# Patient Record
Sex: MALE | Race: WHITE | URBAN - METROPOLITAN AREA
[De-identification: names, ages, dates, MRNs, and addresses within clinical notes are randomized per-mention and may not be internally consistent; named-entity substitution may affect disease eponyms.]

---

## 2017-08-30 ENCOUNTER — INPATIENT (INPATIENT)
Facility: HOSPITAL | Age: 57
LOS: 5 days | Discharge: HOME | DRG: 853 | End: 2017-09-05
Attending: HOSPITALIST | Admitting: INTERNAL MEDICINE
Payer: COMMERCIAL

## 2017-08-30 VITALS
WEIGHT: 179.9 LBS | RESPIRATION RATE: 4 BRPM | SYSTOLIC BLOOD PRESSURE: 94 MMHG | DIASTOLIC BLOOD PRESSURE: 50 MMHG | HEIGHT: 67 IN | HEART RATE: 112 BPM

## 2017-08-30 DIAGNOSIS — D64.9 ANEMIA, UNSPECIFIED: ICD-10-CM

## 2017-08-30 DIAGNOSIS — R57.0 CARDIOGENIC SHOCK: ICD-10-CM

## 2017-08-30 DIAGNOSIS — R41.82 ALTERED MENTAL STATUS, UNSPECIFIED: ICD-10-CM

## 2017-08-30 DIAGNOSIS — J96.01 ACUTE RESPIRATORY FAILURE WITH HYPOXIA: ICD-10-CM

## 2017-08-30 DIAGNOSIS — E87.2 ACIDOSIS: ICD-10-CM

## 2017-08-30 DIAGNOSIS — A41.9 SEPSIS, UNSPECIFIED ORGANISM: ICD-10-CM

## 2017-08-30 DIAGNOSIS — E87.6 HYPOKALEMIA: ICD-10-CM

## 2017-08-30 DIAGNOSIS — E83.51 HYPOCALCEMIA: ICD-10-CM

## 2017-08-30 DIAGNOSIS — N17.9 ACUTE KIDNEY FAILURE, UNSPECIFIED: ICD-10-CM

## 2017-08-30 DIAGNOSIS — G93.40 ENCEPHALOPATHY, UNSPECIFIED: ICD-10-CM

## 2017-08-30 LAB
ALBUMIN SERPL ELPH-MCNC: 1.9 G/DL — LOW (ref 3.3–5.2)
ALBUMIN SERPL ELPH-MCNC: 3.1 G/DL — LOW (ref 3.3–5.2)
ALP SERPL-CCNC: 33 U/L — LOW (ref 40–120)
ALP SERPL-CCNC: 68 U/L — SIGNIFICANT CHANGE UP (ref 40–120)
ALT FLD-CCNC: 32 U/L — SIGNIFICANT CHANGE UP
ALT FLD-CCNC: 754 U/L — HIGH
AMPHET UR-MCNC: NEGATIVE — SIGNIFICANT CHANGE UP
ANION GAP SERPL CALC-SCNC: 16 MMOL/L — SIGNIFICANT CHANGE UP (ref 5–17)
ANION GAP SERPL CALC-SCNC: 18 MMOL/L — HIGH (ref 5–17)
ANION GAP SERPL CALC-SCNC: 19 MMOL/L — HIGH (ref 5–17)
APAP SERPL-MCNC: <7.5 UG/ML — LOW (ref 10–26)
APPEARANCE UR: CLEAR — SIGNIFICANT CHANGE UP
APTT BLD: 52.6 SEC — HIGH (ref 27.5–37.4)
AST SERPL-CCNC: 43 U/L — HIGH
AST SERPL-CCNC: 495 U/L — HIGH
BARBITURATES UR SCN-MCNC: NEGATIVE — SIGNIFICANT CHANGE UP
BASE EXCESS BLDA CALC-SCNC: -10.1 MMOL/L — LOW (ref -3–3)
BENZODIAZ UR-MCNC: NEGATIVE — SIGNIFICANT CHANGE UP
BILIRUB SERPL-MCNC: 0.1 MG/DL — LOW (ref 0.4–2)
BILIRUB SERPL-MCNC: 0.7 MG/DL — SIGNIFICANT CHANGE UP (ref 0.4–2)
BILIRUB UR-MCNC: NEGATIVE — SIGNIFICANT CHANGE UP
BLD GP AB SCN SERPL QL: SIGNIFICANT CHANGE UP
BLOOD GAS COMMENTS ARTERIAL: SIGNIFICANT CHANGE UP
BUN SERPL-MCNC: 16 MG/DL — SIGNIFICANT CHANGE UP (ref 8–20)
BUN SERPL-MCNC: 30 MG/DL — HIGH (ref 8–20)
BUN SERPL-MCNC: 33 MG/DL — HIGH (ref 8–20)
CALCIUM SERPL-MCNC: 3.8 MG/DL — CRITICAL LOW (ref 8.6–10.2)
CALCIUM SERPL-MCNC: 7.8 MG/DL — LOW (ref 8.6–10.2)
CALCIUM SERPL-MCNC: 8.2 MG/DL — LOW (ref 8.6–10.2)
CHLORIDE SERPL-SCNC: 105 MMOL/L — SIGNIFICANT CHANGE UP (ref 98–107)
CHLORIDE SERPL-SCNC: 106 MMOL/L — SIGNIFICANT CHANGE UP (ref 98–107)
CHLORIDE SERPL-SCNC: 113 MMOL/L — HIGH (ref 98–107)
CK MB CFR SERPL CALC: 101 NG/ML — HIGH (ref 0–6.7)
CK MB CFR SERPL CALC: 112.6 NG/ML — HIGH (ref 0–6.7)
CK MB CFR SERPL CALC: 46.3 NG/ML — HIGH (ref 0–6.7)
CK SERPL-CCNC: 2462 U/L — HIGH (ref 30–200)
CK SERPL-CCNC: 2569 U/L — HIGH (ref 30–200)
CK SERPL-CCNC: 765 U/L — HIGH (ref 30–200)
CO2 SERPL-SCNC: 13 MMOL/L — LOW (ref 22–29)
CO2 SERPL-SCNC: 18 MMOL/L — LOW (ref 22–29)
CO2 SERPL-SCNC: 20 MMOL/L — LOW (ref 22–29)
COCAINE METAB.OTHER UR-MCNC: NEGATIVE — SIGNIFICANT CHANGE UP
COLOR SPEC: YELLOW — SIGNIFICANT CHANGE UP
CREAT SERPL-MCNC: 0.9 MG/DL — SIGNIFICANT CHANGE UP (ref 0.5–1.3)
CREAT SERPL-MCNC: 1.97 MG/DL — HIGH (ref 0.5–1.3)
CREAT SERPL-MCNC: 2.02 MG/DL — HIGH (ref 0.5–1.3)
DIFF PNL FLD: ABNORMAL
EOSINOPHIL # BLD AUTO: 0 K/UL — SIGNIFICANT CHANGE UP (ref 0–0.5)
EOSINOPHIL NFR BLD AUTO: 0.1 % — SIGNIFICANT CHANGE UP (ref 0–5)
GAS PNL BLDA: SIGNIFICANT CHANGE UP
GLUCOSE SERPL-MCNC: 100 MG/DL — SIGNIFICANT CHANGE UP (ref 70–115)
GLUCOSE SERPL-MCNC: 129 MG/DL — HIGH (ref 70–115)
GLUCOSE SERPL-MCNC: 155 MG/DL — HIGH (ref 70–115)
GLUCOSE UR QL: NEGATIVE MG/DL — SIGNIFICANT CHANGE UP
GRAM STN FLD: SIGNIFICANT CHANGE UP
HCO3 BLDA-SCNC: 17 MMOL/L — LOW (ref 20–26)
HCT VFR BLD CALC: 24.5 % — LOW (ref 42–52)
HCT VFR BLD CALC: 45.5 % — SIGNIFICANT CHANGE UP (ref 42–52)
HGB BLD-MCNC: 15.1 G/DL — SIGNIFICANT CHANGE UP (ref 14–18)
HGB BLD-MCNC: 7.8 G/DL — LOW (ref 14–18)
HOROWITZ INDEX BLDA+IHG-RTO: 1 — SIGNIFICANT CHANGE UP
INR BLD: 1.18 RATIO — HIGH (ref 0.88–1.16)
INR BLD: 2.12 RATIO — HIGH (ref 0.88–1.16)
KETONES UR-MCNC: NEGATIVE — SIGNIFICANT CHANGE UP
LACTATE BLDV-MCNC: 3.9 MMOL/L — HIGH (ref 0.5–2)
LEUKOCYTE ESTERASE UR-ACNC: NEGATIVE — SIGNIFICANT CHANGE UP
LYMPHOCYTES # BLD AUTO: 1 K/UL — SIGNIFICANT CHANGE UP (ref 1–4.8)
LYMPHOCYTES # BLD AUTO: 10.5 % — LOW (ref 20–55)
MAGNESIUM SERPL-MCNC: 1.8 MG/DL — SIGNIFICANT CHANGE UP (ref 1.6–2.6)
MAGNESIUM SERPL-MCNC: 1.8 MG/DL — SIGNIFICANT CHANGE UP (ref 1.6–2.6)
MCHC RBC-ENTMCNC: 29.9 PG — SIGNIFICANT CHANGE UP (ref 27–31)
MCHC RBC-ENTMCNC: 30.1 PG — SIGNIFICANT CHANGE UP (ref 27–31)
MCHC RBC-ENTMCNC: 31.8 G/DL — LOW (ref 32–36)
MCHC RBC-ENTMCNC: 33.2 G/DL — SIGNIFICANT CHANGE UP (ref 32–36)
MCV RBC AUTO: 90.8 FL — SIGNIFICANT CHANGE UP (ref 80–94)
MCV RBC AUTO: 93.9 FL — SIGNIFICANT CHANGE UP (ref 80–94)
METHADONE UR-MCNC: NEGATIVE — SIGNIFICANT CHANGE UP
MONOCYTES # BLD AUTO: 0.8 K/UL — SIGNIFICANT CHANGE UP (ref 0–0.8)
MONOCYTES NFR BLD AUTO: 8.6 % — SIGNIFICANT CHANGE UP (ref 3–10)
NEUTROPHILS # BLD AUTO: 7.6 K/UL — SIGNIFICANT CHANGE UP (ref 1.8–8)
NEUTROPHILS NFR BLD AUTO: 79.9 % — HIGH (ref 37–73)
NITRITE UR-MCNC: NEGATIVE — SIGNIFICANT CHANGE UP
OPIATES UR-MCNC: NEGATIVE — SIGNIFICANT CHANGE UP
OSMOLALITY SERPL: 319 MOS/KG — HIGH (ref 275–300)
OSMOLALITY UR: 460 MOSM/KG — SIGNIFICANT CHANGE UP (ref 300–1000)
PCO2 BLDA: 35 MMHG — SIGNIFICANT CHANGE UP (ref 35–45)
PCP SPEC-MCNC: SIGNIFICANT CHANGE UP
PCP UR-MCNC: NEGATIVE — SIGNIFICANT CHANGE UP
PH BLDA: 7.26 — LOW (ref 7.35–7.45)
PH UR: 5 — SIGNIFICANT CHANGE UP (ref 5–8)
PHOSPHATE SERPL-MCNC: 1.5 MG/DL — LOW (ref 2.4–4.7)
PHOSPHATE SERPL-MCNC: 3.7 MG/DL — SIGNIFICANT CHANGE UP (ref 2.4–4.7)
PLATELET # BLD AUTO: 177 K/UL — SIGNIFICANT CHANGE UP (ref 150–400)
PLATELET # BLD AUTO: 333 K/UL — SIGNIFICANT CHANGE UP (ref 150–400)
PO2 BLDA: 202 MMHG — HIGH (ref 83–108)
POTASSIUM SERPL-MCNC: 3.2 MMOL/L — LOW (ref 3.5–5.3)
POTASSIUM SERPL-MCNC: 4.6 MMOL/L — SIGNIFICANT CHANGE UP (ref 3.5–5.3)
POTASSIUM SERPL-MCNC: 6.1 MMOL/L — CRITICAL HIGH (ref 3.5–5.3)
POTASSIUM SERPL-SCNC: 3.2 MMOL/L — LOW (ref 3.5–5.3)
POTASSIUM SERPL-SCNC: 4.6 MMOL/L — SIGNIFICANT CHANGE UP (ref 3.5–5.3)
POTASSIUM SERPL-SCNC: 6.1 MMOL/L — CRITICAL HIGH (ref 3.5–5.3)
PROCALCITONIN SERPL-MCNC: 19.1 NG/ML — HIGH (ref 0–0.04)
PROT SERPL-MCNC: 3.2 G/DL — LOW (ref 6.6–8.7)
PROT SERPL-MCNC: 5.6 G/DL — LOW (ref 6.6–8.7)
PROT UR-MCNC: NEGATIVE MG/DL — SIGNIFICANT CHANGE UP
PROTHROM AB SERPL-ACNC: 13 SEC — HIGH (ref 9.8–12.7)
PROTHROM AB SERPL-ACNC: 23.7 SEC — HIGH (ref 9.8–12.7)
RBC # BLD: 2.61 M/UL — LOW (ref 4.6–6.2)
RBC # BLD: 5.01 M/UL — SIGNIFICANT CHANGE UP (ref 4.6–6.2)
RBC # FLD: 14.5 % — SIGNIFICANT CHANGE UP (ref 11–15.6)
RBC # FLD: 14.5 % — SIGNIFICANT CHANGE UP (ref 11–15.6)
RBC CASTS # UR COMP ASSIST: ABNORMAL /HPF (ref 0–4)
SALICYLATES SERPL-MCNC: <2 MG/DL — LOW (ref 10–20)
SAO2 % BLDA: 100 % — HIGH (ref 95–99)
SODIUM SERPL-SCNC: 142 MMOL/L — SIGNIFICANT CHANGE UP (ref 135–145)
SODIUM SERPL-SCNC: 143 MMOL/L — SIGNIFICANT CHANGE UP (ref 135–145)
SODIUM SERPL-SCNC: 143 MMOL/L — SIGNIFICANT CHANGE UP (ref 135–145)
SP GR SPEC: 1.02 — SIGNIFICANT CHANGE UP (ref 1.01–1.02)
SPECIMEN SOURCE: SIGNIFICANT CHANGE UP
THC UR QL: NEGATIVE — SIGNIFICANT CHANGE UP
TROPONIN T SERPL-MCNC: 0.04 NG/ML — SIGNIFICANT CHANGE UP (ref 0–0.06)
TROPONIN T SERPL-MCNC: 0.44 NG/ML — HIGH (ref 0–0.06)
TSH SERPL-MCNC: 2.29 UIU/ML — SIGNIFICANT CHANGE UP (ref 0.27–4.2)
TYPE + AB SCN PNL BLD: SIGNIFICANT CHANGE UP
UROBILINOGEN FLD QL: NEGATIVE MG/DL — SIGNIFICANT CHANGE UP
WBC # BLD: 15 K/UL — HIGH (ref 4.8–10.8)
WBC # BLD: 9.5 K/UL — SIGNIFICANT CHANGE UP (ref 4.8–10.8)
WBC # FLD AUTO: 15 K/UL — HIGH (ref 4.8–10.8)
WBC # FLD AUTO: 9.5 K/UL — SIGNIFICANT CHANGE UP (ref 4.8–10.8)
WBC UR QL: SIGNIFICANT CHANGE UP

## 2017-08-30 PROCEDURE — 31500 INSERT EMERGENCY AIRWAY: CPT

## 2017-08-30 PROCEDURE — 93970 EXTREMITY STUDY: CPT | Mod: 26

## 2017-08-30 PROCEDURE — 99291 CRITICAL CARE FIRST HOUR: CPT

## 2017-08-30 PROCEDURE — 99291 CRITICAL CARE FIRST HOUR: CPT | Mod: 25

## 2017-08-30 PROCEDURE — 70450 CT HEAD/BRAIN W/O DYE: CPT | Mod: 26

## 2017-08-30 PROCEDURE — 74174 CTA ABD&PLVS W/CONTRAST: CPT | Mod: 26

## 2017-08-30 PROCEDURE — 93010 ELECTROCARDIOGRAM REPORT: CPT

## 2017-08-30 PROCEDURE — 71275 CT ANGIOGRAPHY CHEST: CPT | Mod: 26

## 2017-08-30 PROCEDURE — 71010: CPT | Mod: 26

## 2017-08-30 PROCEDURE — 71010: CPT | Mod: 26,77,76

## 2017-08-30 RX ORDER — PROPOFOL 10 MG/ML
10 INJECTION, EMULSION INTRAVENOUS ONCE
Qty: 0 | Refills: 0 | Status: COMPLETED | OUTPATIENT
Start: 2017-08-30 | End: 2017-08-30

## 2017-08-30 RX ORDER — CALCIUM GLUCONATE 100 MG/ML
2 VIAL (ML) INTRAVENOUS ONCE
Qty: 2 | Refills: 0 | Status: COMPLETED | OUTPATIENT
Start: 2017-08-30 | End: 2017-08-30

## 2017-08-30 RX ORDER — PIPERACILLIN AND TAZOBACTAM 4; .5 G/20ML; G/20ML
3.38 INJECTION, POWDER, LYOPHILIZED, FOR SOLUTION INTRAVENOUS ONCE
Qty: 0 | Refills: 0 | Status: DISCONTINUED | OUTPATIENT
Start: 2017-08-30 | End: 2017-08-30

## 2017-08-30 RX ORDER — NOREPINEPHRINE BITARTRATE/D5W 8 MG/250ML
0.05 PLASTIC BAG, INJECTION (ML) INTRAVENOUS
Qty: 8 | Refills: 0 | Status: DISCONTINUED | OUTPATIENT
Start: 2017-08-30 | End: 2017-08-31

## 2017-08-30 RX ORDER — ENOXAPARIN SODIUM 100 MG/ML
40 INJECTION SUBCUTANEOUS DAILY
Qty: 0 | Refills: 0 | Status: DISCONTINUED | OUTPATIENT
Start: 2017-08-30 | End: 2017-09-05

## 2017-08-30 RX ORDER — HYDROCORTISONE 20 MG
50 TABLET ORAL EVERY 6 HOURS
Qty: 0 | Refills: 0 | Status: DISCONTINUED | OUTPATIENT
Start: 2017-08-30 | End: 2017-09-02

## 2017-08-30 RX ORDER — SODIUM BICARBONATE 1 MEQ/ML
50 SYRINGE (ML) INTRAVENOUS ONCE
Qty: 0 | Refills: 0 | Status: COMPLETED | OUTPATIENT
Start: 2017-08-30 | End: 2017-08-30

## 2017-08-30 RX ORDER — VASOPRESSIN 20 [USP'U]/ML
0.04 INJECTION INTRAVENOUS
Qty: 100 | Refills: 0 | Status: DISCONTINUED | OUTPATIENT
Start: 2017-08-30 | End: 2017-08-31

## 2017-08-30 RX ORDER — ACETAMINOPHEN 500 MG
650 TABLET ORAL EVERY 6 HOURS
Qty: 0 | Refills: 0 | Status: DISCONTINUED | OUTPATIENT
Start: 2017-08-30 | End: 2017-09-03

## 2017-08-30 RX ORDER — POTASSIUM CHLORIDE 20 MEQ
10 PACKET (EA) ORAL ONCE
Qty: 0 | Refills: 0 | Status: COMPLETED | OUTPATIENT
Start: 2017-08-30 | End: 2017-08-30

## 2017-08-30 RX ORDER — SODIUM POLYSTYRENE SULFONATE 4.1 MEQ/G
30 POWDER, FOR SUSPENSION ORAL ONCE
Qty: 0 | Refills: 0 | Status: COMPLETED | OUTPATIENT
Start: 2017-08-30 | End: 2017-08-30

## 2017-08-30 RX ORDER — SODIUM CHLORIDE 9 MG/ML
2000 INJECTION INTRAMUSCULAR; INTRAVENOUS; SUBCUTANEOUS ONCE
Qty: 0 | Refills: 0 | Status: COMPLETED | OUTPATIENT
Start: 2017-08-30 | End: 2017-08-30

## 2017-08-30 RX ORDER — SODIUM POLYSTYRENE SULFONATE 4.1 MEQ/G
15 POWDER, FOR SUSPENSION ORAL ONCE
Qty: 0 | Refills: 0 | Status: DISCONTINUED | OUTPATIENT
Start: 2017-08-30 | End: 2017-08-30

## 2017-08-30 RX ORDER — MAGNESIUM SULFATE 500 MG/ML
2 VIAL (ML) INJECTION ONCE
Qty: 0 | Refills: 0 | Status: COMPLETED | OUTPATIENT
Start: 2017-08-30 | End: 2017-08-30

## 2017-08-30 RX ORDER — SODIUM CHLORIDE 9 MG/ML
1000 INJECTION, SOLUTION INTRAVENOUS
Qty: 0 | Refills: 0 | Status: DISCONTINUED | OUTPATIENT
Start: 2017-08-30 | End: 2017-08-31

## 2017-08-30 RX ORDER — PROPOFOL 10 MG/ML
60 INJECTION, EMULSION INTRAVENOUS ONCE
Qty: 0 | Refills: 0 | Status: COMPLETED | OUTPATIENT
Start: 2017-08-30 | End: 2017-08-30

## 2017-08-30 RX ORDER — PIPERACILLIN AND TAZOBACTAM 4; .5 G/20ML; G/20ML
3.38 INJECTION, POWDER, LYOPHILIZED, FOR SOLUTION INTRAVENOUS EVERY 8 HOURS
Qty: 0 | Refills: 0 | Status: DISCONTINUED | OUTPATIENT
Start: 2017-08-30 | End: 2017-09-01

## 2017-08-30 RX ORDER — DEXMEDETOMIDINE HYDROCHLORIDE IN 0.9% SODIUM CHLORIDE 4 UG/ML
0.7 INJECTION INTRAVENOUS
Qty: 200 | Refills: 0 | Status: DISCONTINUED | OUTPATIENT
Start: 2017-08-30 | End: 2017-08-31

## 2017-08-30 RX ORDER — VANCOMYCIN HCL 1 G
750 VIAL (EA) INTRAVENOUS EVERY 12 HOURS
Qty: 0 | Refills: 0 | Status: DISCONTINUED | OUTPATIENT
Start: 2017-08-30 | End: 2017-08-31

## 2017-08-30 RX ORDER — INSULIN LISPRO 100/ML
VIAL (ML) SUBCUTANEOUS EVERY 4 HOURS
Qty: 0 | Refills: 0 | Status: DISCONTINUED | OUTPATIENT
Start: 2017-08-30 | End: 2017-08-31

## 2017-08-30 RX ORDER — DOBUTAMINE HCL 250MG/20ML
5 VIAL (ML) INTRAVENOUS
Qty: 500 | Refills: 0 | Status: DISCONTINUED | OUTPATIENT
Start: 2017-08-30 | End: 2017-08-31

## 2017-08-30 RX ORDER — DEXTROSE 50 % IN WATER 50 %
25 SYRINGE (ML) INTRAVENOUS ONCE
Qty: 0 | Refills: 0 | Status: DISCONTINUED | OUTPATIENT
Start: 2017-08-30 | End: 2017-08-31

## 2017-08-30 RX ORDER — THIAMINE MONONITRATE (VIT B1) 100 MG
200 TABLET ORAL EVERY 12 HOURS
Qty: 0 | Refills: 0 | Status: DISCONTINUED | OUTPATIENT
Start: 2017-08-30 | End: 2017-09-05

## 2017-08-30 RX ORDER — CHLORHEXIDINE GLUCONATE 213 G/1000ML
15 SOLUTION TOPICAL
Qty: 0 | Refills: 0 | Status: DISCONTINUED | OUTPATIENT
Start: 2017-08-30 | End: 2017-08-31

## 2017-08-30 RX ORDER — SODIUM BICARBONATE 1 MEQ/ML
0.09 SYRINGE (ML) INTRAVENOUS
Qty: 75 | Refills: 0 | Status: DISCONTINUED | OUTPATIENT
Start: 2017-08-30 | End: 2017-08-30

## 2017-08-30 RX ORDER — AZITHROMYCIN 500 MG/1
500 TABLET, FILM COATED ORAL EVERY 24 HOURS
Qty: 0 | Refills: 0 | Status: DISCONTINUED | OUTPATIENT
Start: 2017-08-31 | End: 2017-08-31

## 2017-08-30 RX ORDER — AZITHROMYCIN 500 MG/1
TABLET, FILM COATED ORAL
Qty: 0 | Refills: 0 | Status: DISCONTINUED | OUTPATIENT
Start: 2017-08-30 | End: 2017-08-31

## 2017-08-30 RX ORDER — VANCOMYCIN HCL 1 G
1000 VIAL (EA) INTRAVENOUS ONCE
Qty: 0 | Refills: 0 | Status: DISCONTINUED | OUTPATIENT
Start: 2017-08-30 | End: 2017-08-30

## 2017-08-30 RX ORDER — AZITHROMYCIN 500 MG/1
500 TABLET, FILM COATED ORAL ONCE
Qty: 0 | Refills: 0 | Status: COMPLETED | OUTPATIENT
Start: 2017-08-30 | End: 2017-08-30

## 2017-08-30 RX ORDER — SODIUM CHLORIDE 9 MG/ML
1000 INJECTION, SOLUTION INTRAVENOUS
Qty: 0 | Refills: 0 | Status: DISCONTINUED | OUTPATIENT
Start: 2017-08-30 | End: 2017-08-30

## 2017-08-30 RX ORDER — VANCOMYCIN HCL 1 G
VIAL (EA) INTRAVENOUS
Qty: 0 | Refills: 0 | Status: DISCONTINUED | OUTPATIENT
Start: 2017-08-30 | End: 2017-08-30

## 2017-08-30 RX ORDER — THIAMINE MONONITRATE (VIT B1) 100 MG
200 TABLET ORAL EVERY 12 HOURS
Qty: 0 | Refills: 0 | Status: DISCONTINUED | OUTPATIENT
Start: 2017-08-30 | End: 2017-08-30

## 2017-08-30 RX ORDER — DEXTROSE 50 % IN WATER 50 %
1 SYRINGE (ML) INTRAVENOUS ONCE
Qty: 0 | Refills: 0 | Status: DISCONTINUED | OUTPATIENT
Start: 2017-08-30 | End: 2017-08-31

## 2017-08-30 RX ORDER — GLUCAGON INJECTION, SOLUTION 0.5 MG/.1ML
1 INJECTION, SOLUTION SUBCUTANEOUS ONCE
Qty: 0 | Refills: 0 | Status: DISCONTINUED | OUTPATIENT
Start: 2017-08-30 | End: 2017-08-31

## 2017-08-30 RX ORDER — PIPERACILLIN AND TAZOBACTAM 4; .5 G/20ML; G/20ML
3.38 INJECTION, POWDER, LYOPHILIZED, FOR SOLUTION INTRAVENOUS ONCE
Qty: 0 | Refills: 0 | Status: COMPLETED | OUTPATIENT
Start: 2017-08-30 | End: 2017-08-30

## 2017-08-30 RX ORDER — PANTOPRAZOLE SODIUM 20 MG/1
40 TABLET, DELAYED RELEASE ORAL DAILY
Qty: 0 | Refills: 0 | Status: DISCONTINUED | OUTPATIENT
Start: 2017-08-30 | End: 2017-09-01

## 2017-08-30 RX ORDER — DEXTROSE 50 % IN WATER 50 %
12.5 SYRINGE (ML) INTRAVENOUS ONCE
Qty: 0 | Refills: 0 | Status: DISCONTINUED | OUTPATIENT
Start: 2017-08-30 | End: 2017-08-31

## 2017-08-30 RX ADMIN — PROPOFOL 10 MILLIGRAM(S): 10 INJECTION, EMULSION INTRAVENOUS at 08:15

## 2017-08-30 RX ADMIN — Medication 50 MILLIGRAM(S): at 11:44

## 2017-08-30 RX ADMIN — PANTOPRAZOLE SODIUM 40 MILLIGRAM(S): 20 TABLET, DELAYED RELEASE ORAL at 12:09

## 2017-08-30 RX ADMIN — SODIUM CHLORIDE 100 MILLILITER(S): 9 INJECTION, SOLUTION INTRAVENOUS at 13:39

## 2017-08-30 RX ADMIN — Medication 50 MILLIGRAM(S): at 17:46

## 2017-08-30 RX ADMIN — DEXMEDETOMIDINE HYDROCHLORIDE IN 0.9% SODIUM CHLORIDE 14.28 MICROGRAM(S)/KG/HR: 4 INJECTION INTRAVENOUS at 10:44

## 2017-08-30 RX ADMIN — Medication 102 MILLIGRAM(S): at 17:45

## 2017-08-30 RX ADMIN — SODIUM CHLORIDE 2000 MILLILITER(S): 9 INJECTION INTRAMUSCULAR; INTRAVENOUS; SUBCUTANEOUS at 07:55

## 2017-08-30 RX ADMIN — SODIUM CHLORIDE 50 MILLILITER(S): 9 INJECTION, SOLUTION INTRAVENOUS at 23:17

## 2017-08-30 RX ADMIN — Medication 7.65 MICROGRAM(S)/KG/MIN: at 10:44

## 2017-08-30 RX ADMIN — Medication 200 GRAM(S): at 10:43

## 2017-08-30 RX ADMIN — Medication 50 MILLIEQUIVALENT(S): at 09:11

## 2017-08-30 RX ADMIN — AZITHROMYCIN 255 MILLIGRAM(S): 500 TABLET, FILM COATED ORAL at 15:40

## 2017-08-30 RX ADMIN — Medication 63.75 MILLIMOLE(S): at 23:18

## 2017-08-30 RX ADMIN — Medication 200 GRAM(S): at 13:39

## 2017-08-30 RX ADMIN — Medication 150 MILLIGRAM(S): at 16:56

## 2017-08-30 RX ADMIN — Medication 650 MILLIGRAM(S): at 17:44

## 2017-08-30 RX ADMIN — DEXMEDETOMIDINE HYDROCHLORIDE IN 0.9% SODIUM CHLORIDE 14.28 MICROGRAM(S)/KG/HR: 4 INJECTION INTRAVENOUS at 13:39

## 2017-08-30 RX ADMIN — Medication 50 MILLIGRAM(S): at 23:16

## 2017-08-30 RX ADMIN — SODIUM POLYSTYRENE SULFONATE 30 GRAM(S): 4.1 POWDER, FOR SUSPENSION ORAL at 16:37

## 2017-08-30 RX ADMIN — VASOPRESSIN 2.4 UNIT(S)/MIN: 20 INJECTION INTRAVENOUS at 13:00

## 2017-08-30 RX ADMIN — PIPERACILLIN AND TAZOBACTAM 200 GRAM(S): 4; .5 INJECTION, POWDER, LYOPHILIZED, FOR SOLUTION INTRAVENOUS at 11:17

## 2017-08-30 RX ADMIN — Medication 50 MILLIEQUIVALENT(S): at 12:36

## 2017-08-30 RX ADMIN — PROPOFOL 60 MILLIGRAM(S): 10 INJECTION, EMULSION INTRAVENOUS at 10:32

## 2017-08-30 RX ADMIN — Medication 50 GRAM(S): at 23:17

## 2017-08-30 RX ADMIN — CHLORHEXIDINE GLUCONATE 15 MILLILITER(S): 213 SOLUTION TOPICAL at 17:46

## 2017-08-30 RX ADMIN — DEXMEDETOMIDINE HYDROCHLORIDE IN 0.9% SODIUM CHLORIDE 14.28 MICROGRAM(S)/KG/HR: 4 INJECTION INTRAVENOUS at 16:38

## 2017-08-30 RX ADMIN — DEXMEDETOMIDINE HYDROCHLORIDE IN 0.9% SODIUM CHLORIDE 14.28 MICROGRAM(S)/KG/HR: 4 INJECTION INTRAVENOUS at 20:13

## 2017-08-30 RX ADMIN — PIPERACILLIN AND TAZOBACTAM 25 GRAM(S): 4; .5 INJECTION, POWDER, LYOPHILIZED, FOR SOLUTION INTRAVENOUS at 17:45

## 2017-08-30 RX ADMIN — Medication 7.65 MICROGRAM(S)/KG/MIN: at 16:38

## 2017-08-30 RX ADMIN — Medication 100 MILLIEQUIVALENT(S): at 11:12

## 2017-08-30 RX ADMIN — Medication 7.65 MICROGRAM(S)/KG/MIN: at 20:20

## 2017-08-30 RX ADMIN — Medication 12.24 MICROGRAM(S)/KG/MIN: at 16:55

## 2017-08-30 RX ADMIN — Medication 100 MEQ/KG/HR: at 16:54

## 2017-08-30 RX ADMIN — Medication 7.65 MICROGRAM(S)/KG/MIN: at 13:39

## 2017-08-30 RX ADMIN — Medication 100 MILLIEQUIVALENT(S): at 12:09

## 2017-08-30 RX ADMIN — PROPOFOL 10 MILLIGRAM(S): 10 INJECTION, EMULSION INTRAVENOUS at 08:00

## 2017-08-30 NOTE — PROCEDURE NOTE - NSPROCDETAILS_GEN_ALL_CORE
guidewire recovered/lumen(s) aspirated and flushed/sterile dressing applied/sterile technique, catheter placed/ultrasound guidance
positive blood return obtained via catheter/sutured in place/all materials/supplies accounted for at end of procedure/connected to a pressurized flush line/Seldinger technique/ultrasound guidance/location identified, draped/prepped, sterile technique used, needle inserted/introduced

## 2017-08-30 NOTE — CONSULT NOTE ADULT - SUBJECTIVE AND OBJECTIVE BOX
Patient is a 57y old  Male who presents with unresponsiveness      HPI:  58 yo male, PMHx HTN, HLD, chronic back pain, BIBA after being found in bed, unresponsive with agonal respirations by his wife this morning. Per pt's wife, pt usually takes 1 Percocet to sleep at night but ran out of his medication 3 days ago, and has not slept in 3 days. Went to PMD yesterday and was given Suboxone 8mg-2mg SL film. Pt was found in the AM by wife to be unresponsive and pt sent to [facility] ER.  Pt now resides in MICU with respiratory failure and probable septic shock due to aspiration.  We are called regarding ARF and hyperkalemia.  Since admission pt requiring less pressor support.      PAST MEDICAL & SURGICAL HISTORY:  Chronic back pain  High cholesterol  Essential hypertension  No significant past surgical history      FAMILY HISTORY:  No pertinent family history in first degree relatives      Social History:  Non smoker; no EtOH abuse    MEDICATIONS  (STANDING):  chlorhexidine 0.12% Liquid 15 milliLiter(s) Swish and Spit two times a day  pantoprazole  Injectable 40 milliGRAM(s) IV Push daily  norepinephrine Infusion 0.05 MICROgram(s)/kG/Min (7.65 mL/Hr) IV Continuous <Continuous>  enoxaparin Injectable 40 milliGRAM(s) SubCutaneous daily  dexmedetomidine Infusion 0.7 MICROgram(s)/kG/Hr (14.28 mL/Hr) IV Continuous <Continuous>  piperacillin/tazobactam IVPB. 3.375 Gram(s) IV Intermittent every 8 hours  hydrocortisone sodium succinate Injectable 50 milliGRAM(s) IV Push every 6 hours  Ascorbic Acid in 160mL NS 1500 milliGRAM(s) 160 milliLiter(s) IV Intermittent every 6 hours  thiamine IVPB 200 milliGRAM(s) IV Intermittent every 12 hours  vasopressin Infusion 0.04 Unit(s)/Min (2.4 mL/Hr) IV Continuous <Continuous>  azithromycin  IVPB   IV Intermittent   sodium bicarbonate  Infusion 0.092 mEq/kG/Hr (100 mL/Hr) IV Continuous <Continuous>  DOBUTamine Infusion 5 MICROgram(s)/kG/Min (12.24 mL/Hr) IV Continuous <Continuous>  vancomycin  IVPB 750 milliGRAM(s) IV Intermittent every 12 hours    MEDICATIONS  (PRN):  acetaminophen    Suspension 650 milliGRAM(s) Oral every 6 hours PRN For Temp greater than 38.5 C (101.3 F)      Allergies    No Known Allergies    Intolerances        REVIEW OF SYSTEMS:  unable to assess      Vital Signs Last 24 Hrs  T(C): 39.2 (30 Aug 2017 19:30), Max: 39.4 (30 Aug 2017 18:45)  T(F): 102.6 (30 Aug 2017 19:30), Max: 102.9 (30 Aug 2017 18:45)  HR: 116 (30 Aug 2017 19:30) (78 - 118)  BP: 84/64 (30 Aug 2017 14:30) (69/45 - 135/82)  BP(mean): 70 (30 Aug 2017 14:30) (51 - 101)  RR: 27 (30 Aug 2017 19:30) (4 - 46)  SpO2: 99% (30 Aug 2017 19:30) (89% - 100%)    PHYSICAL EXAM:    GENERAL: Vented  HEAD:  ET tube in place  NECK: Supple, No JVD  NERVOUS SYSTEM:  Obtunded  CHEST/LUNG: Clear to percussion bilaterally; No rales, rhonchi, wheezing, or rubs  HEART: Regular rate and rhythm; No rub  ABDOMEN: Soft, Nontender, Nondistended; Bowel sounds present  EXTREMITIES:  2+ Peripheral Pulses, No clubbing, cyanosis, or edema  SKIN: No rashes or lesions      LABS:                        15.1   15.0  )-----------( 333      ( 30 Aug 2017 14:40 )             45.5     08-30    143  |  106  |  30.0<H>  ----------------------------<  129<H>  6.1<HH>   |  18.0<L>  |  2.02<H>    Ca    8.2<L>      30 Aug 2017 14:40  Phos  3.7     08-30  Mg     1.8     08-30    TPro  3.2<L>  /  Alb  1.9<L>  /  TBili  0.1<L>  /  DBili  x   /  AST  43<H>  /  ALT  32  /  AlkPhos  33<L>  08-30    PT/INR - ( 30 Aug 2017 14:40 )   PT: 13.0 sec;   INR: 1.18 ratio    Blood Gas Arterial, Lactate: 5.9: TYPE:(C=Critical, N=Notification, A=Abnormal) c  TESTS: _lactate, k  DATE/TIME CALLED: _17 12:12  CALLED TO: _dr. raymond  READ BACK (2 Patient Identifiers)(Y/N): _y  READ BACK VALUES (Y/N): _y  CALLED BY: Rohit  As of 17 the reference range fo5.9: r Lactic Acid, whole blood has changed. mmoL/L (17 @ 12:10)         PTT - ( 30 Aug 2017 07:46 )  PTT:52.6 sec  Urinalysis Basic - ( 30 Aug 2017 07:54 )    Color: Yellow / Appearance: Clear / S.025 / pH: x  Gluc: x / Ketone: Negative  / Bili: Negative / Urobili: Negative mg/dL   Blood: x / Protein: Negative mg/dL / Nitrite: Negative   Leuk Esterase: Negative / RBC: 6-10 /HPF / WBC 0-2   Sq Epi: x / Non Sq Epi: x / Bacteria: x      Magnesium, Serum: 1.8 mg/dL ( @ 14:40)  Phosphorus Level, Serum: 3.7 mg/dL ( @ 14:40)      RADIOLOGY & ADDITIONAL TESTS:  < from: CT Angio Chest w/ IV Cont (17 @ 08:38) >      < end of copied text >

## 2017-08-30 NOTE — ED ADULT NURSE NOTE - OBJECTIVE STATEMENT
57 yom presents to ed unresponsive. family states pt took po suboxone last night before bed has hx of po narcotic abuse in past, pt went to MD yesterday and was prescribe medication for back pain. found unresponsive this AM. shallow resps. abd distended. ems BVM at this time. iv access established, narcan administerd . emesis noted at time of intubation. ng tube placed to prevent aspiration,

## 2017-08-30 NOTE — H&P ADULT - MENTAL STATUS
Sedated on ventilator with RASS -3. Withdraws to noxious stimuli, intermittent periods of spontaneous eye opening and movement.

## 2017-08-30 NOTE — PROCEDURE NOTE - NSINFORMCONSENT_GEN_A_CORE
Benefits, risks, and possible complications of procedure explained to patient/caregiver who verbalized understanding and gave written consent./written consent obtained from patient's spouse

## 2017-08-30 NOTE — ED PROVIDER NOTE - CARE PLAN
Principal Discharge DX:	Altered mental status  Secondary Diagnosis:	Sepsis, due to unspecified organism  Secondary Diagnosis:	Hypokalemia

## 2017-08-30 NOTE — ED ADULT TRIAGE NOTE - CHIEF COMPLAINT QUOTE
BIBA, unresponsive, slow shallow snorous respirations, initial spo2 86% on RA by EMS, breathing assisted via BVM. patient non responsive to Narcan 4mg IVP in staggered dose, recently started on suboxone. code team at bedside, RSI prepared.

## 2017-08-30 NOTE — H&P ADULT - HISTORY OF PRESENT ILLNESS
58 yo male, PMHx HTN, HLD, chronic back pain, BIBA after being found in bed, unresponsive with agonal respirations by his wife this morning. Per pt's wife, pt usually takes 1 Percocet to sleep at night but ran out of his medication 3 days ago, and has not slept in 3 days. Went to PMD Dr. Singh yesterday for "sleeping pill" and was given Suboxone 8mg-2mg SL film. Pt took 1 film and went to sleep at ~1900, was found by wife next AM at 0600 and she was unable to arouse him. Pt was found with agonal respirations and incontinent of urine. Pt was emergently intubated in ED for hypoxemic respiratory failure. Was treated with 4mg IVP Narcan without improvement in mental status. CT head performed- negative for acute CVA or hemorrhage. CTA chest / abd / pelvis was performed for low Hgb, negative for acute hemorrhage. Found to be hypotensive and hypothermic, initial lactate 3.9, treated with 4L NS IV fluid and started on Zosyn. Also with metabolic acidosis, treated sodium bicarbonate, and electrolyte derangements replaced. Per family, patient was in usual state of health yesterday without any complaints. Denied complaints of chest pain, shortness of breath, abdominal pain, fever, cough, vomiting, or diarrhea.

## 2017-08-30 NOTE — ED PROVIDER NOTE - OBJECTIVE STATEMENT
PT ARRIVED UNRESPONSIVE VIA EMS, HYPOTENSIVE, HYPOTHERMIC, RR 4-6. HX PRESCRIPTION DRUG USE FOR BACK PAIN.GIVEN SUBOXONE LAST NIGHT BY PMD, DR COOK, 8MG-2MG. WIFE SAYS HE URINATED IN BED AT SOME TIME DURING THE NIGHT.  MED HX ALSO SIG HTN, CHOLESTEROL, NO BLOOD THINNERS. NO ALCOHOL.

## 2017-08-30 NOTE — PATIENT PROFILE ADULT. - REASON FOR ADMISSION
wife states" he went see Dr Singh yesterday for chronic back pain, had not taken his meds, wanted to try something different, was given suboxone. took med last nite, could not wake him up this morning"

## 2017-08-30 NOTE — PROGRESS NOTE ADULT - PROBLEM SELECTOR PLAN 6
poor UOP, urine osm normal, lactic acid remains high with low pH- started on Bicarb infusion   K elevated at >6 will treat w kayexalate, Ca++ already given

## 2017-08-30 NOTE — PROGRESS NOTE ADULT - PROBLEM SELECTOR PLAN 2
increasing pressor req added Vasopressin in addition to Levophed, Marik sepsis protocol  bedside critical care echo assessment shows poor EF, official echo done and reported as above

## 2017-08-30 NOTE — PROGRESS NOTE ADULT - PROBLEM SELECTOR PLAN 4
multifactorial will re- eval neuros off sedation periodically  may entertain EEG in the next 24h, prolactin level sent out  all other toxic ingestions ruled out thus far

## 2017-08-30 NOTE — ED PROVIDER NOTE - CRITICAL CARE PROVIDED
consultation with other physicians/interpretation of diagnostic studies/direct patient care (not related to procedure)/60 MINUTES/additional history taking/documentation/consult w/ pt's family directly relating to pts condition

## 2017-08-30 NOTE — PROGRESS NOTE ADULT - PROBLEM SELECTOR PLAN 1
Possible aspiration, increasing oxygen req since admitted this morning, PEEP adjusted now showing improved PaO2.   Gram stain shows gram positives- Pt on Zosyn, Vanco, Azithro   f/u ID and sens of culture

## 2017-08-30 NOTE — H&P ADULT - NSHPSOCIALHISTORY_GEN_ALL_CORE
Non-smoker; no h/o EtOH abuse; opiate use daily to help with insomnia, admitted to family to wanting to cut back

## 2017-08-30 NOTE — H&P ADULT - ASSESSMENT
58 yo male, PMHx HTN, HLD, chronic back pain, BIBA after being found in bed, unresponsive with agonal respirations by his wife this morning. Hypoxemic respiratory failure with encephalopathy, lactemia and metabolic acidosis of unclear etiology, in the setting of hypothermia and hypotension, concerning for septic shock. Given incontinence, possible post-ictal secondary to seizure.  Also with anemia with no active source of bleeding, hypocalcemia, and hypokalemia.     1. Acute Respiratory Failure- Patient currently on full ventilatory support. Will titrate vent settings to maintain SaO2 >90%, or pH >7.25. Consider low tidal volume ventilation strategy w/ goal Tv 4-6 cc/kg of ideal body weight and plateau pressure goal < 30. VAP prophylaxis with Peridex oral care. Aggressive chest PT and suctioning. Daily sedation vacation with spontaneous breathing trial if clinical condition warrants, discuss with respiratory therapy.    2. Septic Shock- Started on Zosyn empirically. Blood / urine / sputum cultures sent. Will narrow antibiotics pending culture results. Started on Marik protocol (Solu-Cortef / Vitamin C / Thiamine). Now on Vasopressors for shock, titrate Levophed to maintain MAP > 65.     3. Encephalopathy- Likely multifactorial, secondary to sepsis, metabolic process, and hypoxemia. Possible adverse reaction to new Suboxone ?? Acetaminophen and salicylate levels sent, utox negative. Serum osmolality to r/o other toxic ingestion. Will send prolactin and consider EEG to further eval for seizures. Will titrate vent settings and consider bicarb infusion for acidosis.    4. Anemia- Etiology unclear, baseline unknown, CTA neg for hemorrhage. Will continue to trend H/H, transfuse to maintain Hgb > 7.     5. Hypocalcemia- May be contributing to hypotension. Will replace Ca2+ and f/u repeat electrolyte levels.    6. Hypokalemia- K+ replaced, will f/u repeat BMP. 58 yo male, PMHx HTN, HLD, chronic back pain, BIBA after being found in bed, unresponsive with agonal respirations by his wife this morning. Hypoxemic respiratory failure with encephalopathy, lactemia and metabolic acidosis of unclear etiology, in the setting of hypothermia and hypotension, concerning for septic shock. Given incontinence, possible post-ictal secondary to seizure.  Also with anemia with no active source of bleeding, hypocalcemia, and hypokalemia.     1. Acute Respiratory Failure- Possible aspiration, with bibasilar atelectasis on CT. Patient currently on full ventilatory support. Will titrate vent settings to maintain SaO2 >90%, or pH >7.25. F/u repeat ABG, still hypoxemic with high PEEP and FiO2 requirements. Consider low tidal volume ventilation strategy w/ goal Tv 4-6 cc/kg of ideal body weight and plateau pressure goal < 30. VAP prophylaxis with Peridex oral care. Aggressive chest PT and suctioning. Daily sedation vacation with spontaneous breathing trial if clinical condition warrants, discuss with respiratory therapy.    2. Septic Shock- Started on Zosyn empirically. Blood / urine / sputum cultures sent. Will narrow antibiotics pending culture results. Started on Marik protocol (Solu-Cortef / Vitamin C / Thiamine). Now on Vasopressors for shock, titrate Levophed to maintain MAP > 65.     3. Encephalopathy- Likely multifactorial, secondary to sepsis, metabolic process, and hypoxemia. Possible adverse reaction to new Suboxone ?? Acetaminophen and salicylate levels sent, utox negative. Serum osmolality to r/o other toxic ingestion. Will send prolactin and consider EEG to further eval for seizures. Will titrate vent settings and consider bicarb infusion for acidosis.    4. Anemia- Etiology unclear, baseline unknown, CTA neg for hemorrhage. Will continue to trend H/H, transfuse to maintain Hgb > 7.     5. Hypocalcemia- May be contributing to hypotension. Will replace Ca2+ and f/u repeat electrolyte levels.    6. Hypokalemia- K+ replaced, will f/u repeat BMP. 58 yo male, PMHx HTN, HLD, chronic back pain, BIBA after being found in bed, unresponsive with agonal respirations by his wife this morning. Hypoxemic respiratory failure with encephalopathy, lactemia and metabolic acidosis of unclear etiology, in the setting of hypothermia and hypotension, concerning for septic shock. Given incontinence, possible post-ictal secondary to seizure.  Also with anemia with no active source of bleeding, hypocalcemia, and hypokalemia.     1. Acute Respiratory Failure- Possible aspiration, with bibasilar atelectasis on CT. Patient currently on full ventilatory support. Will titrate vent settings to maintain SaO2 >90%, or pH >7.25. F/u repeat ABG, still hypoxemic with high PEEP and FiO2 requirements. Consider low tidal volume ventilation strategy w/ goal Tv 4-6 cc/kg of ideal body weight and plateau pressure goal < 30. VAP prophylaxis with Peridex oral care. Aggressive chest PT and suctioning. Daily sedation vacation with spontaneous breathing trial if clinical condition warrants, discuss with respiratory therapy.    2. Septic Shock- Started on Zosyn empirically. Blood / urine / sputum cultures sent. Will narrow antibiotics pending culture results. Started on Marik protocol (Solu-Cortef / Vitamin C / Thiamine). Now on Vasopressors for shock, titrate Levophed to maintain MAP > 65.     3. Encephalopathy- Likely multifactorial, secondary to sepsis, metabolic process, and hypoxemia. Possible adverse reaction to new Suboxone ?? Acetaminophen and salicylate levels sent, utox negative. Serum osmolality to r/o other toxic ingestion. Will send prolactin and consider EEG to further eval for seizures. Will titrate vent settings and consider bicarb infusion for acidosis.    4. Anemia- Etiology unclear, baseline unknown, CTA neg for hemorrhage. Will continue to trend H/H, transfuse to maintain Hgb > 7.     5. Hypocalcemia- May be contributing to hypotension. Will replace Ca2+ and f/u repeat electrolyte levels.    6. Hypokalemia- K+ replaced, will f/u repeat BMP.    CC time: 48 minutes non-inclusive of procedures.

## 2017-08-30 NOTE — H&P ADULT - ATTENDING COMMENTS
Pt seen and examined with CCM PA, agree with above assessment and plan. Appears to be septic from aspiration event vs ?other source with shock and acute respiratory and renal failure does not appear to be acute opioid intoxication as he did not respond to narcan in ER, there is a question of seizure activity will check prolactin level if elevated will obtain EEG- continue sepsis protocol pressors, Vit C, steroids, Zosyn, ARDSnet Vent strategy - f/u ABG and lytes. Wife and daughter updated at bedside

## 2017-08-30 NOTE — ED PROVIDER NOTE - CRITICAL CARE INDICATION, MLM
patient was critically ill... Patient was critically ill with a high probability of imminent or life threatening deterioration. Patient was critically ill with a high probability of imminent or life threatening deterioration. PT ARRIVED WITH ALTERED MENTAL STATUS, DEPRESSED RESPIRATORY RATE, HYPOTENSIVE AND HYPOTHERMIC. IVS STARTED AND LABS DRAWN. STAT CXR, ADAMS, INTUBATION TO PROTECT AIRWAY, IV FLUIDS TO SUPPORT BP, MEDICATION IV TO ASSIST INTUBATION AND SEDATE PT TO PROTECT AIRWAY AND STAT CT SCAN BRAIN , CHEST, ABDOMEN AND PELVIS. ABG DONE AFTER CT AND LABS FOR MONITORING. ICU EVALUATION AND ADMISSION

## 2017-08-30 NOTE — PROGRESS NOTE ADULT - SUBJECTIVE AND OBJECTIVE BOX
Patient is a 57y old  Male who presents with a chief complaint of Unresponsive (30 Aug 2017 11:13)      BRIEF HOSPITAL COURSE: 58 yo male, PMHx HTN, HLD, chronic back pain, BIBA after being found in bed, unresponsive with agonal respirations by his wife this morning. Per pt's wife, pt usually takes 1 Percocet to sleep at night but ran out of his medication 3 days ago, and has not slept in 3 days. Went to [facility] Dr. Singh yesterday for "sleeping pill" and was given Suboxone 8mg-2mg SL film. Pt took 1 film and went to sleep at ~1900, was found by wife next AM at 0600 and she was unable to arouse him. Pt was found with agonal respirations and incontinent of urine. Pt was emergently intubated in ED for hypoxemic respiratory failure. Was treated with 4mg IVP Narcan without improvement in mental status. CT head performed- negative for acute CVA or hemorrhage. CTA chest / abd / pelvis was performed for low Hgb, negative for acute hemorrhage. Found to be hypotensive and hypothermic, initial lactate 3.9, treated with 4L NS IV fluid and started on Zosyn. Also with metabolic acidosis, treated sodium bicarbonate, and electrolyte derangements replaced. Per family, patient was in usual state of health yesterday without any complaints.    Events last 24 hours: as above, over the course of today despite volume resusc has required pressors and increasing oxygen req's on vent    PAST MEDICAL & SURGICAL HISTORY:  Chronic back pain  High cholesterol  Essential hypertension  No significant past surgical history      Review of Systems:  cannot be obtained due to patient medical condition/vented /sedated      Medications:  piperacillin/tazobactam IVPB. 3.375 Gram(s) IV Intermittent every 8 hours  azithromycin  IVPB   IV Intermittent   vancomycin  IVPB 750 milliGRAM(s) IV Intermittent every 12 hours    norepinephrine Infusion 0.05 MICROgram(s)/kG/Min IV Continuous <Continuous>  DOBUTamine Infusion 5 MICROgram(s)/kG/Min IV Continuous <Continuous>      dexmedetomidine Infusion 0.7 MICROgram(s)/kG/Hr IV Continuous <Continuous>  acetaminophen    Suspension 650 milliGRAM(s) Oral every 6 hours PRN      enoxaparin Injectable 40 milliGRAM(s) SubCutaneous daily    pantoprazole  Injectable 40 milliGRAM(s) IV Push daily      hydrocortisone sodium succinate Injectable 50 milliGRAM(s) IV Push every 6 hours  vasopressin Infusion 0.04 Unit(s)/Min IV Continuous <Continuous>    thiamine IVPB 200 milliGRAM(s) IV Intermittent every 12 hours  sodium bicarbonate  Infusion 0.092 mEq/kG/Hr IV Continuous <Continuous>      chlorhexidine 0.12% Liquid 15 milliLiter(s) Swish and Spit two times a day    Ascorbic Acid in 160mL NS 1500 milliGRAM(s) 160 milliLiter(s) IV Intermittent every 6 hours      Mode: AC/ CMV (Assist Control/ Continuous Mandatory Ventilation)  RR (machine): 22  TV (machine): 460  FiO2: 60  PEEP: 10  MAP: 15  PIP: 18      ICU Vital Signs Last 24 Hrs  T(C): 38.7 (30 Aug 2017 15:30), Max: 38.7 (30 Aug 2017 15:30)  T(F): 101.7 (30 Aug 2017 15:30), Max: 101.7 (30 Aug 2017 15:30)  HR: 112 (30 Aug 2017 17:19) (78 - 113)  BP: 84/64 (30 Aug 2017 14:30) (69/45 - 135/82)  BP(mean): 70 (30 Aug 2017 14:30) (51 - 101)  ABP: 102/62 (30 Aug 2017 17:19) (80/56 - 134/82)  ABP(mean): 74 (30 Aug 2017 17:19) (63 - 97)  RR: 31 (30 Aug 2017 17:19) (4 - 46)  SpO2: 97% (30 Aug 2017 17:19) (89% - 100%)      ABG - ( 30 Aug 2017 15:50 )  pH: 7.26  /  pCO2: 35    /  pO2: 202   / HCO3: 17    / Base Excess: -10.1 /  SaO2: 100                 I&O's Detail    30 Aug 2017 07:01  -  30 Aug 2017 17:31  --------------------------------------------------------  IN:    dexmedetomidine Infusion: 30.4 mL    multiple electrolytes Injection Type 1multiple electrolytes Injection Type 1: 400 mL    norepinephrine Infusion: 162.4 mL    sodium bicarbonate  Infusion: 75 mL    Solution: 160 mL    Solution: 100 mL    Solution: 100 mL    Solution: 200 mL    vasopressin Infusion: 9.6 mL  Total IN: 1237.4 mL    OUT:    Indwelling Catheter - Urethral: 585 mL  Total OUT: 585 mL    Total NET: 652.4 mL            LABS:                        15.1   15.0  )-----------( 333      ( 30 Aug 2017 14:40 )             45.5         143  |  106  |  30.0<H>  ----------------------------<  129<H>  6.1<HH>   |  18.0<L>  |  2.02<H>    Ca    8.2<L>      30 Aug 2017 14:40  Phos  3.7       Mg     1.8         TPro  3.2<L>  /  Alb  1.9<L>  /  TBili  0.1<L>  /  DBili  x   /  AST  43<H>  /  ALT  32  /  AlkPhos  33<L>        CARDIAC MARKERS ( 30 Aug 2017 10:43 )  x     / x     / 765 U/L / x     / 46.3 ng/mL  CARDIAC MARKERS ( 30 Aug 2017 07:46 )  x     / 0.04 ng/mL / x     / x     / x          CAPILLARY BLOOD GLUCOSE  162 (30 Aug 2017 07:20)    Blood Gas Profile - Arterial (17 @ 15:50)    pH, Arterial: 7.26    pCO2, Arterial: 35 mmHg    pO2, Arterial: 202 mmHg    HCO3, Arterial: 17 mmoL/L    Base Excess, Arterial: -10.1 mmol/L    Oxygen Saturation, Arterial: 100 %    FIO2, Arterial: 1.0    Blood Gas Comments Arterial: ac22/460/1.0/+12    Blood Gas Source Arterial: Arterial    Procalcitonin, Serum (17 @ 14:41)    Procalcitonin, Serum: 19.10: Procalcitonin (PCT) Interpretation (ng/mL) - Diagnosis of systemic  bacterial infection/sepsis  PCT < 0.5: Systemic infection (sepsis) is not likely and risk for  progression to severe systemic infection is low. Local bacterial  infection is possible.19.10:  If early sepsis is suspected clinically, PCT  should be re-assessed in 6-24 hours.  PCT >/= 0.5 but < 2.0: Systemic infection (sepsis) is possible, but other  conditions are known to elevate PCT as well. Moderate risk for  progression to severe syste19.10: mey infection. The patient should be closely  monitored both clinically and by re-assessing PCT within 6-24 hours.  PCT >/= 2.0 but < 10.0: Systemic infection (sepsis) is likely, unless  other causes are known. High risk of progression to severe syste19.10: mey  infection (severe sepsis/septic shock).  PCT >/= 10.0: Important systemic inflammatory response, almost  exclusively due to severe bacterial sepsis or septic shock. High  likelihood of severe sepsis or septic shock. ng/mL        PT/INR - ( 30 Aug 2017 14:40 )   PT: 13.0 sec;   INR: 1.18 ratio         PTT - ( 30 Aug 2017 07:46 )  PTT:52.6 sec  Urinalysis Basic - ( 30 Aug 2017 07:54 )    Color: Yellow / Appearance: Clear / S.025 / pH: x  Gluc: x / Ketone: Negative  / Bili: Negative / Urobili: Negative mg/dL   Blood: x / Protein: Negative mg/dL / Nitrite: Negative   Leuk Esterase: Negative / RBC: 6-10 /HPF / WBC 0-2   Sq Epi: x / Non Sq Epi: x / Bacteria: x      CULTURES:  Culture - Sputum . (17 @ 13:40)    Gram Stain:   Numerous White blood cells  Numerous Gram Positive Cocci in Pairs and Chains  Numerous Gram Positive Cocci in Clusters    Specimen Source: .Sputum Sputum        Physical Examination:    General: sedated, vented    HEENT: Pupils equal, reactive to light.  Symmetric.    PULM: Course bilat with fair air entry, mod sputum    CVS: tachycardia    ABD: Soft, nondistended, nontender, normoactive bowel sounds, no masses    EXT: No edema, nontender    SKIN: cool and mottled    NEURO: moving all extremities, periods of arousal through sedation with purposeful response    RADIOLOGY:   < from: Xray Chest 1 View AP/PA. (17 @ 11:18) >  FINDINGS: Right internal jugular central venous catheter seen with tip   overlying the superior vena cava. No pneumothorax is seen. ET tube is   just above the level of the clavicular heads. Enterictube noted with tip   below the inferior margin of the image. Bilateral airspace opacities are   noted, greater on the left. Cardiomediastinal silhouette is prominent   which main part be due to the portable technique. The osseous structures   are grossly unremarkable.    IMPRESSION:     Lines and tubes unchanged.    Bilateral airspace opacities may represent pneumonia versus edema.    < end of copied text >  < from: CT Angio Chest w/ IV Cont (17 @ 08:38) >  FINDINGS:    VESSELS: No dissection, intramural hematoma or central pulmonary   embolism. Single origin of the SMA and celiac axis and inferior   mesenteric arteries are patent. Extensive atherosclerotic plaque of the   abdominal aorta and pelvic vessels. Renal arteries patent.    INCIDENTAL:    CHEST WALL AND LOWER NECK: ET tube above the peace. NG tube in the   stomach. Heterogeneously nodular thyroid gland, routine thyroid   ultrasound suggest.  MEDIASTINUM AND MO: Within normal limits  HEART: Within normal limits  LUNG AND LARGE AIRWAYS: Airspace consolidations in the lower lobes and   posterior segment right upper lobe, correlate for atelectasis/aspiration.    LIVER: Severe steatosis.  BILE DUCTS: Normal caliber  GALLBLADDER: No calcified gallstones. Normal caliber wall  PANCREAS: within normal limits  SPLEEN: within normal limits  ADRENALS: within normal limits  KIDNEYS: within normal limits    REPRODUCTIVE ORGANS: no pelvic masses  BLADDER: Cali catheter    BOWEL: Sigmoid diverticulosis, otherwise unremarkable.  PERITONEUM: No ascites or freeair, no fluid collection    RETROPERITONEUM: No enlarged retroperitoneal or pelvic nodes  ABDOMINAL WALL: Within normal limits  BONES: Within normal limits    IMPRESSION:     No acute aortic findings.    Bilateral lung consolidations, correlate for atelectasis/aspiration.    No acute findings abdomen pelvis.    Incidental comments with recommendations as above.    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    < end of copied text >  < from: CT Head No Cont (17 @ 08:34) >    There is no compelling evidencefor an acute infarction. There is no   evidence of mass, mass effect, midline shift or extra-axial fluid   collection. The lateral ventricles and cortical sulci are normal in size   and configuration. Moderate inflammatory mucosal changes are seen   throughout the various portions of the paranasal sinuses. Trace right   mastoid air cell effusion. The orbits and left mastoid air cells are   unremarkable. The calvarium is intact. The patient is intubated.   Orogastric tube is partially visualized.    IMPRESSION:    No acute intracranial pathology.    < end of copied text >  < from: TTE Echo Complete w/Doppler (17 @ 16:22) >     Summary:   1. Left ventricular ejection fraction, by visual estimation, is 40%.   2. Technically difficult study.   3. Mildly to moderately decreased global left ventricular systolic   function.   4. Apical septal segment, apical lateral segment, and apex are abnormal   as described above.   5. Abnormal septal motion consistent with left bundle branch block.   6. Spectral Doppler shows impaired relaxation pattern of left   ventricular myocardial filling (Grade I diastolic dysfunction).   7. LV was imaged on ionotropic support.   8. Severely enlarged right ventricle.   9. Moderately reduced RV systolic function.  10. Due to Rv dilatation TR jet may underestimate pulmonary pressure.  11. Mildly dilated right atrium.  12. There is no evidence of pericardial effusion.  13. Mild mitral annular calcification.  14. Mild mitral valve regurgitation.  15. Thickening of the anterior and posterior mitral valve leaflets.  16. Sclerotic aortic valve with normal opening.  17. Endocardial visualization was enhanced with intravenous echo contrast.    < end of copied text >    ***Please see the addendum at the top of this report. It may contain   additional important information or changes.****    < end of copied text >    CRITICAL CARE TIME SPENT: 75min excluding procedures

## 2017-08-30 NOTE — PROGRESS NOTE ADULT - SUBJECTIVE AND OBJECTIVE BOX
Clinical status guarded, with increasing vasopressor requirements. Bedside echo revealed impaired inotropy, started on Dobutamine for possible cardiogenic shock. Pending official echo. Also with ARF and hyperkalemia, Rosa Maria-naseem ordered, discussed possible need for emergent HD with Dr. Tucker. Started on sodium bicarbonate infusion for metabolic acidosis. Family at bedside updated on clinical course. Clinical status guarded, with increasing vasopressor requirements. Bedside echo revealed impaired inotropy, started on Dobutamine for possible cardiogenic shock. Pending official echo. Vancomycin added for sputum culture with +Gram positive cocci. Also with ARF and hyperkalemia, Saulo ordered, discussed possible need for emergent HD with Dr. Tucker. Started on sodium bicarbonate infusion for metabolic acidosis. Family at bedside updated on clinical course. Clinical status guarded, with increasing vasopressor requirements. Bedside echo revealed impaired inotropy, started on Dobutamine for possible cardiogenic shock. Pending official echo. Vancomycin added for sputum culture with +Gram positive cocci. Also with ARF and hyperkalemia, Rosa Maria-exalate ordered, discussed possible need for emergent HD with Dr. Tucker. Started on sodium bicarbonate infusion for metabolic acidosis. Family at bedside updated on clinical course.    Additional 30 minutes critical care time spent evaluating and treating this critically ill patient, collaborating with interdisciplinary team, and discussing with family at bedside.

## 2017-08-30 NOTE — CONSULT NOTE ADULT - ASSESSMENT
ARF, oliguric with hyperkalemia and lactic acidosis  Likely ATN due to hypotension and IV contrast==> no hx of CRF noted  Urine output starting to improve  - continue medical management of hyperkalemia and acidosis for now   - avoid further potential nephrotoxic agents  - await repeat labs; may need to consider dialysis depending upon clinical course

## 2017-08-30 NOTE — PROGRESS NOTE ADULT - PROBLEM SELECTOR PLAN 3
echo as above, poor EF with large weak RV, no PE on CTA, likely cardiogenic component of septic shock  Flotrac attached to patient which initially showed CI 1.2, prior to echo Dobutamine empirically added at 5mcg and CI increased to >2

## 2017-08-30 NOTE — ED PROVIDER NOTE - PROGRESS NOTE DETAILS
PATIENT ANEMIC. NEEDS CTA CHEST ABDOMEN PELVIS. LIFE THREATENING SITUATION MUST DO WITHOUT CREATININE

## 2017-08-31 LAB
ALBUMIN SERPL ELPH-MCNC: 3 G/DL — LOW (ref 3.3–5.2)
ALP SERPL-CCNC: 60 U/L — SIGNIFICANT CHANGE UP (ref 40–120)
ALT FLD-CCNC: 1019 U/L — HIGH
ANION GAP SERPL CALC-SCNC: 14 MMOL/L — SIGNIFICANT CHANGE UP (ref 5–17)
AST SERPL-CCNC: 790 U/L — HIGH
BILIRUB SERPL-MCNC: 0.7 MG/DL — SIGNIFICANT CHANGE UP (ref 0.4–2)
BUN SERPL-MCNC: 34 MG/DL — HIGH (ref 8–20)
CALCIUM SERPL-MCNC: 7.7 MG/DL — LOW (ref 8.6–10.2)
CHLORIDE SERPL-SCNC: 107 MMOL/L — SIGNIFICANT CHANGE UP (ref 98–107)
CK MB CFR SERPL CALC: 62.2 NG/ML — HIGH (ref 0–6.7)
CK SERPL-CCNC: 1766 U/L — HIGH (ref 30–200)
CO2 SERPL-SCNC: 22 MMOL/L — SIGNIFICANT CHANGE UP (ref 22–29)
CREAT SERPL-MCNC: 1.48 MG/DL — HIGH (ref 0.5–1.3)
GAS PNL BLDA: SIGNIFICANT CHANGE UP
GLUCOSE SERPL-MCNC: 130 MG/DL — HIGH (ref 70–115)
HBA1C BLD-MCNC: 5.6 % — SIGNIFICANT CHANGE UP (ref 4–5.6)
HCT VFR BLD CALC: 38.8 % — LOW (ref 42–52)
HGB BLD-MCNC: 13.2 G/DL — LOW (ref 14–18)
LEGIONELLA AG UR QL: NEGATIVE — SIGNIFICANT CHANGE UP
MAGNESIUM SERPL-MCNC: 2.2 MG/DL — SIGNIFICANT CHANGE UP (ref 1.6–2.6)
MCHC RBC-ENTMCNC: 29.9 PG — SIGNIFICANT CHANGE UP (ref 27–31)
MCHC RBC-ENTMCNC: 34 G/DL — SIGNIFICANT CHANGE UP (ref 32–36)
MCV RBC AUTO: 88 FL — SIGNIFICANT CHANGE UP (ref 80–94)
PHOSPHATE SERPL-MCNC: 2.8 MG/DL — SIGNIFICANT CHANGE UP (ref 2.4–4.7)
PLATELET # BLD AUTO: 177 K/UL — SIGNIFICANT CHANGE UP (ref 150–400)
POTASSIUM SERPL-MCNC: 3.7 MMOL/L — SIGNIFICANT CHANGE UP (ref 3.5–5.3)
POTASSIUM SERPL-SCNC: 3.7 MMOL/L — SIGNIFICANT CHANGE UP (ref 3.5–5.3)
PROLACTIN SERPL-MCNC: 18.4 NG/ML — SIGNIFICANT CHANGE UP (ref 4.1–18.4)
PROT SERPL-MCNC: 5.2 G/DL — LOW (ref 6.6–8.7)
RBC # BLD: 4.41 M/UL — LOW (ref 4.6–6.2)
RBC # FLD: 14.5 % — SIGNIFICANT CHANGE UP (ref 11–15.6)
SODIUM SERPL-SCNC: 143 MMOL/L — SIGNIFICANT CHANGE UP (ref 135–145)
TROPONIN T SERPL-MCNC: 0.38 NG/ML — HIGH (ref 0–0.06)
WBC # BLD: 9.1 K/UL — SIGNIFICANT CHANGE UP (ref 4.8–10.8)
WBC # FLD AUTO: 9.1 K/UL — SIGNIFICANT CHANGE UP (ref 4.8–10.8)

## 2017-08-31 PROCEDURE — 93010 ELECTROCARDIOGRAM REPORT: CPT

## 2017-08-31 PROCEDURE — 71010: CPT | Mod: 26

## 2017-08-31 PROCEDURE — 99291 CRITICAL CARE FIRST HOUR: CPT

## 2017-08-31 RX ORDER — DOBUTAMINE HCL 250MG/20ML
2.5 VIAL (ML) INTRAVENOUS
Qty: 500 | Refills: 0 | Status: DISCONTINUED | OUTPATIENT
Start: 2017-08-31 | End: 2017-08-31

## 2017-08-31 RX ORDER — ACETYLCYSTEINE 200 MG/ML
8 VIAL (ML) MISCELLANEOUS ONCE
Qty: 0 | Refills: 0 | Status: COMPLETED | OUTPATIENT
Start: 2017-08-31 | End: 2017-08-31

## 2017-08-31 RX ORDER — ONDANSETRON 8 MG/1
4 TABLET, FILM COATED ORAL EVERY 6 HOURS
Qty: 0 | Refills: 0 | Status: DISCONTINUED | OUTPATIENT
Start: 2017-08-31 | End: 2017-09-05

## 2017-08-31 RX ORDER — ACETYLCYSTEINE 200 MG/ML
4.1 VIAL (ML) MISCELLANEOUS ONCE
Qty: 0 | Refills: 0 | Status: COMPLETED | OUTPATIENT
Start: 2017-08-31 | End: 2017-08-31

## 2017-08-31 RX ORDER — ACETYLCYSTEINE 200 MG/ML
12 VIAL (ML) MISCELLANEOUS ONCE
Qty: 0 | Refills: 0 | Status: COMPLETED | OUTPATIENT
Start: 2017-08-31 | End: 2017-08-31

## 2017-08-31 RX ADMIN — Medication 65 GRAM(S): at 18:38

## 2017-08-31 RX ADMIN — Medication 102 MILLIGRAM(S): at 05:04

## 2017-08-31 RX ADMIN — DEXMEDETOMIDINE HYDROCHLORIDE IN 0.9% SODIUM CHLORIDE 14.28 MICROGRAM(S)/KG/HR: 4 INJECTION INTRAVENOUS at 04:42

## 2017-08-31 RX ADMIN — ONDANSETRON 4 MILLIGRAM(S): 8 TABLET, FILM COATED ORAL at 18:09

## 2017-08-31 RX ADMIN — PIPERACILLIN AND TAZOBACTAM 25 GRAM(S): 4; .5 INJECTION, POWDER, LYOPHILIZED, FOR SOLUTION INTRAVENOUS at 17:33

## 2017-08-31 RX ADMIN — Medication 50 MILLIGRAM(S): at 17:32

## 2017-08-31 RX ADMIN — Medication 150 MILLIGRAM(S): at 05:04

## 2017-08-31 RX ADMIN — PIPERACILLIN AND TAZOBACTAM 25 GRAM(S): 4; .5 INJECTION, POWDER, LYOPHILIZED, FOR SOLUTION INTRAVENOUS at 01:24

## 2017-08-31 RX ADMIN — DEXMEDETOMIDINE HYDROCHLORIDE IN 0.9% SODIUM CHLORIDE 14.28 MICROGRAM(S)/KG/HR: 4 INJECTION INTRAVENOUS at 01:24

## 2017-08-31 RX ADMIN — Medication 50 MILLIGRAM(S): at 05:03

## 2017-08-31 RX ADMIN — CHLORHEXIDINE GLUCONATE 15 MILLILITER(S): 213 SOLUTION TOPICAL at 05:04

## 2017-08-31 RX ADMIN — Medication 50 MILLIGRAM(S): at 12:05

## 2017-08-31 RX ADMIN — PANTOPRAZOLE SODIUM 40 MILLIGRAM(S): 20 TABLET, DELAYED RELEASE ORAL at 12:05

## 2017-08-31 RX ADMIN — Medication 130.13 GRAM(S): at 14:31

## 2017-08-31 RX ADMIN — Medication 250 GRAM(S): at 13:09

## 2017-08-31 RX ADMIN — PIPERACILLIN AND TAZOBACTAM 25 GRAM(S): 4; .5 INJECTION, POWDER, LYOPHILIZED, FOR SOLUTION INTRAVENOUS at 12:04

## 2017-08-31 RX ADMIN — Medication 102 MILLIGRAM(S): at 17:33

## 2017-08-31 RX ADMIN — ENOXAPARIN SODIUM 40 MILLIGRAM(S): 100 INJECTION SUBCUTANEOUS at 12:04

## 2017-08-31 NOTE — CONSULT NOTE ADULT - SUBJECTIVE AND OBJECTIVE BOX
[place] [facility], [facility]                                   [address] [place] [address]                                                      PHONE: [phone]                                                         FAX: [phone]  [email or web address]  ---------------------------------------------------------------------------------------------------------------------------------    Reason for Consult: Shock  CVS: Braden  HPI:  KIMMY GRANT is an 57y Male PMHx HTN, HLD, nonobstructive CAD on cath , SPECT 1/15: no evidence of ischemia, past tobacco use, ECHO 1/15:EF 50-55% no significant valvular dz admitted after taking suboxone x 1 dose and then was found unresponsive on his back for possibly several hours and agonal breathing.  He was intubated and successfully extubated.  He originally required levo, ministerio and was persistently hypotensive until dobutamine was started.  He was subsequently weaned off levo and ministerio but requires dobutamine.    PAST MEDICAL & SURGICAL HISTORY:  Chronic back pain  High cholesterol  Essential hypertension  No significant past surgical history      No Known Allergies      MEDICATIONS  (STANDING):  pantoprazole  Injectable 40 milliGRAM(s) IV Push daily  enoxaparin Injectable 40 milliGRAM(s) SubCutaneous daily  piperacillin/tazobactam IVPB. 3.375 Gram(s) IV Intermittent every 8 hours  hydrocortisone sodium succinate Injectable 50 milliGRAM(s) IV Push every 6 hours  Ascorbic Acid in 160mL NS 1500 milliGRAM(s) 160 milliLiter(s) IV Intermittent every 6 hours  thiamine IVPB 200 milliGRAM(s) IV Intermittent every 12 hours  vancomycin  IVPB 750 milliGRAM(s) IV Intermittent every 12 hours  insulin lispro (HumaLOG) corrective regimen sliding scale   SubCutaneous every 4 hours  dextrose 5%. 1000 milliLiter(s) (50 mL/Hr) IV Continuous <Continuous>  dextrose 50% Injectable 12.5 Gram(s) IV Push once  dextrose 50% Injectable 25 Gram(s) IV Push once  dextrose 50% Injectable 25 Gram(s) IV Push once  acetylcysteine IVPB 12 Gram(s) IV Intermittent once  acetylcysteine IVPB 4.1 Gram(s) IV Intermittent once  acetylcysteine IVPB 8 Gram(s) IV Intermittent once  DOBUTamine Infusion 2.5 MICROgram(s)/kG/Min (6.12 mL/Hr) IV Continuous <Continuous>    MEDICATIONS  (PRN):  acetaminophen    Suspension 650 milliGRAM(s) Oral every 6 hours PRN For Temp greater than 38.5 C (101.3 F)  dextrose Gel 1 Dose(s) Oral once PRN Blood Glucose LESS THAN 70 milliGRAM(s)/deciliter  glucagon  Injectable 1 milliGRAM(s) IntraMuscular once PRN Glucose LESS THAN 70 milligrams/deciliter      Social History:  Cigarettes:   past               Alchohol:       no          Illicit Drug Abuse:  no  FHX NC  ROS: Negative other than as mentioned in HPI.    Vital Signs Last 24 Hrs  T(C): 37.3 (31 Aug 2017 09:00), Max: 39.4 (30 Aug 2017 18:45)  T(F): 99.1 (31 Aug 2017 09:00), Max: 102.9 (30 Aug 2017 18:45)  HR: 83 (31 Aug 2017 09:00) (78 - 118)  BP: 84/64 (30 Aug 2017 14:30) (73/54 - 135/82)  BP(mean): 70 (30 Aug 2017 14:30) (59 - 101)  RR: 34 (31 Aug 2017 09:00) (22 - 44)  SpO2: 100% (31 Aug 2017 09:00) (89% - 100%)  ICU Vital Signs Last 24 Hrs  KIMMY GRANT  I&O's Detail    30 Aug 2017 07:01  -  31 Aug 2017 07:00  --------------------------------------------------------  IN:    dexmedetomidine Infusion: 260.6 mL    DOBUTamine Infusion: 170.9 mL    DOBUTamine Infusion: 12.2 mL    multiple electrolytes Injection Type 1multiple electrolytes Injection Type 1: 500 mL    multiple electrolytes Injection Type 1multiple electrolytes Injection Type 1: 500 mL    norepinephrine Infusion: 885.9 mL    sodium bicarbonate  Infusion: 600 mL    Solution: 50 mL    Solution: 250 mL    Solution: 150 mL    Solution: 580 mL    Solution: 300 mL    Solution: 100 mL    Solution: 200 mL    vasopressin Infusion: 9.6 mL  Total IN: 4569.2 mL    OUT:    Indwelling Catheter - Urethral: 1440 mL  Total OUT: 1440 mL    Total NET: 3129.2 mL      31 Aug 2017 07:  -  31 Aug 2017 11:10  --------------------------------------------------------  IN:    DOBUTamine Infusion: 12.2 mL    Solution: 160 mL  Total IN: 172.2 mL    OUT:    Indwelling Catheter - Urethral: 110 mL  Total OUT: 110 mL    Total NET: 62.2 mL        I&O's Summary    30 Aug 2017 07:  -  31 Aug 2017 07:00  --------------------------------------------------------  IN: 4569.2 mL / OUT: 1440 mL / NET: 3129.2 mL    31 Aug 2017 07:  -  31 Aug 2017 11:10  --------------------------------------------------------  IN: 172.2 mL / OUT: 110 mL / NET: 62.2 mL      Drug Dosing Weight  KIMMY GRANT  Mode: CPAP with PS, FiO2: 30, PEEP: 5, PS: 10, MAP: 9, PIP: 16    PHYSICAL EXAM:  General: Appears well developed, well nourished alert and cooperative.  HEENT: Head; normocephalic, atraumatic.  Eyes: Pupils reactive, cornea wnl.  Neck: Supple, no nodes adenopathy, no NVD or carotid bruit or thyromegaly.  CARDIOVASCULAR: Normal S1 and S2, No murmur, rub, gallop or lift.   LUNGS: No rales, rhonchi or wheeze. Normal breath sounds bilaterally.  ABDOMEN: Soft, nontender without mass or organomegaly. bowel sounds normoactive.  EXTREMITIES: No clubbing, cyanosis or edema. Distal pulses wnl.   SKIN: warm and dry with normal turgor.  NEURO: Alert/oriented x 3/normal motor exam. No pathologic reflexes.    PSYCH: normal affect.        LABS:                        13.2   9.1   )-----------( 177      ( 31 Aug 2017 05:48 )             38.8         143  |  107  |  34.0<H>  ----------------------------<  130<H>  3.7   |  22.0  |  1.48<H>    Ca    7.7<L>      31 Aug 2017 05:48  Phos  2.8       Mg     2.2         TPro  5.2<L>  /  Alb  3.0<L>  /  TBili  0.7  /  DBili  x   /  AST  790<H>  /  ALT  1019<H>  /  AlkPhos  60      KIMMY TORREGROSSA  CARDIAC MARKERS ( 31 Aug 2017 05:48 )  x     / 0.38 ng/mL / 1766 U/L / x     / 62.2 ng/mL  CARDIAC MARKERS ( 30 Aug 2017 22:06 )  x     / 0.44 ng/mL / 2462 U/L / x     / 101.0 ng/mL  CARDIAC MARKERS ( 30 Aug 2017 20:15 )  x     / x     / 2569 U/L / x     / 112.6 ng/mL  CARDIAC MARKERS ( 30 Aug 2017 10:43 )  x     / x     / 765 U/L / x     / 46.3 ng/mL  CARDIAC MARKERS ( 30 Aug 2017 07:46 )  x     / 0.04 ng/mL / x     / x     / x          PT/INR - ( 30 Aug 2017 14:40 )   PT: 13.0 sec;   INR: 1.18 ratio         PTT - ( 30 Aug 2017 07:46 )  PTT:52.6 sec  Urinalysis Basic - ( 30 Aug 2017 07:54 )    Color: Yellow / Appearance: Clear / S.025 / pH: x  Gluc: x / Ketone: Negative  / Bili: Negative / Urobili: Negative mg/dL   Blood: x / Protein: Negative mg/dL / Nitrite: Negative   Leuk Esterase: Negative / RBC: 6-10 /HPF / WBC 0-2   Sq Epi: x / Non Sq Epi: x / Bacteria: x        RADIOLOGY & ADDITIONAL STUDIES:    INTERPRETATION OF TELEMETRY (personally reviewed): 3 beats NSVT     ECG: NS @ 98 no acute ischemic changes    ECHO: < from: TTE Echo Complete w/Doppler (17 @ 16:22) >  Summary:   1. Left ventricular ejection fraction, by visual estimation, is 40%.   2. Technically difficult study.   3. Mildly to moderately decreased global left ventricular systolic   function.   4. Apical septal segment, apical lateral segment, and apex are abnormal   as described above.   5. Abnormal septal motion consistent with left bundle branch block.   6. Spectral Doppler shows impaired relaxation pattern of left   ventricular myocardial filling (Grade I diastolic dysfunction).   7. LV was imaged on ionotropic support.   8. Severely enlarged right ventricle.   9. Moderately reduced RV systolic function.  10. Due to Rv dilatation TR jet may underestimate pulmonary pressure.  11. Mildly dilated right atrium.  12. There is no evidence of pericardial effusion.  13. Mild mitral annular calcification.  14. Mild mitral valve regurgitation.  15. Thickening of the anterior and posterior mitral valve leaflets.  16. Sclerotic aortic valve with normal opening.  17. Endocardial visualization was enhanced with intravenous echo contrast.     MD Audrey Electronically signed on 2017 at 5:29:29 PM    < end of copied text >           Assessment and Plan:  In summary, KIMMY GRANT is an 57y Male with past medical history significant for HTN, HLD, nonobstructive CAD on cath , SPECT 1/15: no evidence of ischemia, past tobacco use, ECHO 1/15:EF 50-55% no significant valvular dz admitted after taking suboxone x 1 dose and then was found unresponsive on his back for possibly several hours and agonal breathing.  He was intubated and successfully extubated.  He originally required levo, ministerio and was persistently hypotensive until dobutamine was started.  He was subsequently weaned off levo and ministerio but requires dobutamine.    shock    1) Newly reduced EF etiology unclear (possibly related to being critically ill but will need cardiac cath prior to discharge)  2) Agree with ASA  3) Wean from dobutamine as tolerated   4) Will also repeat echo in a few days to assesss EF [place] [facility], [facility]                                   [address] [place] [address]                                                      PHONE: [phone]                                                         FAX: [phone]  [email or web address]  ---------------------------------------------------------------------------------------------------------------------------------    Reason for Consult: Shock  CVS: Braden  HPI:  KIMMY GRANT is an 57y Male PMHx HTN, HLD, nonobstructive CAD on cath , SPECT 1/15: no evidence of ischemia, past tobacco use, ECHO 1/15:EF 50-55% no significant valvular dz admitted after taking suboxone x 1 dose and then was found unresponsive on his back for possibly several hours and agonal breathing.  He was intubated and successfully extubated.  He originally required levo, ministerio and was persistently hypotensive until dobutamine was started.  He was subsequently weaned off levo and ministerio but requires dobutamine.    PAST MEDICAL & SURGICAL HISTORY:  Chronic back pain  High cholesterol  Essential hypertension  No significant past surgical history      No Known Allergies      MEDICATIONS  (STANDING):  pantoprazole  Injectable 40 milliGRAM(s) IV Push daily  enoxaparin Injectable 40 milliGRAM(s) SubCutaneous daily  piperacillin/tazobactam IVPB. 3.375 Gram(s) IV Intermittent every 8 hours  hydrocortisone sodium succinate Injectable 50 milliGRAM(s) IV Push every 6 hours  Ascorbic Acid in 160mL NS 1500 milliGRAM(s) 160 milliLiter(s) IV Intermittent every 6 hours  thiamine IVPB 200 milliGRAM(s) IV Intermittent every 12 hours  vancomycin  IVPB 750 milliGRAM(s) IV Intermittent every 12 hours  insulin lispro (HumaLOG) corrective regimen sliding scale   SubCutaneous every 4 hours  dextrose 5%. 1000 milliLiter(s) (50 mL/Hr) IV Continuous <Continuous>  dextrose 50% Injectable 12.5 Gram(s) IV Push once  dextrose 50% Injectable 25 Gram(s) IV Push once  dextrose 50% Injectable 25 Gram(s) IV Push once  acetylcysteine IVPB 12 Gram(s) IV Intermittent once  acetylcysteine IVPB 4.1 Gram(s) IV Intermittent once  acetylcysteine IVPB 8 Gram(s) IV Intermittent once  DOBUTamine Infusion 2.5 MICROgram(s)/kG/Min (6.12 mL/Hr) IV Continuous <Continuous>    MEDICATIONS  (PRN):  acetaminophen    Suspension 650 milliGRAM(s) Oral every 6 hours PRN For Temp greater than 38.5 C (101.3 F)  dextrose Gel 1 Dose(s) Oral once PRN Blood Glucose LESS THAN 70 milliGRAM(s)/deciliter  glucagon  Injectable 1 milliGRAM(s) IntraMuscular once PRN Glucose LESS THAN 70 milligrams/deciliter      Social History:  Cigarettes:   past               Alchohol:       no          Illicit Drug Abuse:  no  FHX NC  ROS: Negative other than as mentioned in HPI.    Vital Signs Last 24 Hrs  T(C): 37.3 (31 Aug 2017 09:00), Max: 39.4 (30 Aug 2017 18:45)  T(F): 99.1 (31 Aug 2017 09:00), Max: 102.9 (30 Aug 2017 18:45)  HR: 83 (31 Aug 2017 09:00) (78 - 118)  BP: 84/64 (30 Aug 2017 14:30) (73/54 - 135/82)  BP(mean): 70 (30 Aug 2017 14:30) (59 - 101)  RR: 34 (31 Aug 2017 09:00) (22 - 44)  SpO2: 100% (31 Aug 2017 09:00) (89% - 100%)  ICU Vital Signs Last 24 Hrs  KIMMY GRANT  I&O's Detail    30 Aug 2017 07:01  -  31 Aug 2017 07:00  --------------------------------------------------------  IN:    dexmedetomidine Infusion: 260.6 mL    DOBUTamine Infusion: 170.9 mL    DOBUTamine Infusion: 12.2 mL    multiple electrolytes Injection Type 1multiple electrolytes Injection Type 1: 500 mL    multiple electrolytes Injection Type 1multiple electrolytes Injection Type 1: 500 mL    norepinephrine Infusion: 885.9 mL    sodium bicarbonate  Infusion: 600 mL    Solution: 50 mL    Solution: 250 mL    Solution: 150 mL    Solution: 580 mL    Solution: 300 mL    Solution: 100 mL    Solution: 200 mL    vasopressin Infusion: 9.6 mL  Total IN: 4569.2 mL    OUT:    Indwelling Catheter - Urethral: 1440 mL  Total OUT: 1440 mL    Total NET: 3129.2 mL      31 Aug 2017 07:  -  31 Aug 2017 11:10  --------------------------------------------------------  IN:    DOBUTamine Infusion: 12.2 mL    Solution: 160 mL  Total IN: 172.2 mL    OUT:    Indwelling Catheter - Urethral: 110 mL  Total OUT: 110 mL    Total NET: 62.2 mL        I&O's Summary    30 Aug 2017 07:  -  31 Aug 2017 07:00  --------------------------------------------------------  IN: 4569.2 mL / OUT: 1440 mL / NET: 3129.2 mL    31 Aug 2017 07:  -  31 Aug 2017 11:10  --------------------------------------------------------  IN: 172.2 mL / OUT: 110 mL / NET: 62.2 mL      Drug Dosing Weight  KIMMY GRANT  Mode: CPAP with PS, FiO2: 30, PEEP: 5, PS: 10, MAP: 9, PIP: 16    PHYSICAL EXAM:  General: Appears well developed, well nourished alert and cooperative.  HEENT: Head; normocephalic, atraumatic.  Eyes: Pupils reactive, cornea wnl.  Neck: Supple, no nodes adenopathy, no NVD or carotid bruit or thyromegaly.  CARDIOVASCULAR: Normal S1 and S2, No murmur, rub, gallop or lift.   LUNGS: No rales, rhonchi or wheeze. Normal breath sounds bilaterally.  ABDOMEN: Soft, nontender without mass or organomegaly. bowel sounds normoactive.  EXTREMITIES: No clubbing, cyanosis or edema. Distal pulses wnl.   SKIN: warm and dry with normal turgor.  NEURO: Alert/oriented x 3/normal motor exam. No pathologic reflexes.    PSYCH: normal affect.        LABS:                        13.2   9.1   )-----------( 177      ( 31 Aug 2017 05:48 )             38.8         143  |  107  |  34.0<H>  ----------------------------<  130<H>  3.7   |  22.0  |  1.48<H>    Ca    7.7<L>      31 Aug 2017 05:48  Phos  2.8       Mg     2.2         TPro  5.2<L>  /  Alb  3.0<L>  /  TBili  0.7  /  DBili  x   /  AST  790<H>  /  ALT  1019<H>  /  AlkPhos  60      KIMMY TORREGROSSA  CARDIAC MARKERS ( 31 Aug 2017 05:48 )  x     / 0.38 ng/mL / 1766 U/L / x     / 62.2 ng/mL  CARDIAC MARKERS ( 30 Aug 2017 22:06 )  x     / 0.44 ng/mL / 2462 U/L / x     / 101.0 ng/mL  CARDIAC MARKERS ( 30 Aug 2017 20:15 )  x     / x     / 2569 U/L / x     / 112.6 ng/mL  CARDIAC MARKERS ( 30 Aug 2017 10:43 )  x     / x     / 765 U/L / x     / 46.3 ng/mL  CARDIAC MARKERS ( 30 Aug 2017 07:46 )  x     / 0.04 ng/mL / x     / x     / x          PT/INR - ( 30 Aug 2017 14:40 )   PT: 13.0 sec;   INR: 1.18 ratio         PTT - ( 30 Aug 2017 07:46 )  PTT:52.6 sec  Urinalysis Basic - ( 30 Aug 2017 07:54 )    Color: Yellow / Appearance: Clear / S.025 / pH: x  Gluc: x / Ketone: Negative  / Bili: Negative / Urobili: Negative mg/dL   Blood: x / Protein: Negative mg/dL / Nitrite: Negative   Leuk Esterase: Negative / RBC: 6-10 /HPF / WBC 0-2   Sq Epi: x / Non Sq Epi: x / Bacteria: x        RADIOLOGY & ADDITIONAL STUDIES:    INTERPRETATION OF TELEMETRY (personally reviewed): 3 beats NSVT     ECG: NS @ 98 no acute ischemic changes    ECHO: < from: TTE Echo Complete w/Doppler (17 @ 16:22) >  Summary:   1. Left ventricular ejection fraction, by visual estimation, is 40%.   2. Technically difficult study.   3. Mildly to moderately decreased global left ventricular systolic   function.   4. Apical septal segment, apical lateral segment, and apex are abnormal   as described above.   5. Abnormal septal motion consistent with left bundle branch block.   6. Spectral Doppler shows impaired relaxation pattern of left   ventricular myocardial filling (Grade I diastolic dysfunction).   7. LV was imaged on ionotropic support.   8. Severely enlarged right ventricle.   9. Moderately reduced RV systolic function.  10. Due to Rv dilatation TR jet may underestimate pulmonary pressure.  11. Mildly dilated right atrium.  12. There is no evidence of pericardial effusion.  13. Mild mitral annular calcification.  14. Mild mitral valve regurgitation.  15. Thickening of the anterior and posterior mitral valve leaflets.  16. Sclerotic aortic valve with normal opening.  17. Endocardial visualization was enhanced with intravenous echo contrast.     MD Audrey Electronically signed on 2017 at 5:29:29 PM    < end of copied text >           Assessment and Plan:  In summary, KIMMY GRANT is an 57y Male with past medical history significant for HTN, HLD, nonobstructive CAD on cath , SPECT 1/15: no evidence of ischemia, past tobacco use, ECHO 1/15:EF 50-55% no significant valvular dz admitted after taking suboxone x 1 dose and then was found unresponsive on his back for possibly several hours and agonal breathing.  He was intubated and successfully extubated.  He originally required levo, ministerio and was persistently hypotensive until dobutamine was started.  He was subsequently weaned off levo and ministerio but requires dobutamine.    shock    1) Newly reduced EF etiology unclear (possibly related to being critically ill but will need cardiac cath prior to discharge)  2) Agree with ASA  3) Wean from dobutamine as tolerated   4) Will also repeat echo in a few days to assesss EF    EKG NS @ 89 nonspecific T wave flattening, poor R wave progression

## 2017-08-31 NOTE — PROGRESS NOTE ADULT - SUBJECTIVE AND OBJECTIVE BOX
Patient is a 57y old  Male who presents with a chief complaint of Unresponsive (30 Aug 2017 11:13)      BRIEF HOSPITAL COURSE: 58 yo male, PMHx HTN, HLD, chronic back pain, BIBA after being found in bed, unresponsive with agonal respirations. Likely Suboxone induced.   MSOF septic/cardiogenic shock, Hypoxemic resp failure due  aspiration pneumonitis, NAM,  rhabdo, lactic acidosis .  Shock liver ?    Events last 24 hours: weaned FIO2, off pressors, remains on Dobutrex, SBT this morning successful extubation    PAST MEDICAL & SURGICAL HISTORY:  Chronic back pain  High cholesterol  Essential hypertension  No significant past surgical history      Review of Systems:  c/o back pain and       Medications:  piperacillin/tazobactam IVPB. 3.375 Gram(s) IV Intermittent every 8 hours  vancomycin  IVPB 750 milliGRAM(s) IV Intermittent every 12 hours    DOBUTamine Infusion 2.5 MICROgram(s)/kG/Min IV Continuous <Continuous>      acetaminophen    Suspension 650 milliGRAM(s) Oral every 6 hours PRN  ondansetron Injectable 4 milliGRAM(s) IV Push every 6 hours PRN      enoxaparin Injectable 40 milliGRAM(s) SubCutaneous daily    pantoprazole  Injectable 40 milliGRAM(s) IV Push daily      hydrocortisone sodium succinate Injectable 50 milliGRAM(s) IV Push every 6 hours    thiamine IVPB 200 milliGRAM(s) IV Intermittent every 12 hours        Ascorbic Acid in 160mL NS 1500 milliGRAM(s) 160 milliLiter(s) IV Intermittent every 6 hours  acetylcysteine IVPB 4.1 Gram(s) IV Intermittent once  acetylcysteine IVPB 8 Gram(s) IV Intermittent once      Mode: CPAP with PS  FiO2: 30  PEEP: 5  PS: 10  MAP: 9  PIP: 16      ICU Vital Signs Last 24 Hrs  T(C): 36.4 (31 Aug 2017 12:00), Max: 39.4 (30 Aug 2017 18:45)  T(F): 97.5 (31 Aug 2017 12:00), Max: 102.9 (30 Aug 2017 18:45)  HR: 83 (31 Aug 2017 09:00) (81 - 118)  BP: 84/64 (30 Aug 2017 14:30) (84/64 - 84/64)  BP(mean): 70 (30 Aug 2017 14:30) (70 - 70)  ABP: 116/76 (31 Aug 2017 09:00) (18/18 - 134/82)  ABP(mean): 91 (31 Aug 2017 09:00) (18 - 111)  RR: 34 (31 Aug 2017 09:00) (22 - 37)  SpO2: 100% (31 Aug 2017 09:00) (94% - 100%)      ABG - ( 31 Aug 2017 02:43 )  pH: 7.42  /  pCO2: 36    /  pO2: 84    / HCO3: 24    / Base Excess: -1.0  /  SaO2: 98                  I&O's Detail    30 Aug 2017 07:01  -  31 Aug 2017 07:00  --------------------------------------------------------  IN:    dexmedetomidine Infusion: 260.6 mL    DOBUTamine Infusion: 170.9 mL    DOBUTamine Infusion: 12.2 mL    multiple electrolytes Injection Type 1multiple electrolytes Injection Type 1: 500 mL    multiple electrolytes Injection Type 1multiple electrolytes Injection Type 1: 500 mL    norepinephrine Infusion: 885.9 mL    sodium bicarbonate  Infusion: 600 mL    Solution: 50 mL    Solution: 250 mL    Solution: 150 mL    Solution: 580 mL    Solution: 300 mL    Solution: 100 mL    Solution: 200 mL    vasopressin Infusion: 9.6 mL  Total IN: 4569.2 mL    OUT:    Indwelling Catheter - Urethral: 1440 mL  Total OUT: 1440 mL    Total NET: 3129.2 mL      31 Aug 2017 07:01  -  31 Aug 2017 14:06  --------------------------------------------------------  IN:    DOBUTamine Infusion: 12.2 mL    Solution: 160 mL  Total IN: 172.2 mL    OUT:    Indwelling Catheter - Urethral: 110 mL  Total OUT: 110 mL    Total NET: 62.2 mL            LABS:                        13.2   9.1   )-----------( 177      ( 31 Aug 2017 05:48 )             38.8     -    143  |  107  |  34.0<H>  ----------------------------<  130<H>  3.7   |  22.0  |  1.48<H>    Ca    7.7<L>      31 Aug 2017 05:48  Phos  2.8       Mg     2.2     -    TPro  5.2<L>  /  Alb  3.0<L>  /  TBili  0.7  /  DBili  x   /  AST  790<H>  /  ALT  1019<H>  /  AlkPhos  60  0831      CARDIAC MARKERS ( 31 Aug 2017 05:48 )  x     / 0.38 ng/mL / 1766 U/L / x     / 62.2 ng/mL  CARDIAC MARKERS ( 30 Aug 2017 22:06 )  x     / 0.44 ng/mL / 2462 U/L / x     / 101.0 ng/mL  CARDIAC MARKERS ( 30 Aug 2017 20:15 )  x     / x     / 2569 U/L / x     / 112.6 ng/mL  CARDIAC MARKERS ( 30 Aug 2017 10:43 )  x     / x     / 765 U/L / x     / 46.3 ng/mL  CARDIAC MARKERS ( 30 Aug 2017 07:46 )  x     / 0.04 ng/mL / x     / x     / x          CAPILLARY BLOOD GLUCOSE  135 (31 Aug 2017 06:00)        PT/INR - ( 30 Aug 2017 14:40 )   PT: 13.0 sec;   INR: 1.18 ratio         PTT - ( 30 Aug 2017 07:46 )  PTT:52.6 sec  Urinalysis Basic - ( 30 Aug 2017 07:54 )    Color: Yellow / Appearance: Clear / S.025 / pH: x  Gluc: x / Ketone: Negative  / Bili: Negative / Urobili: Negative mg/dL   Blood: x / Protein: Negative mg/dL / Nitrite: Negative   Leuk Esterase: Negative / RBC: 6-10 /HPF / WBC 0-2   Sq Epi: x / Non Sq Epi: x / Bacteria: x      CULTURES:      Physical Examination:    General: No acute distress.      HEENT: Pupils equal, reactive to light.  Symmetric.    PULM: Clear to auscultation bilaterally, no significant sputum production    CVS: Regular rate and rhythm, no murmurs, rubs, or gallops    ABD: Soft, nondistended, nontender, normoactive bowel sounds, no masses    EXT: No edema, nontender    SKIN: Warm and well perfused, no rashes noted.    NEURO: Alert, oriented, interactive, nonfocal    RADIOLOGY: ***    CRITICAL CARE TIME SPENT: *** Patient is a 57y old  Male who presents with a chief complaint of Unresponsive (30 Aug 2017 11:13)      BRIEF HOSPITAL COURSE: 58 yo male, PMHx HTN, HLD, chronic back pain, BIBA after being found in bed, unresponsive with agonal respirations. Likely Suboxone induced.   MSOF septic/cardiogenic shock, Hypoxemic resp failure due  aspiration pneumonitis, NAM,  rhabdo, lactic acidosis .  Shock liver ?    Events last 24 hours: weaned FIO2, off pressors, remains on Dobutrex, SBT this morning successful extubation    PAST MEDICAL & SURGICAL HISTORY:  Chronic back pain  High cholesterol  Essential hypertension  No significant past surgical history      Review of Systems:  C/o abdominal discomfort/nausea/vomiting  denies chest pain, SOB   C/o back pain which is his baseline pain  denies other complaints and is not willing to answer further questions as he reports frustration and wants to go home        Medications:  piperacillin/tazobactam IVPB. 3.375 Gram(s) IV Intermittent every 8 hours  vancomycin  IVPB 750 milliGRAM(s) IV Intermittent every 12 hours    DOBUTamine Infusion 2.5 MICROgram(s)/kG/Min IV Continuous <Continuous>      acetaminophen    Suspension 650 milliGRAM(s) Oral every 6 hours PRN  ondansetron Injectable 4 milliGRAM(s) IV Push every 6 hours PRN      enoxaparin Injectable 40 milliGRAM(s) SubCutaneous daily    pantoprazole  Injectable 40 milliGRAM(s) IV Push daily      hydrocortisone sodium succinate Injectable 50 milliGRAM(s) IV Push every 6 hours    thiamine IVPB 200 milliGRAM(s) IV Intermittent every 12 hours        Ascorbic Acid in 160mL NS 1500 milliGRAM(s) 160 milliLiter(s) IV Intermittent every 6 hours  acetylcysteine IVPB 4.1 Gram(s) IV Intermittent once  acetylcysteine IVPB 8 Gram(s) IV Intermittent once      Mode: CPAP with PS  FiO2: 30  PEEP: 5  PS: 10  MAP: 9  PIP: 16      ICU Vital Signs Last 24 Hrs  T(C): 36.4 (31 Aug 2017 12:00), Max: 39.4 (30 Aug 2017 18:45)  T(F): 97.5 (31 Aug 2017 12:00), Max: 102.9 (30 Aug 2017 18:45)  HR: 83 (31 Aug 2017 09:00) (81 - 118)  BP: 84/64 (30 Aug 2017 14:30) (84/64 - 84/64)  BP(mean): 70 (30 Aug 2017 14:30) (70 - 70)  ABP: 116/76 (31 Aug 2017 09:00) (18/18 - 134/82)  ABP(mean): 91 (31 Aug 2017 09:00) (18 - 111)  RR: 34 (31 Aug 2017 09:00) (22 - 37)  SpO2: 100% (31 Aug 2017 09:00) (94% - 100%)      ABG - ( 31 Aug 2017 02:43 )  pH: 7.42  /  pCO2: 36    /  pO2: 84    / HCO3: 24    / Base Excess: -1.0  /  SaO2: 98                  I&O's Detail    30 Aug 2017 07:01  -  31 Aug 2017 07:00  --------------------------------------------------------  IN:    dexmedetomidine Infusion: 260.6 mL    DOBUTamine Infusion: 170.9 mL    DOBUTamine Infusion: 12.2 mL    multiple electrolytes Injection Type 1multiple electrolytes Injection Type 1: 500 mL    multiple electrolytes Injection Type 1multiple electrolytes Injection Type 1: 500 mL    norepinephrine Infusion: 885.9 mL    sodium bicarbonate  Infusion: 600 mL    Solution: 50 mL    Solution: 250 mL    Solution: 150 mL    Solution: 580 mL    Solution: 300 mL    Solution: 100 mL    Solution: 200 mL    vasopressin Infusion: 9.6 mL  Total IN: 4569.2 mL    OUT:    Indwelling Catheter - Urethral: 1440 mL  Total OUT: 1440 mL    Total NET: 3129.2 mL      31 Aug 2017 07:01  -  31 Aug 2017 14:06  --------------------------------------------------------  IN:    DOBUTamine Infusion: 12.2 mL    Solution: 160 mL  Total IN: 172.2 mL    OUT:    Indwelling Catheter - Urethral: 110 mL  Total OUT: 110 mL    Total NET: 62.2 mL            LABS:                        13.2   9.1   )-----------( 177      ( 31 Aug 2017 05:48 )             38.8     08-31    143  |  107  |  34.0<H>  ----------------------------<  130<H>  3.7   |  22.0  |  1.48<H>    Ca    7.7<L>      31 Aug 2017 05:48  Phos  2.8       Mg     2.2         TPro  5.2<L>  /  Alb  3.0<L>  /  TBili  0.7  /  DBili  x   /  AST  790<H>  /  ALT  1019<H>  /  AlkPhos  60  0831      CARDIAC MARKERS ( 31 Aug 2017 05:48 )  x     / 0.38 ng/mL / 1766 U/L / x     / 62.2 ng/mL  CARDIAC MARKERS ( 30 Aug 2017 22:06 )  x     / 0.44 ng/mL / 2462 U/L / x     / 101.0 ng/mL  CARDIAC MARKERS ( 30 Aug 2017 20:15 )  x     / x     / 2569 U/L / x     / 112.6 ng/mL  CARDIAC MARKERS ( 30 Aug 2017 10:43 )  x     / x     / 765 U/L / x     / 46.3 ng/mL  CARDIAC MARKERS ( 30 Aug 2017 07:46 )  x     / 0.04 ng/mL / x     / x     / x          CAPILLARY BLOOD GLUCOSE  135 (31 Aug 2017 06:00)        PT/INR - ( 30 Aug 2017 14:40 )   PT: 13.0 sec;   INR: 1.18 ratio         PTT - ( 30 Aug 2017 07:46 )  PTT:52.6 sec  Urinalysis Basic - ( 30 Aug 2017 07:54 )    Color: Yellow / Appearance: Clear / S.025 / pH: x  Gluc: x / Ketone: Negative  / Bili: Negative / Urobili: Negative mg/dL   Blood: x / Protein: Negative mg/dL / Nitrite: Negative   Leuk Esterase: Negative / RBC: 6-10 /HPF / WBC 0-2   Sq Epi: x / Non Sq Epi: x / Bacteria: x      CULTURES:  Culture - Sputum . (17 @ 13:40)    Gram Stain:   Numerous White blood cells  Numerous Gram Positive Cocci in Pairs and Chains  Numerous Gram Positive Cocci in Clusters    Specimen Source: .Sputum Sputum    Culture Results:   Moderate Gram Negative Rods Identification and susceptibility to follow.  Moderate Routine respiratory evelyn present  Culture in progress        Physical Examination:    General: No acute distress.      HEENT: Pupils equal, reactive to light.  Symmetric.    PULM: Clear to auscultation bilaterally, no significant sputum production    CVS: tachycardia, no appreciable murmur    ABD: Softly distended, nontender, normoactive bowel sounds, no masses    EXT: anasarca    SKIN: Warm and well perfused, no rashes noted.    NEURO: Alert, oriented, interactive, nonfocal    RADIOLOGY:   < from: Xray Chest 1 View AP/PA. (17 @ 07:27) >    FINDINGS:    Single frontal view of the chest demonstrates mild CHF. Line and tubes   are unchanged. The cardiomediastinal silhouette is enlarged. No acute   osseous abnormalities. Overlying EKG leads and wires are noted.    IMPRESSION: Improved CHF.    < end of copied text >  < from: TTE Echo Complete w/Doppler (17 @ 16:22) >   Summary:   1. Left ventricular ejection fraction, by visual estimation, is 40%.   2. Technically difficult study.   3. Mildly to moderately decreased global left ventricular systolic   function.   4. Apical septal segment, apical lateral segment, and apex are abnormal   as described above.   5. Abnormal septal motion consistent with left bundle branch block.   6. Spectral Doppler shows impaired relaxation pattern of left   ventricular myocardial filling (Grade I diastolic dysfunction).   7. LV was imaged on ionotropic support.   8. Severely enlarged right ventricle.   9. Moderately reduced RV systolic function.  10. Due to Rv dilatation TR jet may underestimate pulmonary pressure.  11. Mildly dilated right atrium.  12. There is no evidence of pericardial effusion.  13. Mild mitral annular calcification.  14. Mild mitral valve regurgitation.  15. Thickening of the anterior and posterior mitral valve leaflets.  16. Sclerotic aortic valve with normal opening.  17. Endocardial visualization was enhanced with intravenous echo contrast.    < end of copied text >    CRITICAL CARE TIME SPENT: 60

## 2017-08-31 NOTE — PROGRESS NOTE ADULT - SUBJECTIVE AND OBJECTIVE BOX
Patient is a 57y old  Male who presents with a chief complaint of Unresponsive (30 Aug 2017 11:13)    PAST MEDICAL & SURGICAL HISTORY:  Chronic back pain  High cholesterol  Essential hypertension  No significant past surgical history    KIMMY GRANT   57y    Male    BRIEF HOSPITAL COURSE:    56 yo male, PMHx HTN, HLD, chronic back pain, BIBA after being found in bed, unresponsive with agonal respirations by his wife this morning. Per pt's wife, pt usually takes 1 Percocet to sleep at night but ran out of his medication 3 days ago, and has not slept in 3 days. Went to [facility] Dr. Singh yesterday for "sleeping pill" and was given Suboxone 8mg-2mg SL film. Pt took 1 film and went to sleep at ~1900, was found by wife next AM at 0600 and she was unable to arouse him. Pt was found with agonal respirations and incontinent of urine. Pt was emergently intubated in ED for hypoxemic respiratory failure. Was treated with 4mg IVP Narcan without improvement in mental status. CT head performed- negative for acute CVA or hemorrhage. CTA chest / abd / pelvis was performed for low Hgb, negative for acute hemorrhage. Found to be hypotensive and hypothermic, initial lactate 3.9, treated with 4L NS IV fluid and started on Zosyn. Also with metabolic acidosis, treated sodium bicarbonate, and electrolyte derangements replaced. Per family, patient was in usual state of health yesterday without any complaints. Denied complaints of chest pain, shortness of breath, abdominal pain, fever, cough, vomiting, or diarrhea.    MSOF septic cardiogenic shock all improving now         Review of Systems:                       All other ROS are negative.    ICU Vital Signs Last 24 Hrs  T(C): 38.3 (31 Aug 2017 00:06), Max: 39.4 (30 Aug 2017 18:45)  T(F): 100.9 (31 Aug 2017 00:06), Max: 102.9 (30 Aug 2017 18:45)  HR: 103 (31 Aug 2017 01:15) (78 - 118)  BP: 84/64 (30 Aug 2017 14:30) (69/45 - 135/82)  BP(mean): 70 (30 Aug 2017 14:30) (51 - 101)  ABP: 119/69 (31 Aug 2017 01:15) (80/56 - 134/82)  ABP(mean): 85 (31 Aug 2017 01:15) (63 - 111)  RR: 22 (31 Aug 2017 01:15) (4 - 46)  SpO2: 95% (31 Aug 2017 01:15) (89% - 100%)    Physical Examination:    General: Arousable on precedex    HEENT: no JVD    PULM: bilateral BS    CVS: s1 s2 reg    ABD: soft NT    EXT: warm    SKIN: multiple tattoo     Neuro: moves 4 ext intermittently follows commands    ABG - ( 30 Aug 2017 20:38 )  pH: 7.35  /  pCO2: 37    /  pO2: 76    / HCO3: 20    / Base Excess: -4.9  /  SaO2: 96                Mode: AC/ CMV (Assist Control/ Continuous Mandatory Ventilation)  RR (machine): 24  TV (machine): 460  FiO2: 30  PEEP: 5  MAP: 11  PIP: 24    Mode: AC/ CMV (Assist Control/ Continuous Mandatory Ventilation), RR (machine): 24, TV (machine): 460, FiO2: 30, PEEP: 5, MAP: 11, PIP: 24  LABS:                        15.1   15.0  )-----------( 333      ( 30 Aug 2017 14:40 )             45.5     08-30    143  |  105  |  33.0<H>  ----------------------------<  155<H>  4.6   |  20.0<L>  |  1.97<H>    Ca    7.8<L>      30 Aug 2017 20:15  Phos  1.5     08-30  Mg     1.8     08-30    TPro  5.6<L>  /  Alb  3.1<L>  /  TBili  0.7  /  DBili  x   /  AST  495<H>  /  ALT  754<H>  /  AlkPhos  68  08-30      CARDIAC MARKERS ( 30 Aug 2017 22:06 )  x     / 0.44 ng/mL / 2462 U/L / x     / 101.0 ng/mL  CARDIAC MARKERS ( 30 Aug 2017 20:15 )  x     / x     / 2569 U/L / x     / 112.6 ng/mL  CARDIAC MARKERS ( 30 Aug 2017 10:43 )  x     / x     / 765 U/L / x     / 46.3 ng/mL  CARDIAC MARKERS ( 30 Aug 2017 07:46 )  x     / 0.04 ng/mL / x     / x     / x          CAPILLARY BLOOD GLUCOSE  116 (30 Aug 2017 22:00)      PT/INR - ( 30 Aug 2017 14:40 )   PT: 13.0 sec;   INR: 1.18 ratio         PTT - ( 30 Aug 2017 07:46 )  PTT:52.6 sec  Urinalysis Basic - ( 30 Aug 2017 07:54 )    Color: Yellow / Appearance: Clear / S.025 / pH: x  Gluc: x / Ketone: Negative  / Bili: Negative / Urobili: Negative mg/dL   Blood: x / Protein: Negative mg/dL / Nitrite: Negative   Leuk Esterase: Negative / RBC: 6-10 /HPF / WBC 0-2   Sq Epi: x / Non Sq Epi: x / Bacteria: x      CULTURES: sputum GPC      Medications:  piperacillin/tazobactam IVPB. 3.375 Gram(s) IV Intermittent every 8 hours  azithromycin  IVPB 500 milliGRAM(s) IV Intermittent every 24 hours  azithromycin  IVPB   IV Intermittent   vancomycin  IVPB 750 milliGRAM(s) IV Intermittent every 12 hours  norepinephrine Infusion 0.05 MICROgram(s)/kG/Min IV Continuous <Continuous>  DOBUTamine Infusion 5 MICROgram(s)/kG/Min IV Continuous <Continuous>  dexmedetomidine Infusion 0.7 MICROgram(s)/kG/Hr IV Continuous <Continuous>  acetaminophen    Suspension 650 milliGRAM(s) Oral every 6 hours PRN  enoxaparin Injectable 40 milliGRAM(s) SubCutaneous daily  pantoprazole  Injectable 40 milliGRAM(s) IV Push daily  hydrocortisone sodium succinate Injectable 50 milliGRAM(s) IV Push every 6 hours  vasopressin Infusion 0.04 Unit(s)/Min IV Continuous <Continuous>  insulin lispro (HumaLOG) corrective regimen sliding scale   SubCutaneous every 4 hours  dextrose Gel 1 Dose(s) Oral once PRN  dextrose 50% Injectable 12.5 Gram(s) IV Push once  dextrose 50% Injectable 25 Gram(s) IV Push once  dextrose 50% Injectable 25 Gram(s) IV Push once  glucagon  Injectable 1 milliGRAM(s) IntraMuscular once PRN  thiamine IVPB 200 milliGRAM(s) IV Intermittent every 12 hours  dextrose 5%. 1000 milliLiter(s) IV Continuous <Continuous>  multiple electrolytes Injection Type 1 1000 milliLiter(s) IV Continuous <Continuous>  chlorhexidine 0.12% Liquid 15 milliLiter(s) Swish and Spit two times a day  Ascorbic Acid in 160mL NS 1500 milliGRAM(s) 160 milliLiter(s) IV Intermittent every 6 hours      08-30 @ 07:01  -   @ 02:10  --------------------------------------------------------  IN: 3532.9 mL / OUT: 1130 mL / NET: 2402.9 mL        RADIOLOGY/IMAGING/ECHO    Echo    1. Left ventricular ejection fraction, by visual estimation, is 40%.   2. Technically difficult study.   3. Mildly to moderately decreased global left ventricular systolic   function.   4. Apical septal segment, apical lateral segment, and apex are abnormal   as described above.   5. Abnormal septal motion consistent with left bundle branch block.   6. Spectral Doppler shows impaired relaxation pattern of left   ventricular myocardial filling (Grade I diastolic dysfunction).   7. LV was imaged on ionotropic support.   8. Severely enlarged right ventricle.   9. Moderately reduced RV systolic function.  10. Due to Rv dilatation TR jet may underestimate pulmonary pressure.  11. Mildly dilated right atrium.  12. There is no evidence of pericardial effusion.  13. Mild mitral annular calcification.  14. Mild mitral valve regurgitation.  15. Thickening of the anterior and posterior mitral valve leaflets.  16. Sclerotic aortic valve with normal opening.  17. Endocardial visualization was enhanced with intravenous echo contrast.        CTA Chest     No acute aortic findings.    Bilateral lung consolidations, correlate for atelectasis/aspiration.    No acute findings abdomen pelvis.    Incidental comments with recommendations as above.        CT Head N  No acute intracranial pathology.      Doppler  LE    No DVT        Critical care point of care ultrasound:  POCUS   LVEF appears normal now on dobutamine.          Assessment/Plan:    56 yo male, PMHx HTN, HLD, chronic back pain, BIBA after being found in bed, unresponsive with agonal respirations. Likely Suboxone induced.   MSOF septic/cardiogenic shock, Hypoxemic VDRF due  aspiration pneumonitis NAM   rhabdo lactic acidosis .  Shock liver ?? anoxic injury.    All endpoints of resus are improving.       Neuro:  Low dose precedex hold all other sedation.  ?? of anoxic injury, if so mild by current neuro exam    Cor:  Echo biventricular dysfx on dobutamine 5 mics low dose levo titrate to MAP 70.  Vaso stopped .  End points improving CI on flow trac 3  POCUS shows normal LV fx.     Pulm:  rapid improvement in hypoxemia  fi02 titrated to 30% peep down to 5    GI: hold feeds mal be extubateable   add PPI  transaminases elevated due to  ischemic hepatopathy.  Trend.  Will hold macrolide doubt atypical PNA.  Follow legionella  Ag    Renal: non-oliguric NAM also improving acidosis clearing bicarb drip d/c  CPK 2500 rhabdo component along with IV contrast and hypotensive ATN  No indication for HD K+ down.     Heme:   DVT prophylaxis    ID broad coverage  high PCT likely from aspiration pneumonitis Gram + in sputum   Vanco adjust to creat cl.  Zosyluis  MARIK protocol (seems to be helping)     Endo:  Add insulin coverage to cover stree/steroid induced hyperglycemia.         Minutes of Critical Care time: 105  (Reviewing data, imaging, discussing with multidisciplinary team, non inclusive of procedures, discussing goals of care with patient/family)

## 2017-08-31 NOTE — PROGRESS NOTE ADULT - PROBLEM SELECTOR PLAN 1
Possible aspiration, increasing oxygen req since admitted this morning, PEEP adjusted now showing improved PaO2.   Gram stain shows gram positives- Pt on Zosyn, Vanco, Azithro   f/u ID and sens of culture probable aspiration  extubated this morning  CXR improving

## 2017-08-31 NOTE — PROGRESS NOTE ADULT - PROBLEM SELECTOR PLAN 3
echo as above, poor EF with large weak RV, no PE on CTA, likely cardiogenic component of septic shock  Flotrac attached to patient which initially showed CI 1.2, prior to echo Dobutamine empirically added at 5mcg and CI increased to >2 Dobutrex cut to 2.5mcg this morning and now turned off, CI remains >2.5 by FLotrac  cardiology eval appreciated

## 2017-08-31 NOTE — PROGRESS NOTE ADULT - PROBLEM SELECTOR PLAN 7
likely ATN, first 's will repeat r/o rhabdo, Renal consulted for possible CCVHDF likely ATN, CK's Improving continue close monitoring of UOP, lytes

## 2017-08-31 NOTE — PROGRESS NOTE ADULT - PROBLEM SELECTOR PLAN 2
increasing pressor req added Vasopressin in addition to Levophed, Marik sepsis protocol  bedside critical care echo assessment shows poor EF, official echo done and reported as above weaned off pressors overnight, remains on antibiotics for aspiration  sputum culture is showing GNR await speciation and sensitivity will de escalate antibiotics as able. d/c vanco, azithro d/c for liver function abnormalities

## 2017-08-31 NOTE — PROGRESS NOTE ADULT - PROBLEM SELECTOR PLAN 6
poor UOP, urine osm normal, lactic acid remains high with low pH- started on Bicarb infusion   K elevated at >6 will treat w kayexalate, Ca++ already given resolving, UOP and creat have improved

## 2017-08-31 NOTE — PROGRESS NOTE ADULT - PROBLEM SELECTOR PLAN 4
multifactorial will re- eval neuros off sedation periodically  may entertain EEG in the next 24h, prolactin level sent out  all other toxic ingestions ruled out thus far awake and extubated, oriented, no apparent deficit

## 2017-08-31 NOTE — PROGRESS NOTE ADULT - SUBJECTIVE AND OBJECTIVE BOX
NEPHROLOGY INTERVAL HPI/OVERNIGHT EVENTS:  pt doing well  extubated when seen earlier==> no acute distress    MEDICATIONS  (STANDING):  pantoprazole  Injectable 40 milliGRAM(s) IV Push daily  enoxaparin Injectable 40 milliGRAM(s) SubCutaneous daily  piperacillin/tazobactam IVPB. 3.375 Gram(s) IV Intermittent every 8 hours  hydrocortisone sodium succinate Injectable 50 milliGRAM(s) IV Push every 6 hours  Ascorbic Acid in 160mL NS 1500 milliGRAM(s) 160 milliLiter(s) IV Intermittent every 6 hours  thiamine IVPB 200 milliGRAM(s) IV Intermittent every 12 hours  vancomycin  IVPB 750 milliGRAM(s) IV Intermittent every 12 hours  acetylcysteine IVPB 4.1 Gram(s) IV Intermittent once  acetylcysteine IVPB 8 Gram(s) IV Intermittent once  DOBUTamine Infusion 2.5 MICROgram(s)/kG/Min (6.12 mL/Hr) IV Continuous <Continuous>    MEDICATIONS  (PRN):  acetaminophen    Suspension 650 milliGRAM(s) Oral every 6 hours PRN For Temp greater than 38.5 C (101.3 F)  ondansetron Injectable 4 milliGRAM(s) IV Push every 6 hours PRN Nausea and/or Vomiting      Allergies    No Known Allergies    Intolerances            Vital Signs Last 24 Hrs  T(C): 36.4 (31 Aug 2017 12:00), Max: 39.4 (30 Aug 2017 18:45)  T(F): 97.5 (31 Aug 2017 12:00), Max: 102.9 (30 Aug 2017 18:45)  HR: 83 (31 Aug 2017 09:00) (81 - 118)  BP: --  BP(mean): --  RR: 34 (31 Aug 2017 09:00) (22 - 37)  SpO2: 100% (31 Aug 2017 09:00) (94% - 100%)    PHYSICAL EXAM:  GENERAL: weakness  NECK: Supple, No JVD  NERVOUS SYSTEM:  AAO x 3; non focal  CHEST/LUNG: Clear to percussion bilaterally; No rales, rhonchi, wheezing, or rubs  HEART: Regular rate and rhythm; No rub  ABDOMEN: Soft, Nontender, Nondistended; Bowel sounds present  EXTREMITIES:  2+ Peripheral Pulses, No clubbing, cyanosis, or edema  SKIN: No rashes or lesions    LABS:                        13.2   9.1   )-----------( 177      ( 31 Aug 2017 05:48 )             38.8         143  |  107  |  34.0<H>  ----------------------------<  130<H>  3.7   |  22.0  |  1.48<H>    Ca    7.7<L>      31 Aug 2017 05:48  Phos  2.8       Mg     2.2         TPro  5.2<L>  /  Alb  3.0<L>  /  TBili  0.7  /  DBili  x   /  AST  790<H>  /  ALT  1019<H>  /  AlkPhos  60      PT/INR - ( 30 Aug 2017 14:40 )   PT: 13.0 sec;   INR: 1.18 ratio         PTT - ( 30 Aug 2017 07:46 )  PTT:52.6 sec  Urinalysis Basic - ( 30 Aug 2017 07:54 )    Color: Yellow / Appearance: Clear / S.025 / pH: x  Gluc: x / Ketone: Negative  / Bili: Negative / Urobili: Negative mg/dL   Blood: x / Protein: Negative mg/dL / Nitrite: Negative   Leuk Esterase: Negative / RBC: 6-10 /HPF / WBC 0-2   Sq Epi: x / Non Sq Epi: x / Bacteria: x      Magnesium, Serum: 2.2 mg/dL ( @ 05:48)  Phosphorus Level, Serum: 2.8 mg/dL ( @ 05:48)  Magnesium, Serum: 1.8 mg/dL ( @ 20:15)  Phosphorus Level, Serum: 1.5 mg/dL ( @ 20:15)      RADIOLOGY & ADDITIONAL TESTS:

## 2017-09-01 DIAGNOSIS — A41.9 SEPSIS, UNSPECIFIED ORGANISM: ICD-10-CM

## 2017-09-01 LAB
-  AMIKACIN: SIGNIFICANT CHANGE UP
-  AMPICILLIN/SULBACTAM: SIGNIFICANT CHANGE UP
-  AMPICILLIN: SIGNIFICANT CHANGE UP
-  AZTREONAM: SIGNIFICANT CHANGE UP
-  CEFAZOLIN: SIGNIFICANT CHANGE UP
-  CEFEPIME: SIGNIFICANT CHANGE UP
-  CEFOXITIN: SIGNIFICANT CHANGE UP
-  CEFTAZIDIME: SIGNIFICANT CHANGE UP
-  CEFTRIAXONE: SIGNIFICANT CHANGE UP
-  CIPROFLOXACIN: SIGNIFICANT CHANGE UP
-  ERTAPENEM: SIGNIFICANT CHANGE UP
-  GENTAMICIN: SIGNIFICANT CHANGE UP
-  IMIPENEM: SIGNIFICANT CHANGE UP
-  LEVOFLOXACIN: SIGNIFICANT CHANGE UP
-  MEROPENEM: SIGNIFICANT CHANGE UP
-  PIPERACILLIN/TAZOBACTAM: SIGNIFICANT CHANGE UP
-  TOBRAMYCIN: SIGNIFICANT CHANGE UP
-  TRIMETHOPRIM/SULFAMETHOXAZOLE: SIGNIFICANT CHANGE UP
ALBUMIN SERPL ELPH-MCNC: 3.2 G/DL — LOW (ref 3.3–5.2)
ALP SERPL-CCNC: 62 U/L — SIGNIFICANT CHANGE UP (ref 40–120)
ALT FLD-CCNC: 1905 U/L — HIGH
ANION GAP SERPL CALC-SCNC: 14 MMOL/L — SIGNIFICANT CHANGE UP (ref 5–17)
AST SERPL-CCNC: 922 U/L — HIGH
BILIRUB SERPL-MCNC: 0.6 MG/DL — SIGNIFICANT CHANGE UP (ref 0.4–2)
BUN SERPL-MCNC: 24 MG/DL — HIGH (ref 8–20)
CALCIUM SERPL-MCNC: 8.2 MG/DL — LOW (ref 8.6–10.2)
CHLORIDE SERPL-SCNC: 105 MMOL/L — SIGNIFICANT CHANGE UP (ref 98–107)
CK MB CFR SERPL CALC: 25.9 NG/ML — HIGH (ref 0–6.7)
CK SERPL-CCNC: 1004 U/L — HIGH (ref 30–200)
CO2 SERPL-SCNC: 26 MMOL/L — SIGNIFICANT CHANGE UP (ref 22–29)
CREAT SERPL-MCNC: 0.86 MG/DL — SIGNIFICANT CHANGE UP (ref 0.5–1.3)
CULTURE RESULTS: SIGNIFICANT CHANGE UP
GLUCOSE SERPL-MCNC: 122 MG/DL — HIGH (ref 70–115)
LACTATE SERPL-SCNC: 1.2 MMOL/L — SIGNIFICANT CHANGE UP (ref 0.5–2)
METHOD TYPE: SIGNIFICANT CHANGE UP
ORGANISM # SPEC MICROSCOPIC CNT: SIGNIFICANT CHANGE UP
ORGANISM # SPEC MICROSCOPIC CNT: SIGNIFICANT CHANGE UP
POTASSIUM SERPL-MCNC: 3.1 MMOL/L — LOW (ref 3.5–5.3)
POTASSIUM SERPL-SCNC: 3.1 MMOL/L — LOW (ref 3.5–5.3)
PROT SERPL-MCNC: 5.6 G/DL — LOW (ref 6.6–8.7)
SODIUM SERPL-SCNC: 145 MMOL/L — SIGNIFICANT CHANGE UP (ref 135–145)
SPECIMEN SOURCE: SIGNIFICANT CHANGE UP

## 2017-09-01 PROCEDURE — 99233 SBSQ HOSP IP/OBS HIGH 50: CPT

## 2017-09-01 RX ORDER — PANTOPRAZOLE SODIUM 20 MG/1
40 TABLET, DELAYED RELEASE ORAL
Qty: 0 | Refills: 0 | Status: DISCONTINUED | OUTPATIENT
Start: 2017-09-01 | End: 2017-09-05

## 2017-09-01 RX ORDER — CEFAZOLIN SODIUM 1 G
VIAL (EA) INJECTION
Qty: 0 | Refills: 0 | Status: COMPLETED | OUTPATIENT
Start: 2017-09-01 | End: 2017-09-04

## 2017-09-01 RX ORDER — FUROSEMIDE 40 MG
20 TABLET ORAL ONCE
Qty: 0 | Refills: 0 | Status: DISCONTINUED | OUTPATIENT
Start: 2017-09-01 | End: 2017-09-01

## 2017-09-01 RX ORDER — GABAPENTIN 400 MG/1
300 CAPSULE ORAL
Qty: 0 | Refills: 0 | Status: DISCONTINUED | OUTPATIENT
Start: 2017-09-01 | End: 2017-09-05

## 2017-09-01 RX ORDER — CEFTRIAXONE 500 MG/1
1 INJECTION, POWDER, FOR SOLUTION INTRAMUSCULAR; INTRAVENOUS EVERY 24 HOURS
Qty: 0 | Refills: 0 | Status: DISCONTINUED | OUTPATIENT
Start: 2017-09-01 | End: 2017-09-01

## 2017-09-01 RX ORDER — CEFAZOLIN SODIUM 1 G
2000 VIAL (EA) INJECTION ONCE
Qty: 0 | Refills: 0 | Status: COMPLETED | OUTPATIENT
Start: 2017-09-01 | End: 2017-09-01

## 2017-09-01 RX ORDER — CEFAZOLIN SODIUM 1 G
2000 VIAL (EA) INJECTION EVERY 8 HOURS
Qty: 0 | Refills: 0 | Status: COMPLETED | OUTPATIENT
Start: 2017-09-01 | End: 2017-09-04

## 2017-09-01 RX ORDER — CALCIUM GLUCONATE 100 MG/ML
2 VIAL (ML) INTRAVENOUS ONCE
Qty: 0 | Refills: 0 | Status: COMPLETED | OUTPATIENT
Start: 2017-09-01 | End: 2017-09-01

## 2017-09-01 RX ORDER — METOPROLOL TARTRATE 50 MG
25 TABLET ORAL
Qty: 0 | Refills: 0 | Status: DISCONTINUED | OUTPATIENT
Start: 2017-09-01 | End: 2017-09-05

## 2017-09-01 RX ORDER — FUROSEMIDE 40 MG
20 TABLET ORAL ONCE
Qty: 0 | Refills: 0 | Status: COMPLETED | OUTPATIENT
Start: 2017-09-01 | End: 2017-09-01

## 2017-09-01 RX ORDER — POTASSIUM CHLORIDE 20 MEQ
40 PACKET (EA) ORAL ONCE
Qty: 0 | Refills: 0 | Status: COMPLETED | OUTPATIENT
Start: 2017-09-01 | End: 2017-09-01

## 2017-09-01 RX ADMIN — Medication 50 MILLIGRAM(S): at 06:11

## 2017-09-01 RX ADMIN — Medication 100 MILLIGRAM(S): at 22:09

## 2017-09-01 RX ADMIN — GABAPENTIN 300 MILLIGRAM(S): 400 CAPSULE ORAL at 13:54

## 2017-09-01 RX ADMIN — Medication 100 MILLIGRAM(S): at 11:09

## 2017-09-01 RX ADMIN — Medication 50 MILLIGRAM(S): at 11:45

## 2017-09-01 RX ADMIN — Medication 40 MILLIEQUIVALENT(S): at 12:44

## 2017-09-01 RX ADMIN — PIPERACILLIN AND TAZOBACTAM 25 GRAM(S): 4; .5 INJECTION, POWDER, LYOPHILIZED, FOR SOLUTION INTRAVENOUS at 01:06

## 2017-09-01 RX ADMIN — Medication 50 MILLIGRAM(S): at 01:06

## 2017-09-01 RX ADMIN — Medication 100 MILLIGRAM(S): at 12:25

## 2017-09-01 RX ADMIN — Medication 25 MILLIGRAM(S): at 18:04

## 2017-09-01 RX ADMIN — Medication 20 MILLIGRAM(S): at 11:10

## 2017-09-01 RX ADMIN — Medication 400 GRAM(S): at 11:10

## 2017-09-01 RX ADMIN — Medication 25 MILLIGRAM(S): at 15:51

## 2017-09-01 RX ADMIN — Medication 102 MILLIGRAM(S): at 06:11

## 2017-09-01 RX ADMIN — Medication 50 MILLIGRAM(S): at 18:04

## 2017-09-01 RX ADMIN — PANTOPRAZOLE SODIUM 40 MILLIGRAM(S): 20 TABLET, DELAYED RELEASE ORAL at 11:10

## 2017-09-01 RX ADMIN — ENOXAPARIN SODIUM 40 MILLIGRAM(S): 100 INJECTION SUBCUTANEOUS at 11:45

## 2017-09-01 RX ADMIN — Medication 102 MILLIGRAM(S): at 18:05

## 2017-09-01 NOTE — PROGRESS NOTE ADULT - SUBJECTIVE AND OBJECTIVE BOX
Patient is a 57y old  Male who presents with a chief complaint of Unresponsive (30 Aug 2017 11:13)      BRIEF HOSPITAL COURSE: 56 yo male, PMHx HTN, HLD, chronic back pain, BIBA after being found in bed, unresponsive with agonal respirations by his wife this morning. Per pt's wife, pt usually takes 1 Percocet to sleep at night but ran out of his medication 3 days ago, and has not slept in 3 days. Went to [facility] Dr. Singh yesterday for "sleeping pill" and was given Suboxone 8mg-2mg SL film. Pt took 1 film and went to sleep at ~1900, was found by wife next AM at 0600 and she was unable to arouse him. Pt was found with agonal respirations and incontinent of urine. Pt was emergently intubated in ED for hypoxemic respiratory failure. Was treated with 4mg IVP Narcan without improvement in mental status. CT head performed- negative for acute CVA or hemorrhage. CTA chest / abd / pelvis was performed for low Hgb, negative for acute hemorrhage. Found to be hypotensive and hypothermic, initial lactate 3.9, treated with 4L NS IV fluid and started on Zosyn. Also with metabolic acidosis, treated sodium bicarbonate, and electrolyte derangements replaced. Per family, patient was in usual state of health yesterday without any complaints. Denied complaints of chest pain, shortness of breath, abdominal pain, fever, cough, vomiting, or diarrhea.    Events last 24 hours: Pt was weaned from vent and extubated.  CI/CO remained adequate with weaning and removal of dobutamine.  Currently with continued improvements, however now with diminishing urine output.        PAST MEDICAL & SURGICAL HISTORY:  Chronic back pain  High cholesterol  Essential hypertension  No significant past surgical history      Review of Systems:  CONSTITUTIONAL: No fever, chills, or fatigue  EYES: No eye pain, visual disturbances, or discharge  ENMT:  No difficulty hearing, tinnitus, vertigo; No sinus or throat pain  NECK: No pain or stiffness  RESPIRATORY: + shortness of breath intermittent  CARDIOVASCULAR: No chest pain, palpitations, dizziness, or leg swelling  GASTROINTESTINAL: No abdominal or epigastric pain. No nausea, vomiting, or hematemesis; No diarrhea or constipation. No melena or hematochezia.  GENITOURINARY: No dysuria, frequency, hematuria, or incontinence  NEUROLOGICAL: No headaches, memory loss, loss of strength, numbness, or tremors  SKIN: No itching, burning, rashes, or lesions   MUSCULOSKELETAL: No joint pain or swelling; No muscle, back, or extremity pain  PSYCHIATRIC: No depression, anxiety, mood swings, or difficulty sleeping      Medications:  piperacillin/tazobactam IVPB. 3.375 Gram(s) IV Intermittent every 8 hours        acetaminophen    Suspension 650 milliGRAM(s) Oral every 6 hours PRN  ondansetron Injectable 4 milliGRAM(s) IV Push every 6 hours PRN      enoxaparin Injectable 40 milliGRAM(s) SubCutaneous daily    pantoprazole  Injectable 40 milliGRAM(s) IV Push daily      hydrocortisone sodium succinate Injectable 50 milliGRAM(s) IV Push every 6 hours    thiamine IVPB 200 milliGRAM(s) IV Intermittent every 12 hours        Ascorbic Acid in 160mL NS 1500 milliGRAM(s) 160 milliLiter(s) IV Intermittent every 6 hours      Mode: CPAP with PS PRN  FiO2: 30  PEEP: 5  PS: 10  MAP: 9  PIP: 16      ICU Vital Signs Last 24 Hrs  T(C): 36.9 (01 Sep 2017 00:00), Max: 37.8 (31 Aug 2017 04:00)  T(F): 98.4 (01 Sep 2017 00:00), Max: 100 (31 Aug 2017 04:00)  HR: 82 (01 Sep 2017 02:00) (80 - 101)  BP: 131/88 (01 Sep 2017 02:00) (120/80 - 134/86)  BP(mean): 104 (01 Sep 2017 02:00) (95 - 106)  ABP: 138/79 (31 Aug 2017 19:00) (18/18 - 139/80)  ABP(mean): 101 (31 Aug 2017 19:00) (18 - 102)  RR: 31 (01 Sep 2017 02:00) (22 - 37)  SpO2: 96% (01 Sep 2017 02:00) (95% - 100%)      ABG - ( 31 Aug 2017 02:43 )  pH: 7.42  /  pCO2: 36    /  pO2: 84    / HCO3: 24    / Base Excess: -1.0  /  SaO2: 98                        LABS:                        13.2   9.1   )-----------( 177      ( 31 Aug 2017 05:48 )             38.8         143  |  107  |  34.0<H>  ----------------------------<  130<H>  3.7   |  22.0  |  1.48<H>    Ca    7.7<L>      31 Aug 2017 05:48  Phos  2.8       Mg     2.2         TPro  5.2<L>  /  Alb  3.0<L>  /  TBili  0.7  /  DBili  x   /  AST  790<H>  /  ALT  1019<H>  /  AlkPhos  60  0831      CARDIAC MARKERS ( 31 Aug 2017 05:48 )  x     / 0.38 ng/mL / 1766 U/L / x     / 62.2 ng/mL  CARDIAC MARKERS ( 30 Aug 2017 22:06 )  x     / 0.44 ng/mL / 2462 U/L / x     / 101.0 ng/mL  CARDIAC MARKERS ( 30 Aug 2017 20:15 )  x     / x     / 2569 U/L / x     / 112.6 ng/mL  CARDIAC MARKERS ( 30 Aug 2017 10:43 )  x     / x     / 765 U/L / x     / 46.3 ng/mL  CARDIAC MARKERS ( 30 Aug 2017 07:46 )  x     / 0.04 ng/mL / x     / x     / x          CAPILLARY BLOOD GLUCOSE  120 (31 Aug 2017 12:00)        PT/INR - ( 30 Aug 2017 14:40 )   PT: 13.0 sec;   INR: 1.18 ratio         PTT - ( 30 Aug 2017 07:46 )  PTT:52.6 sec  Urinalysis Basic - ( 30 Aug 2017 07:54 )    Color: Yellow / Appearance: Clear / S.025 / pH: x  Gluc: x / Ketone: Negative  / Bili: Negative / Urobili: Negative mg/dL   Blood: x / Protein: Negative mg/dL / Nitrite: Negative   Leuk Esterase: Negative / RBC: 6-10 /HPF / WBC 0-2   Sq Epi: x / Non Sq Epi: x / Bacteria: x      CULTURES: legionella negative, Sputum culture shows GNR, identification pending      Physical Examination:    General: mild distress.  Alert, oriented, interactive, nonfocal    HEENT: Pupils equal, reactive to light.  No JVD    PULM: Mildly diminished at bilateral bases with few scant inspiratory rales    CVS: Regular rate and rhythm, no murmurs    ABD: Soft, nondistended, nontender, normoactive bowel sounds    EXT: No edema,warm and well perfused    SKIN:  multiple tattoos    RADIOLOGY: CXR : improved CHF    EXAM:  US DPLX LWR EXT VEINS COMPL BI                          PROCEDURE DATE:  2017          INTERPRETATION:  CLINICAL INFORMATION: Unresponsive at home, hypothermic,   now in shock, echo ? PE, patient unable to communicate, assess DVT.    COMPARISON: None available.    TECHNIQUE: Duplex sonography of the BILATERAL LOWER extremities with   color and spectral Doppler, with and without compression.      FINDINGS:    There is normal compressibility of the bilateral common femoral, femoral   and popliteal veins. No calf vein thrombosis is detected.    Doppler examination shows normal spontaneous and phasic flow.    IMPRESSION:     No evidence of bilateral lower extremity deep venous thrombosis.     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      *** END OF ADDENDUM 2017  ***      PROCEDURE DATE:  2017          INTERPRETATION:    CT Angiogram of the chest abdomen pelviswas performed with intravenous   contrast.  100ml of Omnipaque 350 was injected intravenously. None was discarded.   Coronal, Saggital and 3D reformats were submitted.    Clinical information: Unresponsive, mental status change, low hemoglobin,   rule out internal hemorrhage/aortic dissection    Comparison: None    FINDINGS:    VESSELS: No dissection, intramural hematoma or central pulmonary   embolism. Single origin of the SMA and celiac axis and inferior   mesenteric arteries are patent. Extensive atherosclerotic plaque of the   abdominal aorta and pelvic vessels. Renal arteries patent.    INCIDENTAL:    CHEST WALL AND LOWER NECK: ET tube above the peace. NG tube in the   stomach. Heterogeneously nodular thyroid gland, routine thyroid   ultrasound suggest.  MEDIASTINUM AND MO: Within normal limits  HEART: Within normal limits  LUNG AND LARGE AIRWAYS: Airspace consolidations in the lower lobes and   posterior segment right upper lobe, correlate for atelectasis/aspiration.    LIVER: Severe steatosis.  BILE DUCTS: Normal caliber  GALLBLADDER: No calcified gallstones. Normal caliber wall  PANCREAS: within normal limits  SPLEEN: within normal limits  ADRENALS: within normal limits  KIDNEYS: within normal limits    REPRODUCTIVE ORGANS: no pelvic masses  BLADDER: Cali catheter    BOWEL: Sigmoid diverticulosis, otherwise unremarkable.  PERITONEUM: No ascites or free air, no fluid collection    RETROPERITONEUM: No enlarged retroperitoneal or pelvic nodes  ABDOMINAL WALL: Within normal limits  BONES: Within normal limits    IMPRESSION:     No acute aortic findings.    Bilateral lung consolidations, correlate for atelectasis/aspiration.    No acute findings abdomen pelvis.    Incidental comments with recommendations as above.      ***Please see the addendum at the top of this report. It may contain   additional important information or changes.****          GEE SCHAEFFER M.D., ATTENDING RADIOLOGIST  This document has been electronically signed. Aug 30 2017  8:43AM  Addend:  GEE SCHAEFFER M.D., ATTENDING RADIOLOGIST  This addendum was electronically signed on: Aug 30 2017  8:57AM.      Critical Care POCUS: LVEF appears normal off dobutamine grossly        CRITICAL CARE TIME SPENT:

## 2017-09-01 NOTE — PROGRESS NOTE ADULT - PROBLEM SELECTOR PLAN 1
Currently off dobutamine earlier today with good CI as per flotrac 4 hours after dobutamine discontinued.  POCUS grossly appears to still show normal LVfx. Doubt source of low urine output secondary to forward flow.

## 2017-09-01 NOTE — PROGRESS NOTE ADULT - SUBJECTIVE AND OBJECTIVE BOX
Patient seen and examined  OOB/Ch; family present    I&O's Summary    31 Aug 2017 07:01  -  01 Sep 2017 07:00  --------------------------------------------------------  IN: 3453.3 mL / OUT: 1745 mL / NET: 1708.3 mL    01 Sep 2017 07:01  -  01 Sep 2017 13:33  --------------------------------------------------------  IN: 127.5 mL / OUT: 760 mL / NET: -632.5 mL        REVIEW OF SYSTEMS:    CONSTITUTIONAL: No F/C   RESPIRATORY: No cough or SOB  CARDIOVASCULAR: No CP/palpitations,    GASTROINTESTINAL: No abdominal pain , NVD   GENITOURINARY: No UTI sx  NEUROLOGICAL: No headaches/wk/numbness  MUSCULOSKELETAL:  No joint pain/swelling; + LBP  EXTREMITIES : no swelling,    Vital Signs Last 24 Hrs  T(C): 37.3 (01 Sep 2017 07:00), Max: 37.3 (01 Sep 2017 07:00)  T(F): 99.1 (01 Sep 2017 07:00), Max: 99.1 (01 Sep 2017 07:00)  HR: 87 (01 Sep 2017 08:00) (80 - 95)  BP: 151/85 (01 Sep 2017 08:00) (120/80 - 151/85)  BP(mean): 112 (01 Sep 2017 08:00) (95 - 112)  RR: 27 (01 Sep 2017 08:00) (23 - 32)  SpO2: 96% (01 Sep 2017 08:00) (90% - 98%)    PHYSICAL EXAM:    GENERAL: NAD, obese  EYES:  conjunctiva and sclera clear  NECK: Supple, No JVD/Bruit  NERVOUS SYSTEM:  A/O x3,   CHEST:  CTA ,No rales or rhonchi  HEART:  RRR, No murmurs  ABDOMEN: Soft, NT/ND BS+  EXTREMITIES:  mild Edema;  SKIN: No rashes    LABS:                        13.2   9.1   )-----------( 177      ( 31 Aug 2017 05:48 )             38.8     09-01    145  |  105  |  24.0<H>  ----------------------------<  122<H>  3.1<L>   |  26.0  |  0.86    Ca    8.2<L>      01 Sep 2017 11:16  Phos  2.8     08-31  Mg     2.2     08-31    TPro  5.6<L>  /  Alb  3.2<L>  /  TBili  0.6  /  DBili  x   /  AST  922<H>  /  ALT  1905<H>  /  AlkPhos  62  09-01      MEDICATIONS  (STANDING):  enoxaparin Injectable  hydrocortisone sodium succinate Injectable  Ascorbic Acid in 160mL NS 1500 milliGRAM(s)  thiamine IVPB  acetaminophen    Suspension PRN  ondansetron Injectable PRN  pantoprazole    Tablet  ceFAZolin   IVPB  ceFAZolin   IVPB  gabapentin

## 2017-09-01 NOTE — DIETITIAN INITIAL EVALUATION ADULT. - OTHER INFO
Pt admitted with cardiogenic shock. DASH diet started, minimal intake thus far. Food preferences taken, menu filled out.  9/1 weight noted 206#, if accurate, likely from fluid shifts

## 2017-09-01 NOTE — PHYSICAL THERAPY INITIAL EVALUATION ADULT - ACTIVE RANGE OF MOTION EXAMINATION, REHAB EVAL
deficits as listed below/R shoulder flexion to 100deg, L shoulder flexion to 90deg, b/l elbow flexion to 90deg./bilateral  lower extremity Active ROM was WFL (within functional limits)

## 2017-09-01 NOTE — PROGRESS NOTE ADULT - SUBJECTIVE AND OBJECTIVE BOX
[facility]                                   [address] [place] [address]                                                      PHONE: [phone]                                                         FAX: [phone]  [email or web address]  ---------------------------------------------------------------------------------------------------------------------------------    Overnight events/patient complaints:  Pt feels well but is tired    No Known Allergies    MEDICATIONS  (STANDING):  enoxaparin Injectable 40 milliGRAM(s) SubCutaneous daily  hydrocortisone sodium succinate Injectable 50 milliGRAM(s) IV Push every 6 hours  Ascorbic Acid in 160mL NS 1500 milliGRAM(s) 160 milliLiter(s) IV Intermittent every 6 hours  thiamine IVPB 200 milliGRAM(s) IV Intermittent every 12 hours  pantoprazole    Tablet 40 milliGRAM(s) Oral before breakfast  ceFAZolin   IVPB   IV Intermittent   ceFAZolin   IVPB 2000 milliGRAM(s) IV Intermittent every 8 hours  gabapentin 300 milliGRAM(s) Oral two times a day    MEDICATIONS  (PRN):  acetaminophen    Suspension 650 milliGRAM(s) Oral every 6 hours PRN For Temp greater than 38.5 C (101.3 F)  ondansetron Injectable 4 milliGRAM(s) IV Push every 6 hours PRN Nausea and/or Vomiting      Vital Signs Last 24 Hrs  T(C): 37.3 (01 Sep 2017 07:00), Max: 37.3 (01 Sep 2017 07:00)  T(F): 99.1 (01 Sep 2017 07:00), Max: 99.1 (01 Sep 2017 07:00)  HR: 87 (01 Sep 2017 08:00) (80 - 95)  BP: 151/85 (01 Sep 2017 08:00) (120/80 - 151/85)  BP(mean): 112 (01 Sep 2017 08:00) (95 - 112)  RR: 27 (01 Sep 2017 08:00) (23 - 32)  SpO2: 96% (01 Sep 2017 08:00) (90% - 98%)  ICU Vital Signs Last 24 Hrs  KIMMY GRANT  I&O's Detail    31 Aug 2017 07:01  -  01 Sep 2017 07:00  --------------------------------------------------------  IN:    dexmedetomidine Infusion: 30.6 mL    DOBUTamine Infusion: 18.3 mL    DOBUTamine Infusion: 24.4 mL    multiple electrolytes Injection Type 1multiple electrolytes Injection Type 1: 150 mL    Oral Fluid: 600 mL    Solution: 325 mL    Solution: 740 mL    Solution: 1565 mL  Total IN: 3453.3 mL    OUT:    Indwelling Catheter - Urethral: 1745 mL  Total OUT: 1745 mL    Total NET: 1708.3 mL      01 Sep 2017 07:01  -  01 Sep 2017 13:21  --------------------------------------------------------  IN:    Solution: 127.5 mL  Total IN: 127.5 mL    OUT:    Indwelling Catheter - Urethral: 160 mL    Voided: 600 mL  Total OUT: 760 mL    Total NET: -632.5 mL        Drug Dosing Weight  KIMMY GRANT      PHYSICAL EXAM:  General: Appears well developed, well nourished alert and cooperative.  HEENT: Head; normocephalic, atraumatic.  Eyes: Pupils reactive, cornea wnl.  Neck: Supple, no nodes adenopathy, no NVD or carotid bruit or thyromegaly.  CARDIOVASCULAR: Normal S1 and S2, No murmur, rub, gallop or lift.   LUNGS: No rales, rhonchi or wheeze. Normal breath sounds bilaterally.  ABDOMEN: Soft, nontender without mass or organomegaly. bowel sounds normoactive.  EXTREMITIES: No clubbing, cyanosis  . Distal pulses wnl. Trace UE edema  SKIN: warm and dry with normal turgor.  NEURO: Alert/oriented x 3/normal motor exam. No pathologic reflexes.    PSYCH: normal affect.        LABS:                        13.2   9.1   )-----------( 177      ( 31 Aug 2017 05:48 )             38.8     09-01    145  |  105  |  24.0<H>  ----------------------------<  122<H>  3.1<L>   |  26.0  |  0.86    Ca    8.2<L>      01 Sep 2017 11:16  Phos  2.8     08-31  Mg     2.2     08-31    TPro  5.6<L>  /  Alb  3.2<L>  /  TBili  0.6  /  DBili  x   /  AST  922<H>  /  ALT  1905<H>  /  AlkPhos  62  09-01    KIMMY GRANT  CARDIAC MARKERS ( 01 Sep 2017 03:19 )  x     / x     / 1004 U/L / x     / 25.9 ng/mL  CARDIAC MARKERS ( 31 Aug 2017 05:48 )  x     / 0.38 ng/mL / 1766 U/L / x     / 62.2 ng/mL  CARDIAC MARKERS ( 30 Aug 2017 22:06 )  x     / 0.44 ng/mL / 2462 U/L / x     / 101.0 ng/mL  CARDIAC MARKERS ( 30 Aug 2017 20:15 )  x     / x     / 2569 U/L / x     / 112.6 ng/mL      PT/INR - ( 30 Aug 2017 14:40 )   PT: 13.0 sec;   INR: 1.18 ratio               RADIOLOGY & ADDITIONAL STUDIES:  ECG: NS @ 98 no acute ischemic changes    ECHO: < from: TTE Echo Complete w/Doppler (08.30.17 @ 16:22) >  Summary:   1. Left ventricular ejection fraction, by visual estimation, is 40%.   2. Technically difficult study.   3. Mildly to moderately decreased global left ventricular systolic   function.   4. Apical septal segment, apical lateral segment, and apex are abnormal   as described above.   5. Abnormal septal motion consistent with left bundle branch block.   6. Spectral Doppler shows impaired relaxation pattern of left   ventricular myocardial filling (Grade I diastolic dysfunction).   7. LV was imaged on ionotropic support.   8. Severely enlarged right ventricle.   9. Moderately reduced RV systolic function.  10. Due to Rv dilatation TR jet may underestimate pulmonary pressure.  11. Mildly dilated right atrium.  12. There is no evidence of pericardial effusion.  13. Mild mitral annular calcification.  14. Mild mitral valve regurgitation.  15. Thickening of the anterior and posterior mitral valve leaflets.  16. Sclerotic aortic valve with normal opening.  17. Endocardial visualization was enhanced with intravenous echo contrast.     MD Audrey Electronically signed on 8/30/2017 at 5:29:29 PM    < end of copied text >           Assessment and Plan:  In summary, KIMMY GRANT is an 57y Male with past medical history significant for HTN, HLD, nonobstructive CAD on cath 2012, SPECT 1/15: no evidence of ischemia, past tobacco use, ECHO 1/15:EF 50-55% no significant valvular dz admitted after taking suboxone x 1 dose and then was found unresponsive on his back for possibly several hours and agonal breathing.  He was intubated and successfully extubated.  He originally required levo, ministerio and was persistently hypotensive until dobutamine was started.  He was subsequently weaned off levo and ministerio but requires dobutamine.    shock improved     1) Newly reduced EF etiology unclear (possibly related to being critically ill but will need cardiac cath prior to discharge)  2) ASA  3) Currently off dobutamine  4) Will also repeat echo Monday  to assesss EF

## 2017-09-01 NOTE — DIETITIAN INITIAL EVALUATION ADULT. - NS AS NUTRI INTERV MEALS SNACK
pt declined supplements, food preferences taken. Encouraged small frequent meals/General/healthful diet

## 2017-09-01 NOTE — PROGRESS NOTE ADULT - PROBLEM SELECTOR PLAN 3
At this point remains in non-oliguric NAM secondary to hypotension, however urine outpt continues to dwindle.  Did receive IV dye as well.  Currently receiving acetylcysteine.  Will repeat lactic if normal with normal POCUS, will possibly challenge with diuretics if needed to maintain even to negative fluid balance if needed.  Monitor closely.

## 2017-09-02 DIAGNOSIS — R74.8 ABNORMAL LEVELS OF OTHER SERUM ENZYMES: ICD-10-CM

## 2017-09-02 LAB
ALBUMIN SERPL ELPH-MCNC: 3.4 G/DL — SIGNIFICANT CHANGE UP (ref 3.3–5.2)
ALP SERPL-CCNC: 69 U/L — SIGNIFICANT CHANGE UP (ref 40–120)
ALT FLD-CCNC: 1999 U/L — HIGH
ANION GAP SERPL CALC-SCNC: 10 MMOL/L — SIGNIFICANT CHANGE UP (ref 5–17)
AST SERPL-CCNC: 613 U/L — HIGH
BILIRUB SERPL-MCNC: 0.7 MG/DL — SIGNIFICANT CHANGE UP (ref 0.4–2)
BUN SERPL-MCNC: 26 MG/DL — HIGH (ref 8–20)
CALCIUM SERPL-MCNC: 8.4 MG/DL — LOW (ref 8.6–10.2)
CHLORIDE SERPL-SCNC: 104 MMOL/L — SIGNIFICANT CHANGE UP (ref 98–107)
CO2 SERPL-SCNC: 32 MMOL/L — HIGH (ref 22–29)
CREAT SERPL-MCNC: 0.72 MG/DL — SIGNIFICANT CHANGE UP (ref 0.5–1.3)
GLUCOSE SERPL-MCNC: 100 MG/DL — SIGNIFICANT CHANGE UP (ref 70–115)
HCT VFR BLD CALC: 39.4 % — LOW (ref 42–52)
HGB BLD-MCNC: 13.1 G/DL — LOW (ref 14–18)
MCHC RBC-ENTMCNC: 29.8 PG — SIGNIFICANT CHANGE UP (ref 27–31)
MCHC RBC-ENTMCNC: 33.2 G/DL — SIGNIFICANT CHANGE UP (ref 32–36)
MCV RBC AUTO: 89.7 FL — SIGNIFICANT CHANGE UP (ref 80–94)
PLATELET # BLD AUTO: 174 K/UL — SIGNIFICANT CHANGE UP (ref 150–400)
POTASSIUM SERPL-MCNC: 3.7 MMOL/L — SIGNIFICANT CHANGE UP (ref 3.5–5.3)
POTASSIUM SERPL-SCNC: 3.7 MMOL/L — SIGNIFICANT CHANGE UP (ref 3.5–5.3)
PROCALCITONIN SERPL-MCNC: 3.64 NG/ML — HIGH (ref 0–0.04)
PROT SERPL-MCNC: 6.2 G/DL — LOW (ref 6.6–8.7)
RBC # BLD: 4.39 M/UL — LOW (ref 4.6–6.2)
RBC # FLD: 14.2 % — SIGNIFICANT CHANGE UP (ref 11–15.6)
S PNEUM AG SER QL: SIGNIFICANT CHANGE UP
SODIUM SERPL-SCNC: 146 MMOL/L — HIGH (ref 135–145)
WBC # BLD: 10.7 K/UL — SIGNIFICANT CHANGE UP (ref 4.8–10.8)
WBC # FLD AUTO: 10.7 K/UL — SIGNIFICANT CHANGE UP (ref 4.8–10.8)

## 2017-09-02 PROCEDURE — 99233 SBSQ HOSP IP/OBS HIGH 50: CPT

## 2017-09-02 RX ORDER — LOSARTAN POTASSIUM 100 MG/1
25 TABLET, FILM COATED ORAL DAILY
Qty: 0 | Refills: 0 | Status: DISCONTINUED | OUTPATIENT
Start: 2017-09-02 | End: 2017-09-05

## 2017-09-02 RX ADMIN — GABAPENTIN 300 MILLIGRAM(S): 400 CAPSULE ORAL at 17:57

## 2017-09-02 RX ADMIN — ENOXAPARIN SODIUM 40 MILLIGRAM(S): 100 INJECTION SUBCUTANEOUS at 11:18

## 2017-09-02 RX ADMIN — GABAPENTIN 300 MILLIGRAM(S): 400 CAPSULE ORAL at 06:15

## 2017-09-02 RX ADMIN — LOSARTAN POTASSIUM 25 MILLIGRAM(S): 100 TABLET, FILM COATED ORAL at 09:40

## 2017-09-02 RX ADMIN — Medication 50 MILLIGRAM(S): at 06:14

## 2017-09-02 RX ADMIN — Medication 100 MILLIGRAM(S): at 14:30

## 2017-09-02 RX ADMIN — Medication 102 MILLIGRAM(S): at 17:58

## 2017-09-02 RX ADMIN — Medication 100 MILLIGRAM(S): at 21:10

## 2017-09-02 RX ADMIN — Medication 25 MILLIGRAM(S): at 15:20

## 2017-09-02 RX ADMIN — Medication 25 MILLIGRAM(S): at 06:15

## 2017-09-02 RX ADMIN — Medication 100 MILLIGRAM(S): at 06:15

## 2017-09-02 RX ADMIN — Medication 102 MILLIGRAM(S): at 06:15

## 2017-09-02 RX ADMIN — PANTOPRAZOLE SODIUM 40 MILLIGRAM(S): 20 TABLET, DELAYED RELEASE ORAL at 06:15

## 2017-09-02 RX ADMIN — Medication 50 MILLIGRAM(S): at 00:38

## 2017-09-02 NOTE — PROGRESS NOTE ADULT - SUBJECTIVE AND OBJECTIVE BOX
INTERNAL MEDICINE HOSPITALIST TRANSFER ACCEPTANCE NOTE:    CC: SOB    INTERVAL HPI/OVERNIGHT EVENTS:Patient seen and examined, sob is improving. denies chest pain or palpitations.       Vital Signs Last 24 Hrs  T(C): 36.6 (02 Sep 2017 11:39), Max: 37.2 (01 Sep 2017 15:00)  T(F): 97.8 (02 Sep 2017 11:39), Max: 98.9 (01 Sep 2017 15:00)  HR: 66 (02 Sep 2017 07:00) (64 - 84)  BP: 165/90 (02 Sep 2017 07:00) (134/79 - 176/98)  BP(mean): 119 (02 Sep 2017 07:00) (102 - 130)  RR: 20 (02 Sep 2017 07:00) (18 - 32)  SpO2: 97% (02 Sep 2017 07:00) (95% - 100%)    PHYSICAL EXAM:    GENERAL: NAD, AOx3  HEAD:  Atraumatic, Normocephalic  EYES: EOMI, PERRLA, conjunctiva and sclera clear  ENMT: Moist mucous membranes  NECK: Supple, No JVD  CHEST/LUNG: Clear to auscultation bilaterally; No rales, rhonchi, wheezing, or rubs  HEART: Regular rate and rhythm; No murmurs, rubs, or gallops  ABDOMEN: Soft, Nontender, Nondistended; Bowel sounds present  EXTREMITIES:  2+ Peripheral Pulses, No clubbing, cyanosis, or edema        MEDICATIONS  (STANDING):  enoxaparin Injectable 40 milliGRAM(s) SubCutaneous daily  thiamine IVPB 200 milliGRAM(s) IV Intermittent every 12 hours  pantoprazole    Tablet 40 milliGRAM(s) Oral before breakfast  ceFAZolin   IVPB   IV Intermittent   ceFAZolin   IVPB 2000 milliGRAM(s) IV Intermittent every 8 hours  gabapentin 300 milliGRAM(s) Oral two times a day  metoprolol 25 milliGRAM(s) Oral two times a day  losartan 25 milliGRAM(s) Oral daily    MEDICATIONS  (PRN):  acetaminophen    Suspension 650 milliGRAM(s) Oral every 6 hours PRN For Temp greater than 38.5 C (101.3 F)  ondansetron Injectable 4 milliGRAM(s) IV Push every 6 hours PRN Nausea and/or Vomiting      Allergies    No Known Allergies    Intolerances          LABS:                          13.1   10.7  )-----------( 174      ( 02 Sep 2017 05:43 )             39.4     09-02    146<H>  |  104  |  26.0<H>  ----------------------------<  100  3.7   |  32.0<H>  |  0.72    Ca    8.4<L>      02 Sep 2017 05:43    TPro  6.2<L>  /  Alb  3.4  /  TBili  0.7  /  DBili  x   /  AST  613<H>  /  ALT  1999<H>  /  AlkPhos  69  09-02          RADIOLOGY & ADDITIONAL TESTS:

## 2017-09-02 NOTE — PROGRESS NOTE ADULT - PROBLEM SELECTOR PLAN 2
secondary to aspiriation pneumonia now improving  wean o2 as tolerating  increase ambulation out of bed secondary to aspiration pneumonia now improving  wean o2 as tolerating  increase ambulation out of bed

## 2017-09-02 NOTE — PROGRESS NOTE ADULT - PROBLEM SELECTOR PLAN 3
Echocardiogram- ef of 40-45% with systolic dysfunction possibly secondary to septic shock  on bb  re-evaluate with repeat echocardiogram on monday Echocardiogram- ef of 40-45% with systolic dysfunction possibly secondary to septic shock. Nuclear stress test earlier this year was normal.   on bb  re-evaluate with repeat echocardiogram on monday per cardiology

## 2017-09-02 NOTE — PROGRESS NOTE ADULT - SUBJECTIVE AND OBJECTIVE BOX
NEPHROLOGY INTERVAL HPI/OVERNIGHT EVENTS:  pt clinically stable  seated in chair earlier==> generalized weakness    MEDICATIONS  (STANDING):  enoxaparin Injectable 40 milliGRAM(s) SubCutaneous daily  thiamine IVPB 200 milliGRAM(s) IV Intermittent every 12 hours  pantoprazole    Tablet 40 milliGRAM(s) Oral before breakfast  ceFAZolin   IVPB   IV Intermittent   ceFAZolin   IVPB 2000 milliGRAM(s) IV Intermittent every 8 hours  gabapentin 300 milliGRAM(s) Oral two times a day  metoprolol 25 milliGRAM(s) Oral two times a day  losartan 25 milliGRAM(s) Oral daily    MEDICATIONS  (PRN):  acetaminophen    Suspension 650 milliGRAM(s) Oral every 6 hours PRN For Temp greater than 38.5 C (101.3 F)  ondansetron Injectable 4 milliGRAM(s) IV Push every 6 hours PRN Nausea and/or Vomiting      Allergies    No Known Allergies    Intolerances            Vital Signs Last 24 Hrs  T(C): 36.9 (02 Sep 2017 04:00), Max: 37.2 (01 Sep 2017 15:00)  T(F): 98.4 (02 Sep 2017 04:00), Max: 98.9 (01 Sep 2017 15:00)  HR: 66 (02 Sep 2017 07:00) (64 - 97)  BP: 165/90 (02 Sep 2017 07:00) (134/79 - 176/98)  BP(mean): 119 (02 Sep 2017 07:00) (102 - 130)  RR: 20 (02 Sep 2017 07:00) (18 - 34)  SpO2: 97% (02 Sep 2017 07:00) (94% - 100%)    PHYSICAL EXAM:  GENERAL: weakness  NECK: Supple, No JVD  NERVOUS SYSTEM:  AAO x 3; non focal  CHEST/LUNG: Clear to percussion bilaterally; No rales, rhonchi, wheezing, or rubs  HEART: Regular rate and rhythm; No rub  ABDOMEN: Soft, Nontender, Nondistended; Bowel sounds present  EXTREMITIES:  2+ Peripheral Pulses, No clubbing, cyanosis, or edema  SKIN: No rashes or lesions    LABS:                        13.1   10.7  )-----------( 174      ( 02 Sep 2017 05:43 )             39.4     09-02    146<H>  |  104  |  26.0<H>  ----------------------------<  100  3.7   |  32.0<H>  |  0.72    Ca    8.4<L>      02 Sep 2017 05:43    TPro  6.2<L>  /  Alb  3.4  /  TBili  0.7  /  DBili  x   /  AST  613<H>  /  ALT  1999<H>  /  AlkPhos  69  09-02            RADIOLOGY & ADDITIONAL TESTS:

## 2017-09-02 NOTE — PROGRESS NOTE ADULT - PROBLEM SELECTOR PLAN 1
Secondary to aspiration pneumonia no resolving  Sputum culture + e coli- on ancef day 5/7  Blood cultures negative x 2 Secondary to aspiration pneumonia now resolving  Sputum culture + e coli- on ancef day 5/7  Blood cultures negative x 2

## 2017-09-02 NOTE — PROGRESS NOTE ADULT - SUBJECTIVE AND OBJECTIVE BOX
Patient is a 57y old  Male who presents with a chief complaint of Unresponsive (30 Aug 2017 11:13)      BRIEF HOSPITAL COURSE: 57M with PMHx HTN, HLD, BIBA after being found in bed unresponsive with agonal breathing likely secondary to suboxone.  Was intubated and managed for  septic/cardiogenic shock, now extubated, off pressors, doing well    Events last 24 hours: no significant events    PAST MEDICAL & SURGICAL HISTORY:  Chronic back pain  High cholesterol  Essential hypertension  No significant past surgical history      Review of Systems:  CONSTITUTIONAL: \fatigue  EYES: No eye pain, visual disturbances, or discharge  ENMT:  No difficulty hearing, tinnitus, vertigo; No sinus or throat pain  NECK: No pain or stiffness  RESPIRATORY: No cough, wheezing, chills or hemoptysis; No shortness of breath  CARDIOVASCULAR: No chest pain, palpitations, dizziness, or leg swelling  GASTROINTESTINAL: No abdominal or epigastric pain. No nausea, vomiting, or hematemesis; No diarrhea or constipation. No melena or hematochezia.  GENITOURINARY: No dysuria, frequency, hematuria, or incontinence  NEUROLOGICAL: No headaches, memory loss, loss of strength, numbness, or tremors  SKIN: No itching, burning, rashes, or lesions   MUSCULOSKELETAL: No joint pain or swelling; No muscle, back, or extremity pain  PSYCHIATRIC: lethargic      Medications:  ceFAZolin   IVPB   IV Intermittent   ceFAZolin   IVPB 2000 milliGRAM(s) IV Intermittent every 8 hours    metoprolol 25 milliGRAM(s) Oral two times a day  losartan 25 milliGRAM(s) Oral daily      acetaminophen    Suspension 650 milliGRAM(s) Oral every 6 hours PRN  ondansetron Injectable 4 milliGRAM(s) IV Push every 6 hours PRN  gabapentin 300 milliGRAM(s) Oral two times a day      enoxaparin Injectable 40 milliGRAM(s) SubCutaneous daily    pantoprazole    Tablet 40 milliGRAM(s) Oral before breakfast      hydrocortisone sodium succinate Injectable 50 milliGRAM(s) IV Push every 6 hours    thiamine IVPB 200 milliGRAM(s) IV Intermittent every 12 hours        Ascorbic Acid in 160mL NS 1500 milliGRAM(s) 160 milliLiter(s) IV Intermittent every 6 hours          ICU Vital Signs Last 24 Hrs  T(C): 36.9 (02 Sep 2017 04:00), Max: 37.2 (01 Sep 2017 15:00)  T(F): 98.4 (02 Sep 2017 04:00), Max: 98.9 (01 Sep 2017 15:00)  HR: 66 (02 Sep 2017 07:00) (64 - 97)  BP: 165/90 (02 Sep 2017 07:00) (134/79 - 176/98)  BP(mean): 119 (02 Sep 2017 07:00) (102 - 130)  ABP: --  ABP(mean): --  RR: 20 (02 Sep 2017 07:00) (18 - 34)  SpO2: 97% (02 Sep 2017 07:00) (94% - 100%)          I&O's Detail    01 Sep 2017 07:01  -  02 Sep 2017 07:00  --------------------------------------------------------  IN:    Oral Fluid: 1050 mL    Solution: 150 mL    Solution: 387.5 mL    Solution: 100 mL    Solution: 450 mL  Total IN: 2137.5 mL    OUT:    Indwelling Catheter - Urethral: 160 mL    Voided: 2750 mL  Total OUT: 2910 mL    Total NET: -772.5 mL            LABS:                        13.1   10.7  )-----------( 174      ( 02 Sep 2017 05:43 )             39.4     09-02    146<H>  |  104  |  26.0<H>  ----------------------------<  100  3.7   |  32.0<H>  |  0.72    Ca    8.4<L>      02 Sep 2017 05:43    TPro  6.2<L>  /  Alb  3.4  /  TBili  0.7  /  DBili  x   /  AST  613<H>  /  ALT  1999<H>  /  AlkPhos  69  09-02      CARDIAC MARKERS ( 01 Sep 2017 03:19 )  x     / x     / 1004 U/L / x     / 25.9 ng/mL      CAPILLARY BLOOD GLUCOSE  120 (31 Aug 2017 12:00)            CULTURES:      Physical Examination:    General: No acute distress.  Alert, oriented, interactive, nonfocal    HEENT: Pupils equal, reactive to light.  Symmetric.    PULM: Clear to auscultation bilaterally, no significant sputum production    CVS: Regular rate and rhythm, no murmurs, rubs, or gallops    ABD: Soft, nondistended, nontender, normoactive bowel sounds, no masses    EXT: No edema, nontender    SKIN: Warm and well perfused, no rashes noted.    RADIOLOGY:     CRITICAL CARE TIME SPENT:

## 2017-09-02 NOTE — PROGRESS NOTE ADULT - SUBJECTIVE AND OBJECTIVE BOX
[place] [facility] - [facility]                                   [address] [place] [address]                                                      PHONE: [phone]                                                         FAX: [phone]  [email or web address]  ---------------------------------------------------------------------------------------------------------------------------------    Overnight events/patient complaints:  NAD feeling much better     No Known Allergies    MEDICATIONS  (STANDING):  enoxaparin Injectable 40 milliGRAM(s) SubCutaneous daily  hydrocortisone sodium succinate Injectable 50 milliGRAM(s) IV Push every 6 hours  Ascorbic Acid in 160mL NS 1500 milliGRAM(s) 160 milliLiter(s) IV Intermittent every 6 hours  thiamine IVPB 200 milliGRAM(s) IV Intermittent every 12 hours  pantoprazole    Tablet 40 milliGRAM(s) Oral before breakfast  ceFAZolin   IVPB   IV Intermittent   ceFAZolin   IVPB 2000 milliGRAM(s) IV Intermittent every 8 hours  gabapentin 300 milliGRAM(s) Oral two times a day  metoprolol 25 milliGRAM(s) Oral two times a day  losartan 25 milliGRAM(s) Oral daily    MEDICATIONS  (PRN):  acetaminophen    Suspension 650 milliGRAM(s) Oral every 6 hours PRN For Temp greater than 38.5 C (101.3 F)  ondansetron Injectable 4 milliGRAM(s) IV Push every 6 hours PRN Nausea and/or Vomiting      Vital Signs Last 24 Hrs  T(C): 36.9 (02 Sep 2017 04:00), Max: 37.2 (01 Sep 2017 15:00)  T(F): 98.4 (02 Sep 2017 04:00), Max: 98.9 (01 Sep 2017 15:00)  HR: 66 (02 Sep 2017 07:00) (64 - 97)  BP: 165/90 (02 Sep 2017 07:00) (134/79 - 176/98)  BP(mean): 119 (02 Sep 2017 07:00) (102 - 130)  RR: 20 (02 Sep 2017 07:00) (18 - 34)  SpO2: 97% (02 Sep 2017 07:00) (94% - 100%)  ICU Vital Signs Last 24 Hrs  KIMMY GRANT  I&O's Detail    01 Sep 2017 07:01  -  02 Sep 2017 07:00  --------------------------------------------------------  IN:    Oral Fluid: 1050 mL    Solution: 150 mL    Solution: 387.5 mL    Solution: 100 mL    Solution: 450 mL  Total IN: 2137.5 mL    OUT:    Indwelling Catheter - Urethral: 160 mL    Voided: 2750 mL  Total OUT: 2910 mL    Total NET: -772.5 mL        I&O's Summary    01 Sep 2017 07:01  -  02 Sep 2017 07:00  --------------------------------------------------------  IN: 2137.5 mL / OUT: 2910 mL / NET: -772.5 mL      Drug Dosing Weight  KIMMY GRANT      PHYSICAL EXAM:  General: Appears well developed, well nourished alert and cooperative.  HEENT: Head; normocephalic, atraumatic.  Eyes: Pupils reactive, cornea wnl.  Neck: Supple, no nodes adenopathy, no NVD or carotid bruit or thyromegaly.  CARDIOVASCULAR: Normal S1 and S2, 2/6 murmur, rub, gallop or lift.   LUNGS: Decrease BS B/L   ABDOMEN: Soft, nontender without mass or organomegaly. bowel sounds normoactive.  EXTREMITIES: No clubbing, cyanosis or edema. Distal pulses wnl.   SKIN: warm and dry with normal turgor.  NEURO: Alert/oriented x 3/normal motor exam. No pathologic reflexes.    PSYCH: normal affect.        LABS:                        13.1   10.7  )-----------( 174      ( 02 Sep 2017 05:43 )             39.4     09-02    146<H>  |  104  |  26.0<H>  ----------------------------<  100  3.7   |  32.0<H>  |  0.72    Ca    8.4<L>      02 Sep 2017 05:43    TPro  6.2<L>  /  Alb  3.4  /  TBili  0.7  /  DBili  x   /  AST  613<H>  /  ALT  1999<H>  /  AlkPhos  69  09-02    KIMMY GRANT  CARDIAC MARKERS ( 01 Sep 2017 03:19 )  x     / x     / 1004 U/L / x     / 25.9 ng/mL            RADIOLOGY & ADDITIONAL STUDIES:    INTERPRETATION OF TELEMETRY (personally reviewed):        ECHO:  Summary:   1. Left ventricular ejection fraction, by visual estimation, is 40%.   2. Technically difficult study.   3. Mildly to moderately decreased global left ventricular systolic   function.   4. Apical septal segment, apical lateral segment, and apex are abnormal   as described above.   5. Abnormal septal motion consistent with left bundle branch block.   6. Spectral Doppler shows impaired relaxation pattern of left   ventricular myocardial filling (Grade I diastolic dysfunction).   7. LV was imaged on ionotropic support.   8. Severely enlarged right ventricle.   9. Moderately reduced RV systolic function.  10. Due to Rv dilatation TR jet may underestimate pulmonary pressure.  11. Mildly dilated right atrium.  12. There is no evidence of pericardial effusion.  13. Mild mitral annular calcification.  14. Mild mitral valve regurgitation.  15. Thickening of the anterior and posterior mitral valve leaflets.  16. Sclerotic aortic valve with normal opening.  17. Endocardial visualization was enhanced with intravenous echo contrast.      STRESS TEST: normal perfusion 2015    CARDIAC CATHETERIZATION: pending      Assessment and Plan:  In summary, KIMMY GRANT is an 57y Male with past medical history significant for HTN, HLD, nonobstructive CAD on cath 2012, SPECT 1/15 normal perfusion without any evidence of ischemia, ex smoker; ECHO 1/15:EF 50-55% no significant valvular dz admitted after taking suboxone x 1 dose and then was found unresponsive on his back for possibly several hours and agonal breathing.  He was intubated and successfully extubated.  He originally required levo, ministerio and was persistently hypotensive now improved off pressor HD stable; mild LV dysfunction likely stress induce critically ill          1.  C cath pre discharge   2.  Repeat ECHO on Monday to re-evaluate LV function   3.  Agree with ASA and ARB   4.  Ok for TELE

## 2017-09-03 DIAGNOSIS — I10 ESSENTIAL (PRIMARY) HYPERTENSION: ICD-10-CM

## 2017-09-03 DIAGNOSIS — Z29.9 ENCOUNTER FOR PROPHYLACTIC MEASURES, UNSPECIFIED: ICD-10-CM

## 2017-09-03 PROCEDURE — 76700 US EXAM ABDOM COMPLETE: CPT | Mod: 26

## 2017-09-03 PROCEDURE — 99233 SBSQ HOSP IP/OBS HIGH 50: CPT

## 2017-09-03 RX ORDER — LIDOCAINE 4 G/100G
1 CREAM TOPICAL DAILY
Qty: 0 | Refills: 0 | Status: DISCONTINUED | OUTPATIENT
Start: 2017-09-03 | End: 2017-09-05

## 2017-09-03 RX ORDER — IBUPROFEN 200 MG
400 TABLET ORAL THREE TIMES A DAY
Qty: 0 | Refills: 0 | Status: DISCONTINUED | OUTPATIENT
Start: 2017-09-03 | End: 2017-09-05

## 2017-09-03 RX ORDER — SODIUM CHLORIDE 0.65 %
1 AEROSOL, SPRAY (ML) NASAL EVERY 4 HOURS
Qty: 0 | Refills: 0 | Status: DISCONTINUED | OUTPATIENT
Start: 2017-09-03 | End: 2017-09-05

## 2017-09-03 RX ADMIN — PANTOPRAZOLE SODIUM 40 MILLIGRAM(S): 20 TABLET, DELAYED RELEASE ORAL at 06:05

## 2017-09-03 RX ADMIN — Medication 400 MILLIGRAM(S): at 18:03

## 2017-09-03 RX ADMIN — ENOXAPARIN SODIUM 40 MILLIGRAM(S): 100 INJECTION SUBCUTANEOUS at 22:00

## 2017-09-03 RX ADMIN — Medication 100 MILLIGRAM(S): at 22:00

## 2017-09-03 RX ADMIN — Medication 102 MILLIGRAM(S): at 18:03

## 2017-09-03 RX ADMIN — Medication 400 MILLIGRAM(S): at 13:26

## 2017-09-03 RX ADMIN — Medication 100 MILLIGRAM(S): at 13:21

## 2017-09-03 RX ADMIN — Medication 400 MILLIGRAM(S): at 14:32

## 2017-09-03 RX ADMIN — GABAPENTIN 300 MILLIGRAM(S): 400 CAPSULE ORAL at 05:40

## 2017-09-03 RX ADMIN — Medication 100 MILLIGRAM(S): at 05:40

## 2017-09-03 RX ADMIN — Medication 400 MILLIGRAM(S): at 20:22

## 2017-09-03 RX ADMIN — GABAPENTIN 300 MILLIGRAM(S): 400 CAPSULE ORAL at 18:03

## 2017-09-03 RX ADMIN — LOSARTAN POTASSIUM 25 MILLIGRAM(S): 100 TABLET, FILM COATED ORAL at 06:05

## 2017-09-03 RX ADMIN — Medication 25 MILLIGRAM(S): at 18:03

## 2017-09-03 RX ADMIN — Medication 102 MILLIGRAM(S): at 05:40

## 2017-09-03 RX ADMIN — Medication 25 MILLIGRAM(S): at 05:40

## 2017-09-03 NOTE — PROGRESS NOTE ADULT - ATTENDING COMMENTS
PT extubated this morning, Dobutrex off, continue Zosyn and Evans protocol, mobilize pt and monitor renal function. Family updated today at bedside.
Wife and daughter updated at bedside, aware of the gravity of the situation.
I agree with the above with the following additions:    1. E Coli pneumonia -- will tailor antibiotics to ancef; check procalcitonin tomorrow morning.  Clinically far improved.  May only require shortened course of antibiotics given clinical response.  Again, would follow procalcitonins to guide therapy.  Continue "Marik protocol" steroids/thiamine/vitamin c while in MICU; would discontinue steroids upon discharge from MICU.  2. Cardiomyopathy -- may benefit from ischemic workup -- could be related to septic shock as this is a known primary issue with sepsis.  Cardiology service following.  No longer needing inotropic therapy.  3. Transaminitis -- uptrending today despite improved hemodynamics.  Continue empiric NAC; would consider RUQ evaluation tomorrow if worsening (CT on admission with fatty changes, no GB issues); sometimes LFT elevations can lag a day or so.  CMP ordered for tomorrow.  4. Hypokalemia -- repleted.    Discussed at length with wife and daughter at bedside.  Needs PT.  Gabapentin added for pain control.  Likely able to transfer from MICU.
discussed with patient's wife and daughter, updated
Agree with transfer out of icu to any bed with

## 2017-09-03 NOTE — PROGRESS NOTE ADULT - PROBLEM SELECTOR PLAN 1
Secondary to aspiration pneumonia - improving   Sputum culture + e coli- on ancef - end date 09/04  Blood cultures negative x 2

## 2017-09-03 NOTE — PROGRESS NOTE ADULT - PROBLEM SELECTOR PLAN 2
secondary to aspiration pneumonia - resolved   saturating well on room air, ID  encourage ambulation

## 2017-09-03 NOTE — PROGRESS NOTE ADULT - SUBJECTIVE AND OBJECTIVE BOX
Internal Medicine Hospitalist - Dr. Issa GRANT    [id number]    57y      Male    Patient is a 57y old  Male who presents with a chief complaint of Unresponsive (30 Aug 2017 11:13)    INTERVAL HPI/ OVERNIGHT EVENTS: Patient is seen and examined, report feeling better, appetite is improving, feeling weak and tired, c/o back pain, afebrile.     REVIEW OF SYSTEMS:    Denied fever, chills, abd. pain, nausea, vomiting, chest pain, SOB, headache, dizziness    PHYSICAL EXAM:    Vital Signs Last 24 Hrs  T(C): 36.8 (03 Sep 2017 08:26), Max: 36.8 (03 Sep 2017 08:26)  T(F): 98.3 (03 Sep 2017 08:26), Max: 98.3 (03 Sep 2017 08:26)  HR: 68 (03 Sep 2017 08:26) (61 - 85)  BP: 126/78 (03 Sep 2017 08:26) (126/78 - 163/81)  BP(mean): 106 (03 Sep 2017 07:15) (93 - 117)  RR: 20 (03 Sep 2017 08:26) (20 - 33)  SpO2: 97% (03 Sep 2017 08:26) (93% - 100%)    GENERAL: NAD  HEENT: EOMI  Neck: supple  CHEST/LUNG: positive air en try   HEART: S1S2+ audible  ABDOMEN: Soft, Nontender, Nondistended; Bowel sounds present  EXTREMITIES:  swollen upper extremities   CNS: AAO X 3  Psychiatry: normal mood    LABS:                        13.1   10.7  )-----------( 174      ( 02 Sep 2017 05:43 )             39.4     09-02    146<H>  |  104  |  26.0<H>  ----------------------------<  100  3.7   |  32.0<H>  |  0.72    Ca    8.4<L>      02 Sep 2017 05:43    TPro  6.2<L>  /  Alb  3.4  /  TBili  0.7  /  DBili  x   /  AST  613<H>  /  ALT  1999<H>  /  AlkPhos  69  09-02            MEDICATIONS  (STANDING):  enoxaparin Injectable 40 milliGRAM(s) SubCutaneous daily  thiamine IVPB 200 milliGRAM(s) IV Intermittent every 12 hours  pantoprazole    Tablet 40 milliGRAM(s) Oral before breakfast  ceFAZolin   IVPB   IV Intermittent   ceFAZolin   IVPB 2000 milliGRAM(s) IV Intermittent every 8 hours  gabapentin 300 milliGRAM(s) Oral two times a day  metoprolol 25 milliGRAM(s) Oral two times a day  losartan 25 milliGRAM(s) Oral daily  lidocaine   Patch 1 Patch Transdermal daily    MEDICATIONS  (PRN):  acetaminophen    Suspension 650 milliGRAM(s) Oral every 6 hours PRN For Temp greater than 38.5 C (101.3 F)  ondansetron Injectable 4 milliGRAM(s) IV Push every 6 hours PRN Nausea and/or Vomiting  sodium chloride 0.65% Nasal 1 Spray(s) Both Nostrils every 4 hours PRN Nasal Congestion      RADIOLOGY & ADDITIONAL TEST

## 2017-09-03 NOTE — PROGRESS NOTE ADULT - SUBJECTIVE AND OBJECTIVE BOX
[place] [facility], [facility]                                   [address] [place] [address]                                                      PHONE: [phone]                                                         FAX: [phone]  [email or web address]  ---------------------------------------------------------------------------------------------------------------------------------    Overnight events/patient complaints:  NAD feeling ok today     No Known Allergies    MEDICATIONS  (STANDING):  enoxaparin Injectable 40 milliGRAM(s) SubCutaneous daily  thiamine IVPB 200 milliGRAM(s) IV Intermittent every 12 hours  pantoprazole    Tablet 40 milliGRAM(s) Oral before breakfast  ceFAZolin   IVPB   IV Intermittent   ceFAZolin   IVPB 2000 milliGRAM(s) IV Intermittent every 8 hours  gabapentin 300 milliGRAM(s) Oral two times a day  metoprolol 25 milliGRAM(s) Oral two times a day  losartan 25 milliGRAM(s) Oral daily  lidocaine   Patch 1 Patch Transdermal daily    MEDICATIONS  (PRN):  acetaminophen    Suspension 650 milliGRAM(s) Oral every 6 hours PRN For Temp greater than 38.5 C (101.3 F)  ondansetron Injectable 4 milliGRAM(s) IV Push every 6 hours PRN Nausea and/or Vomiting  sodium chloride 0.65% Nasal 1 Spray(s) Both Nostrils every 4 hours PRN Nasal Congestion      Vital Signs Last 24 Hrs  T(C): 36.8 (03 Sep 2017 08:26), Max: 36.8 (03 Sep 2017 08:26)  T(F): 98.3 (03 Sep 2017 08:26), Max: 98.3 (03 Sep 2017 08:26)  HR: 68 (03 Sep 2017 08:26) (61 - 85)  BP: 126/78 (03 Sep 2017 08:26) (126/78 - 163/81)  BP(mean): 106 (03 Sep 2017 07:15) (93 - 117)  RR: 20 (03 Sep 2017 08:26) (20 - 33)  SpO2: 97% (03 Sep 2017 08:26) (93% - 100%)  ICU Vital Signs Last 24 Hrs  KIMMY GRANT  I&O's Detail    02 Sep 2017 07:01  -  03 Sep 2017 07:00  --------------------------------------------------------  IN:    Oral Fluid: 400 mL    Solution: 50 mL    Solution: 150 mL  Total IN: 600 mL    OUT:    Voided: 1975 mL  Total OUT: 1975 mL    Total NET: -1375 mL        I&O's Summary    02 Sep 2017 07:01  -  03 Sep 2017 07:00  --------------------------------------------------------  IN: 600 mL / OUT: 1975 mL / NET: -1375 mL      Drug Dosing Weight  KIMMY GRANT      PHYSICAL EXAM:  General: Appears well developed, well nourished alert and cooperative.  HEENT: Head; normocephalic, atraumatic.  Eyes: Pupils reactive, cornea wnl.  Neck: Supple, no nodes adenopathy, no NVD or carotid bruit or thyromegaly.  CARDIOVASCULAR: Normal S1 and S2, 2/6 murmur, rub, gallop or lift.   LUNGS: No rales, rhonchi or wheeze. Normal breath sounds bilaterally.  ABDOMEN: Soft, nontender without mass or organomegaly. bowel sounds normoactive.  EXTREMITIES: No clubbing, cyanosis or edema. Distal pulses wnl.   SKIN: warm and dry with normal turgor.  NEURO: Alert/oriented x 3/normal motor exam. No pathologic reflexes.    PSYCH: normal affect.        LABS:                        13.1   10.7  )-----------( 174      ( 02 Sep 2017 05:43 )             39.4     09-02    146<H>  |  104  |  26.0<H>  ----------------------------<  100  3.7   |  32.0<H>  |  0.72    Ca    8.4<L>      02 Sep 2017 05:43    TPro  6.2<L>  /  Alb  3.4  /  TBili  0.7  /  DBili  x   /  AST  613<H>  /  ALT  1999<H>  /  AlkPhos  69  09-02    KIMMY GRANT            RADIOLOGY & ADDITIONAL STUDIES:    INTERPRETATION OF TELEMETRY (personally reviewed):      ECHO:  Summary:   1. Left ventricular ejection fraction, by visual estimation, is 40%.   2. Technically difficult study.   3. Mildly to moderately decreased global left ventricular systolic   function.   4. Apical septal segment, apical lateral segment, and apex are abnormal   as described above.   5. Abnormal septal motion consistent with left bundle branch block.   6. Spectral Doppler shows impaired relaxation pattern of left   ventricular myocardial filling (Grade I diastolic dysfunction).   7. LV was imaged on ionotropic support.   8. Severely enlarged right ventricle.   9. Moderately reduced RV systolic function.  10. Due to Rv dilatation TR jet may underestimate pulmonary pressure.  11. Mildly dilated right atrium.  12. There is no evidence of pericardial effusion.  13. Mild mitral annular calcification.  14. Mild mitral valve regurgitation.  15. Thickening of the anterior and posterior mitral valve leaflets.  16. Sclerotic aortic valve with normal opening.  17. Endocardial visualization was enhanced with intravenous echo contrast.      STRESS TEST: normal perfusion 2015    CARDIAC CATHETERIZATION: pending      Assessment and Plan:  In summary, KIMMY GRANT is an 57y Male with past medical history significant for HTN, HLD, nonobstructive CAD on cath 2012, SPECT 1/15 normal perfusion without any evidence of ischemia, ex smoker; ECHO 1/15:EF 50-55% no significant valvular dz admitted after taking suboxone x 1 dose and then was found unresponsive on his back for possibly several hours and agonal breathing.  He was intubated and successfully extubated.  He originally required levo, ministerio and was persistently hypotensive now improved off pressor HD stable; mild LV dysfunction likely stress induce critically ill          1.  [facility] cath pre discharge likely Tues    2.  Repeat ECHO on Monday to re-evaluate LV function   3.  Agree with ASA and ARB   4.  Continue TELE

## 2017-09-03 NOTE — PROGRESS NOTE ADULT - PROBLEM SELECTOR PLAN 3
Echocardiogram- ef of 40-45% with systolic dysfunction possibly secondary to septic shock. Nuclear stress test earlier this year was normal.   c/w Metoprolol   per cardiology repeat TTE in am , likely cath later this week

## 2017-09-04 LAB
ALBUMIN SERPL ELPH-MCNC: 3.2 G/DL — LOW (ref 3.3–5.2)
ALP SERPL-CCNC: 92 U/L — SIGNIFICANT CHANGE UP (ref 40–120)
ALT FLD-CCNC: 398 U/L — HIGH
ANION GAP SERPL CALC-SCNC: 15 MMOL/L — SIGNIFICANT CHANGE UP (ref 5–17)
AST SERPL-CCNC: 78 U/L — HIGH
BILIRUB SERPL-MCNC: 0.6 MG/DL — SIGNIFICANT CHANGE UP (ref 0.4–2)
BUN SERPL-MCNC: 18 MG/DL — SIGNIFICANT CHANGE UP (ref 8–20)
CALCIUM SERPL-MCNC: 8.6 MG/DL — SIGNIFICANT CHANGE UP (ref 8.6–10.2)
CHLORIDE SERPL-SCNC: 102 MMOL/L — SIGNIFICANT CHANGE UP (ref 98–107)
CK SERPL-CCNC: 93 U/L — SIGNIFICANT CHANGE UP (ref 30–200)
CO2 SERPL-SCNC: 26 MMOL/L — SIGNIFICANT CHANGE UP (ref 22–29)
CREAT SERPL-MCNC: 0.83 MG/DL — SIGNIFICANT CHANGE UP (ref 0.5–1.3)
CULTURE RESULTS: SIGNIFICANT CHANGE UP
CULTURE RESULTS: SIGNIFICANT CHANGE UP
GLUCOSE SERPL-MCNC: 105 MG/DL — SIGNIFICANT CHANGE UP (ref 70–115)
HCT VFR BLD CALC: 38.6 % — LOW (ref 42–52)
HGB BLD-MCNC: 13.2 G/DL — LOW (ref 14–18)
INR BLD: 1.23 RATIO — HIGH (ref 0.88–1.16)
MCHC RBC-ENTMCNC: 29.9 PG — SIGNIFICANT CHANGE UP (ref 27–31)
MCHC RBC-ENTMCNC: 34.2 G/DL — SIGNIFICANT CHANGE UP (ref 32–36)
MCV RBC AUTO: 87.3 FL — SIGNIFICANT CHANGE UP (ref 80–94)
PLATELET # BLD AUTO: 211 K/UL — SIGNIFICANT CHANGE UP (ref 150–400)
POTASSIUM SERPL-MCNC: 3.1 MMOL/L — LOW (ref 3.5–5.3)
POTASSIUM SERPL-SCNC: 3.1 MMOL/L — LOW (ref 3.5–5.3)
PROT SERPL-MCNC: 6.2 G/DL — LOW (ref 6.6–8.7)
PROTHROM AB SERPL-ACNC: 13.6 SEC — HIGH (ref 9.8–12.7)
RBC # BLD: 4.42 M/UL — LOW (ref 4.6–6.2)
RBC # FLD: 13.8 % — SIGNIFICANT CHANGE UP (ref 11–15.6)
SODIUM SERPL-SCNC: 143 MMOL/L — SIGNIFICANT CHANGE UP (ref 135–145)
SPECIMEN SOURCE: SIGNIFICANT CHANGE UP
SPECIMEN SOURCE: SIGNIFICANT CHANGE UP
WBC # BLD: 9.3 K/UL — SIGNIFICANT CHANGE UP (ref 4.8–10.8)
WBC # FLD AUTO: 9.3 K/UL — SIGNIFICANT CHANGE UP (ref 4.8–10.8)

## 2017-09-04 PROCEDURE — 99233 SBSQ HOSP IP/OBS HIGH 50: CPT

## 2017-09-04 RX ORDER — POTASSIUM CHLORIDE 20 MEQ
40 PACKET (EA) ORAL ONCE
Qty: 0 | Refills: 0 | Status: COMPLETED | OUTPATIENT
Start: 2017-09-04 | End: 2017-09-04

## 2017-09-04 RX ORDER — POTASSIUM CHLORIDE 20 MEQ
40 PACKET (EA) ORAL EVERY 4 HOURS
Qty: 0 | Refills: 0 | Status: DISCONTINUED | OUTPATIENT
Start: 2017-09-04 | End: 2017-09-04

## 2017-09-04 RX ADMIN — Medication 100 MILLIGRAM(S): at 06:07

## 2017-09-04 RX ADMIN — Medication 100 MILLIGRAM(S): at 21:34

## 2017-09-04 RX ADMIN — Medication 100 MILLIGRAM(S): at 14:54

## 2017-09-04 RX ADMIN — Medication 400 MILLIGRAM(S): at 02:45

## 2017-09-04 RX ADMIN — Medication 400 MILLIGRAM(S): at 12:41

## 2017-09-04 RX ADMIN — GABAPENTIN 300 MILLIGRAM(S): 400 CAPSULE ORAL at 18:32

## 2017-09-04 RX ADMIN — Medication 400 MILLIGRAM(S): at 13:10

## 2017-09-04 RX ADMIN — Medication 25 MILLIGRAM(S): at 18:33

## 2017-09-04 RX ADMIN — Medication 25 MILLIGRAM(S): at 06:08

## 2017-09-04 RX ADMIN — Medication 400 MILLIGRAM(S): at 02:31

## 2017-09-04 RX ADMIN — Medication 102 MILLIGRAM(S): at 18:34

## 2017-09-04 RX ADMIN — Medication 40 MILLIEQUIVALENT(S): at 19:40

## 2017-09-04 RX ADMIN — PANTOPRAZOLE SODIUM 40 MILLIGRAM(S): 20 TABLET, DELAYED RELEASE ORAL at 06:08

## 2017-09-04 RX ADMIN — Medication 40 MILLIEQUIVALENT(S): at 21:34

## 2017-09-04 RX ADMIN — Medication 102 MILLIGRAM(S): at 06:08

## 2017-09-04 RX ADMIN — LOSARTAN POTASSIUM 25 MILLIGRAM(S): 100 TABLET, FILM COATED ORAL at 06:08

## 2017-09-04 RX ADMIN — ENOXAPARIN SODIUM 40 MILLIGRAM(S): 100 INJECTION SUBCUTANEOUS at 21:35

## 2017-09-04 RX ADMIN — GABAPENTIN 300 MILLIGRAM(S): 400 CAPSULE ORAL at 06:08

## 2017-09-04 NOTE — PROGRESS NOTE ADULT - PROBLEM SELECTOR PROBLEM 1
Acute respiratory failure with hypoxia
Acute respiratory failure with hypoxia
Septic shock
Septic shock
Cardiogenic shock
Septic shock

## 2017-09-04 NOTE — PROGRESS NOTE ADULT - SUBJECTIVE AND OBJECTIVE BOX
KIMMY GRANT    [id number]    57y      Male    Patient is a 57y old  Male who presents with a chief complaint of Unresponsive (30 Aug 2017 11:13)      INTERVAL HPI/OVERNIGHT EVENTS:    Patient is feeling some improvement, still have some SOB, denies fever, chills, chest pain  REVIEW OF SYSTEMS:    CONSTITUTIONAL: No fever, some fatigue  RESPIRATORY: No cough, has shortness of breath but better then before.  CARDIOVASCULAR: No chest pain, palpitations  GASTROINTESTINAL: No abdominal, No nausea, vomiting  NEUROLOGICAL: No headaches,  loss of strength.  MISCELLANEOUS: Hx of back pain       Vital Signs Last 24 Hrs  T(C): 37.5 (04 Sep 2017 10:05), Max: 37.5 (04 Sep 2017 10:05)  T(F): 99.5 (04 Sep 2017 10:05), Max: 99.5 (04 Sep 2017 10:05)  HR: 86 (04 Sep 2017 10:05) (70 - 86)  BP: 172/93 (04 Sep 2017 10:05) (142/68 - 172/93)  RR: 18 (04 Sep 2017 10:05) (15 - 20)  SpO2: 96% (04 Sep 2017 10:05) (96% - 98%)    PHYSICAL EXAM:    GENERAL: Middle age male looking in mild respiratory distress   HEENT: PERRL, +EOMI  NECK: soft, Supple, No JVD,   CHEST/LUNG: Clear to auscultate bilaterally; No wheezing  HEART: S1S2+, Regular rate and rhythm; No murmurs  ABDOMEN: Soft, Nontender, Nondistended; Bowel sounds present  EXTREMITIES:  2+ Peripheral Pulses  SKIN: No rashes or lesions  NEURO: AAOX3, no focal deficits, no motor r sensory loss  PSYCH: Anxious mood      LABS:                  I&O's Summary    03 Sep 2017 07:01  -  04 Sep 2017 07:00  --------------------------------------------------------  IN: 1162 mL / OUT: 1700 mL / NET: -538 mL    04 Sep 2017 07:01  -  04 Sep 2017 13:34  --------------------------------------------------------  IN: 480 mL / OUT: 450 mL / NET: 30 mL        MEDICATIONS  (STANDING):  enoxaparin Injectable 40 milliGRAM(s) SubCutaneous daily  thiamine IVPB 200 milliGRAM(s) IV Intermittent every 12 hours  pantoprazole    Tablet 40 milliGRAM(s) Oral before breakfast  ceFAZolin   IVPB   IV Intermittent   ceFAZolin   IVPB 2000 milliGRAM(s) IV Intermittent every 8 hours  gabapentin 300 milliGRAM(s) Oral two times a day  metoprolol 25 milliGRAM(s) Oral two times a day  losartan 25 milliGRAM(s) Oral daily  lidocaine   Patch 1 Patch Transdermal daily    MEDICATIONS  (PRN):  ondansetron Injectable 4 milliGRAM(s) IV Push every 6 hours PRN Nausea and/or Vomiting  sodium chloride 0.65% Nasal 1 Spray(s) Both Nostrils every 4 hours PRN Nasal Congestion  ibuprofen  Tablet 400 milliGRAM(s) Oral three times a day PRN pain

## 2017-09-04 NOTE — PROGRESS NOTE ADULT - ASSESSMENT
the patient was seen and examined, lab reports and imaging studies reviewed and plan of care discussed with the MICU team.    57M with PMHx HTN, HLD, BIBA after being found in bed unresponsive with agonal breathing likely secondary to suboxone.  MSOF septic/cardiogenic shock,    CVS: Off pressors, added ARB for BP control           will need cath before discharge as per Cardiology           D/c hydrocortisone patient hypertensive  Resp: stable  Neuro: stable  ID: Continue cefazolin for Asp PNA  Renal: stable  GI: increased liver enzymes likely from shock. Plateaued and should start trending down. RUQ US has been ordered to evaluate. May need further w/u and evaluation depending on ;abs and RUQ results  DVT prophylaxis  PT working to ambulate patient  Transfer to telemetry
56 yo male, PMHx HTN, HLD, chronic back pain, BIBA after being found in bed, unresponsive with agonal respirations by his wife this morning. Hypoxemic respiratory failure with encephalopathy, lactemia and metabolic acidosis of unclear etiology, in the setting of hypothermia and hypotension, concerning for septic shock. Given incontinence, possible post-ictal secondary to seizure.            .
57M with PMHx HTN, HLD, BIBA after being found in bed unresponsive with agonal breathing secondary to suboxone induced.  MSOF septic/cardiogenic shock, VDRF likely secondary to aspiration pneumonitis, rhabdo, and lactic acidosis all resolving.  Pt continues to have improving NAM however with dwindling urine output overnight.
58 yo male, PMHx HTN, HLD, chronic back pain, BIBA after being found in bed, unresponsive with agonal respirations by his wife this morning. Hypoxemic respiratory failure with encephalopathy, lactemia and metabolic acidosis of unclear etiology, in the setting of hypothermia and hypotension, concerning for septic shock. Given incontinence, possible post-ictal secondary to seizure.            .
ARF, nonoliguric with hyperkalemia and lactic acidosis, ATN due to hypotension and IV contrast==> resolving  - continue current care  - monitor labs  - avoid further nephrotoxic agents
Resolved ATN==> non oliguric and stable  - monitor labs now that ARB restarted    Will no longer follow; please recall if needed
The patient is a 55 year old male with a history of hypertension and chronic back pain who was seen by his PMD and started on suboxone day before admission. After taking one dose he was found unresponsive by his wife in the morning. Admitted to the ICU for acute hypoxic/hypercapneic respiratory failure secondary to aspiration pneumonia. Treated for septic shock with pressors and iv antibiotics.
The patient is a 55 year old male with a history of hypertension and chronic back pain who was seen by his PMD and started on suboxone day before admission. After taking one dose he was found unresponsive by his wife in the morning. Admitted to the ICU for acute hypoxic/hypercapneic respiratory failure secondary to aspiration pneumonia. Treated for septic shock with pressors and iv antibiotics. Pt developed multi organ failure due to septic shock.
The patient is a 55 year old male with a history of hypertension and chronic back pain who was seen by his PMD and started on suboxone day before admission. After taking one dose he was found unresponsive by his wife in the morning. Admitted to the ICU for acute hypoxic/hypercapneic respiratory failure secondary to aspiration pneumonia. Treated for septic shock with pressors and iv antibiotics. Pt developed multi organ failure due to septic shock.
Wen resolved; creat 0.86  Supplement KCL  LFTs sl worse - to watch ; on Mucomyst  d/w Els

## 2017-09-04 NOTE — PROGRESS NOTE ADULT - PROBLEM SELECTOR PLAN 1
Secondary to aspiration pneumonia - improving, Sputum culture + e coli- on ancef - today is last day, Blood cultures negative x 2, septic shock resolved.

## 2017-09-04 NOTE — PROGRESS NOTE ADULT - SUBJECTIVE AND OBJECTIVE BOX
[facility], THE [facility]ART [facility]                                   [address] [place] [address]                                                      PHONE: [phone]                                                         FAX: [phone]  -------------------------------------------------------------------------------------------------------------------------------    Pt seen and examined. FU for CMP    Overnight events/patient complaints: Pt breathing better. Ambulating. Frustrated with too many blood draws.    Vital Signs Last 24 Hrs  T(C): 37.2 (04 Sep 2017 06:02), Max: 37.2 (04 Sep 2017 06:02)  T(F): 98.9 (04 Sep 2017 06:02), Max: 98.9 (04 Sep 2017 06:02)  HR: 73 (04 Sep 2017 06:02) (70 - 86)  BP: 154/72 (04 Sep 2017 06:02) (142/68 - 160/80)  BP(mean): --  RR: 15 (04 Sep 2017 06:02) (15 - 20)  SpO2: 96% (04 Sep 2017 06:02) (96% - 98%)  I&O's Summary    03 Sep 2017 07:01  -  04 Sep 2017 07:00  --------------------------------------------------------  IN: 1162 mL / OUT: 1700 mL / NET: -538 mL    04 Sep 2017 07:01  -  04 Sep 2017 09:28  --------------------------------------------------------  IN: 240 mL / OUT: 450 mL / NET: -210 mL        PHYSICAL EXAM:  Constitutional: Oriented to time, place and person. Appears well developed, well nourished; alert and co-operative  HEENT:     Conjunctiva normal, Normal oral mucosa, No JVD	  Cardiovascular: Normal S1 S2, No murmurs  Respiratory: Lungs clear to auscultation; no crepitations, no wheeze  Gastrointestinal:  Soft, Non-tender, + BS	  Extremities: No cyanosis, clubbing or edema  Skin: Warm and dry  Neurologic: Alert oriented to time place and person  Psychiatric: affect was normal        LABS:    RADIOLOGY & ADDITIONAL STUDIES:    CARDIOLOGY TESTING:     Telemetry: No arrhythmias    ECHO:  Summary:   1. Left ventricular ejection fraction, by visual estimation, is 40%.   2. Technically difficult study.   3. Mildly to moderately decreased global left ventricular systolic   function.   4. Apical septal segment, apical lateral segment, and apex are abnormal   as described above.   5. Abnormal septal motion consistent with left bundle branch block.   6. Spectral Doppler shows impaired relaxation pattern of left   ventricular myocardial filling (Grade I diastolic dysfunction).   7. LV was imaged on ionotropic support.   8. Severely enlarged right ventricle.   9. Moderately reduced RV systolic function.  10. Due to Rv dilatation TR jet may underestimate pulmonary pressure.  11. Mildly dilated right atrium.  12. There is no evidence of pericardial effusion.  13. Mild mitral annular calcification.  14. Mild mitral valve regurgitation.  15. Thickening of the anterior and posterior mitral valve leaflets.  16. Sclerotic aortic valve with normal opening.  17. Endocardial visualization was enhanced with intravenous echo contrast.      STRESS TEST: normal perfusion 2015    MEDICATIONS:  MEDICATIONS  (STANDING):  enoxaparin Injectable 40 milliGRAM(s) SubCutaneous daily  thiamine IVPB 200 milliGRAM(s) IV Intermittent every 12 hours  pantoprazole    Tablet 40 milliGRAM(s) Oral before breakfast  ceFAZolin   IVPB   IV Intermittent   ceFAZolin   IVPB 2000 milliGRAM(s) IV Intermittent every 8 hours  gabapentin 300 milliGRAM(s) Oral two times a day  metoprolol 25 milliGRAM(s) Oral two times a day  losartan 25 milliGRAM(s) Oral daily  lidocaine   Patch 1 Patch Transdermal daily    MEDICATIONS  (PRN):  ondansetron Injectable 4 milliGRAM(s) IV Push every 6 hours PRN Nausea and/or Vomiting  sodium chloride 0.65% Nasal 1 Spray(s) Both Nostrils every 4 hours PRN Nasal Congestion  ibuprofen  Tablet 400 milliGRAM(s) Oral three times a day PRN pain      ASSESSMENT AND PLAN:    57y Male with past medical history significant for HTN, HLD, nonobstructive CAD on cath 2012, SPECT 1/15 normal perfusion without any evidence of ischemia, ex smoker; ECHO 1/15:EF 50-55% no significant valvular dz admitted after taking suboxone x 1 dose and then was found unresponsive on his back for possibly several hours and agonal breathing.  He was intubated and successfully extubated. Echo shows mild LV dysfunction likely stress induced    -  Overall blood work stable except increasing ALT. Pt declines blood work due to difficult draws.  -  Repeat ECHO to re-evaluate LV function   -  Pt declines cardiac cath at this time. Will revisit pending repeat Echo  -  Continue metoprolol with losartan  -  Work-up for RYAN as outpt. Combination of suboxone with RYAN possible etiology of presentation,

## 2017-09-04 NOTE — PROGRESS NOTE ADULT - PROBLEM SELECTOR PLAN 5
aggressively replaced Ca2+ and f/u repeat electrolyte levels.
aggressively replaced Ca2+ and f/u repeat electrolyte levels.
likely secondary to shock liver   hold statin  abdominal ultrasound   avoid hepatotoxic medication  hepatitis panel   f/u LFT, if trending up, consider GI
likely secondary to shock liver, hold statin  abdominal ultrasound  showed Hepatic steatosis, unchanged, Biliary and pancreatic duct prominence, new and of uncertain etiology.  avoid hepatotoxic medication,  patient AST is improving, ALT is up, refusing for labs today as his arms are swelling, will monitor the level and will get hepatitis panel, will consider GI if not improving.
likely secondary to shock  improving  hold statin  abdominal ultrasound ordered

## 2017-09-04 NOTE — PROGRESS NOTE ADULT - PROBLEM SELECTOR PROBLEM 4
Encephalopathy
Encephalopathy
NAM (acute kidney injury)
NAM (acute kidney injury)
Sepsis, due to unspecified organism
NAM (acute kidney injury)

## 2017-09-04 NOTE — PROGRESS NOTE ADULT - PROBLEM SELECTOR PLAN 3
Echocardiogram- ef of 40-45% with systolic dysfunction possibly secondary to septic shock, Nuclear stress test earlier this year was normal.   c/w Metoprolol   per cardiology repeat TTE, cath later this week, patient is refusing for cardiac cath, will follow, patient is also on losartan.

## 2017-09-04 NOTE — PROGRESS NOTE ADULT - PROBLEM SELECTOR PROBLEM 3
Cardiogenic shock
NAM (acute kidney injury)
Cardiogenic shock

## 2017-09-04 NOTE — PROGRESS NOTE ADULT - PROBLEM SELECTOR PROBLEM 2
Acute respiratory failure with hypoxia
Acute respiratory failure with hypoxia
Septic shock
Acute respiratory failure with hypoxia

## 2017-09-05 ENCOUNTER — TRANSCRIPTION ENCOUNTER (OUTPATIENT)
Age: 57
End: 2017-09-05

## 2017-09-05 VITALS
HEART RATE: 88 BPM | OXYGEN SATURATION: 96 % | SYSTOLIC BLOOD PRESSURE: 164 MMHG | DIASTOLIC BLOOD PRESSURE: 90 MMHG | RESPIRATION RATE: 19 BRPM

## 2017-09-05 PROBLEM — E78.00 PURE HYPERCHOLESTEROLEMIA, UNSPECIFIED: Chronic | Status: ACTIVE | Noted: 2017-08-30

## 2017-09-05 PROBLEM — I10 ESSENTIAL (PRIMARY) HYPERTENSION: Chronic | Status: ACTIVE | Noted: 2017-08-30

## 2017-09-05 PROCEDURE — 87899 AGENT NOS ASSAY W/OPTIC: CPT

## 2017-09-05 PROCEDURE — 51702 INSERT TEMP BLADDER CATH: CPT | Mod: XU

## 2017-09-05 PROCEDURE — 94660 CPAP INITIATION&MGMT: CPT

## 2017-09-05 PROCEDURE — 85730 THROMBOPLASTIN TIME PARTIAL: CPT

## 2017-09-05 PROCEDURE — 82947 ASSAY GLUCOSE BLOOD QUANT: CPT

## 2017-09-05 PROCEDURE — 93005 ELECTROCARDIOGRAM TRACING: CPT

## 2017-09-05 PROCEDURE — 76700 US EXAM ABDOM COMPLETE: CPT

## 2017-09-05 PROCEDURE — 99291 CRITICAL CARE FIRST HOUR: CPT | Mod: 25

## 2017-09-05 PROCEDURE — 85014 HEMATOCRIT: CPT

## 2017-09-05 PROCEDURE — 82962 GLUCOSE BLOOD TEST: CPT

## 2017-09-05 PROCEDURE — 86900 BLOOD TYPING SEROLOGIC ABO: CPT

## 2017-09-05 PROCEDURE — 84100 ASSAY OF PHOSPHORUS: CPT

## 2017-09-05 PROCEDURE — 83036 HEMOGLOBIN GLYCOSYLATED A1C: CPT

## 2017-09-05 PROCEDURE — 80053 COMPREHEN METABOLIC PANEL: CPT

## 2017-09-05 PROCEDURE — 84146 ASSAY OF PROLACTIN: CPT

## 2017-09-05 PROCEDURE — 87070 CULTURE OTHR SPECIMN AEROBIC: CPT

## 2017-09-05 PROCEDURE — 82803 BLOOD GASES ANY COMBINATION: CPT

## 2017-09-05 PROCEDURE — 96374 THER/PROPH/DIAG INJ IV PUSH: CPT | Mod: XU

## 2017-09-05 PROCEDURE — 85027 COMPLETE CBC AUTOMATED: CPT

## 2017-09-05 PROCEDURE — 83930 ASSAY OF BLOOD OSMOLALITY: CPT

## 2017-09-05 PROCEDURE — 93308 TTE F-UP OR LMTD: CPT

## 2017-09-05 PROCEDURE — 93306 TTE W/DOPPLER COMPLETE: CPT

## 2017-09-05 PROCEDURE — 93970 EXTREMITY STUDY: CPT

## 2017-09-05 PROCEDURE — 36415 COLL VENOUS BLD VENIPUNCTURE: CPT

## 2017-09-05 PROCEDURE — 87186 SC STD MICRODIL/AGAR DIL: CPT

## 2017-09-05 PROCEDURE — 85610 PROTHROMBIN TIME: CPT

## 2017-09-05 PROCEDURE — 80307 DRUG TEST PRSMV CHEM ANLYZR: CPT

## 2017-09-05 PROCEDURE — 97163 PT EVAL HIGH COMPLEX 45 MIN: CPT

## 2017-09-05 PROCEDURE — 82553 CREATINE MB FRACTION: CPT

## 2017-09-05 PROCEDURE — 97116 GAIT TRAINING THERAPY: CPT

## 2017-09-05 PROCEDURE — 97110 THERAPEUTIC EXERCISES: CPT

## 2017-09-05 PROCEDURE — 36600 WITHDRAWAL OF ARTERIAL BLOOD: CPT

## 2017-09-05 PROCEDURE — 94003 VENT MGMT INPAT SUBQ DAY: CPT

## 2017-09-05 PROCEDURE — 71045 X-RAY EXAM CHEST 1 VIEW: CPT

## 2017-09-05 PROCEDURE — 86850 RBC ANTIBODY SCREEN: CPT

## 2017-09-05 PROCEDURE — 84145 PROCALCITONIN (PCT): CPT

## 2017-09-05 PROCEDURE — 31500 INSERT EMERGENCY AIRWAY: CPT

## 2017-09-05 PROCEDURE — 84295 ASSAY OF SERUM SODIUM: CPT

## 2017-09-05 PROCEDURE — 82550 ASSAY OF CK (CPK): CPT

## 2017-09-05 PROCEDURE — 86901 BLOOD TYPING SEROLOGIC RH(D): CPT

## 2017-09-05 PROCEDURE — 84484 ASSAY OF TROPONIN QUANT: CPT

## 2017-09-05 PROCEDURE — 94002 VENT MGMT INPAT INIT DAY: CPT

## 2017-09-05 PROCEDURE — 99239 HOSP IP/OBS DSCHRG MGMT >30: CPT

## 2017-09-05 PROCEDURE — 81001 URINALYSIS AUTO W/SCOPE: CPT

## 2017-09-05 PROCEDURE — 80048 BASIC METABOLIC PNL TOTAL CA: CPT

## 2017-09-05 PROCEDURE — 83935 ASSAY OF URINE OSMOLALITY: CPT

## 2017-09-05 PROCEDURE — 71275 CT ANGIOGRAPHY CHEST: CPT

## 2017-09-05 PROCEDURE — 87040 BLOOD CULTURE FOR BACTERIA: CPT

## 2017-09-05 PROCEDURE — 82435 ASSAY OF BLOOD CHLORIDE: CPT

## 2017-09-05 PROCEDURE — 70450 CT HEAD/BRAIN W/O DYE: CPT

## 2017-09-05 PROCEDURE — 74174 CTA ABD&PLVS W/CONTRAST: CPT

## 2017-09-05 PROCEDURE — 84132 ASSAY OF SERUM POTASSIUM: CPT

## 2017-09-05 PROCEDURE — 82330 ASSAY OF CALCIUM: CPT

## 2017-09-05 PROCEDURE — 83605 ASSAY OF LACTIC ACID: CPT

## 2017-09-05 PROCEDURE — 84443 ASSAY THYROID STIM HORMONE: CPT

## 2017-09-05 PROCEDURE — 99231 SBSQ HOSP IP/OBS SF/LOW 25: CPT

## 2017-09-05 PROCEDURE — 87449 NOS EACH ORGANISM AG IA: CPT

## 2017-09-05 PROCEDURE — 80074 ACUTE HEPATITIS PANEL: CPT

## 2017-09-05 PROCEDURE — 83735 ASSAY OF MAGNESIUM: CPT

## 2017-09-05 RX ORDER — LOSARTAN POTASSIUM 100 MG/1
0 TABLET, FILM COATED ORAL
Qty: 0 | Refills: 0 | COMMUNITY

## 2017-09-05 RX ORDER — LIDOCAINE 4 G/100G
1 CREAM TOPICAL
Qty: 30 | Refills: 0
Start: 2017-09-05 | End: 2017-10-05

## 2017-09-05 RX ORDER — METOPROLOL TARTRATE 50 MG
1 TABLET ORAL
Qty: 60 | Refills: 0
Start: 2017-09-05 | End: 2017-10-05

## 2017-09-05 RX ORDER — GABAPENTIN 400 MG/1
1 CAPSULE ORAL
Qty: 60 | Refills: 0
Start: 2017-09-05 | End: 2017-10-05

## 2017-09-05 RX ORDER — LOSARTAN POTASSIUM 100 MG/1
1 TABLET, FILM COATED ORAL
Qty: 30 | Refills: 1
Start: 2017-09-05 | End: 2017-11-03

## 2017-09-05 RX ORDER — PANTOPRAZOLE SODIUM 20 MG/1
1 TABLET, DELAYED RELEASE ORAL
Qty: 0 | Refills: 0 | DISCHARGE
Start: 2017-09-05

## 2017-09-05 RX ORDER — HYDRALAZINE HCL 50 MG
10 TABLET ORAL ONCE
Qty: 0 | Refills: 0 | Status: COMPLETED | OUTPATIENT
Start: 2017-09-05 | End: 2017-09-05

## 2017-09-05 RX ORDER — LOVASTATIN 20 MG
0 TABLET ORAL
Qty: 0 | Refills: 0 | COMMUNITY

## 2017-09-05 RX ORDER — GABAPENTIN 400 MG/1
1 CAPSULE ORAL
Qty: 0 | Refills: 0 | DISCHARGE
Start: 2017-09-05 | End: 2017-10-05

## 2017-09-05 RX ORDER — IBUPROFEN 200 MG
1 TABLET ORAL
Qty: 0 | Refills: 0 | DISCHARGE
Start: 2017-09-05

## 2017-09-05 RX ORDER — FLUOXETINE HCL 10 MG
1 CAPSULE ORAL
Qty: 30 | Refills: 0
Start: 2017-09-05 | End: 2017-10-05

## 2017-09-05 RX ORDER — FLUOXETINE HCL 10 MG
0 CAPSULE ORAL
Qty: 0 | Refills: 0 | COMMUNITY

## 2017-09-05 RX ORDER — ASPIRIN/CALCIUM CARB/MAGNESIUM 324 MG
1 TABLET ORAL
Qty: 30 | Refills: 0
Start: 2017-09-05 | End: 2017-10-05

## 2017-09-05 RX ADMIN — Medication 10 MILLIGRAM(S): at 01:57

## 2017-09-05 RX ADMIN — GABAPENTIN 300 MILLIGRAM(S): 400 CAPSULE ORAL at 06:08

## 2017-09-05 RX ADMIN — Medication 25 MILLIGRAM(S): at 06:09

## 2017-09-05 RX ADMIN — Medication 400 MILLIGRAM(S): at 12:28

## 2017-09-05 RX ADMIN — Medication 400 MILLIGRAM(S): at 13:06

## 2017-09-05 RX ADMIN — GABAPENTIN 300 MILLIGRAM(S): 400 CAPSULE ORAL at 18:11

## 2017-09-05 RX ADMIN — Medication 25 MILLIGRAM(S): at 18:11

## 2017-09-05 RX ADMIN — LOSARTAN POTASSIUM 25 MILLIGRAM(S): 100 TABLET, FILM COATED ORAL at 06:09

## 2017-09-05 RX ADMIN — Medication 102 MILLIGRAM(S): at 06:09

## 2017-09-05 RX ADMIN — PANTOPRAZOLE SODIUM 40 MILLIGRAM(S): 20 TABLET, DELAYED RELEASE ORAL at 06:09

## 2017-09-05 NOTE — DISCHARGE NOTE ADULT - SECONDARY DIAGNOSIS.
Metabolic acidosis Hypokalemia Acute respiratory failure with hypoxia Elevated liver enzymes NAM (acute kidney injury) Altered mental status

## 2017-09-05 NOTE — DISCHARGE NOTE ADULT - ADDITIONAL INSTRUCTIONS
Abdominal ultrasound done and showed Hepatic steatosis, unchanged, Biliary and pancreatic duct prominence, new and of uncertain etiology, need repeat US of abdomen in 2 weeks.   Please get your CMP done, if better your PCP can resume cholesterol medication

## 2017-09-05 NOTE — DISCHARGE NOTE ADULT - CARE PROVIDER_API CALL
Dr Singh,   PMD in 1 week, please ask him to get your CMP done  Phone: (   )    -  Fax: (   )    -    Dr Bassem Stockton,   Cardiologist in 2 weeks, please call his office and get an appiontment.  Phone: (   )    -  Fax: (   )    -    Dr Gamble,   Tomorrow as Scheduled.  Phone: (   )    -  Fax: (   )    -

## 2017-09-05 NOTE — PROGRESS NOTE ADULT - SUBJECTIVE AND OBJECTIVE BOX
PA note     Called by nurse patient sleeping and desaturated down to 80% SPO2 and was difficult to arouse once he woke he c/o chest tightness. Supplemental O2 given with quick improvement in SPO2, now 96%, and chest tightness resolved. Patient admits to history of being diagnosed with RYAN but never follow up.     A: likely hypoxia secondary to RYAN  without formal titration sleep study.    P: CPAP 8 cmh2o titrate FIO2 for SPO2 93-97%      d/W Dr Tito pérez ign out for pulmonary consult in the morning. Patient report seeing Dr Baptiste

## 2017-09-05 NOTE — DISCHARGE NOTE ADULT - PLAN OF CARE
resolved Follow up with PCP take banana a day, get your CMP checked by PCP in 1 week resolved, Follow up with Dr Gamble at pulmonary clinic tomorrow as scheduled Get your CMP check in 1 week, don't take your cholestrole medication for now untill your liver enzymes are better.

## 2017-09-05 NOTE — PROGRESS NOTE ADULT - SUBJECTIVE AND OBJECTIVE BOX
[facility] - [facility], THE [facility]                                   [address] [place] [address]                                                      PHONE: [phone]                                                         FAX: [phone]  [email or web address]  ---------------------------------------------------------------------------------------------------------------------------------    Overnight events/patient complaints:  NAD feeling well     No Known Allergies    MEDICATIONS  (STANDING):  enoxaparin Injectable 40 milliGRAM(s) SubCutaneous daily  thiamine IVPB 200 milliGRAM(s) IV Intermittent every 12 hours  pantoprazole    Tablet 40 milliGRAM(s) Oral before breakfast  gabapentin 300 milliGRAM(s) Oral two times a day  metoprolol 25 milliGRAM(s) Oral two times a day  losartan 25 milliGRAM(s) Oral daily  lidocaine   Patch 1 Patch Transdermal daily    MEDICATIONS  (PRN):  ondansetron Injectable 4 milliGRAM(s) IV Push every 6 hours PRN Nausea and/or Vomiting  sodium chloride 0.65% Nasal 1 Spray(s) Both Nostrils every 4 hours PRN Nasal Congestion  ibuprofen  Tablet 400 milliGRAM(s) Oral three times a day PRN pain      Vital Signs Last 24 Hrs  T(C): 36.4 (05 Sep 2017 06:01), Max: 37.5 (04 Sep 2017 10:05)  T(F): 97.5 (05 Sep 2017 06:01), Max: 99.5 (04 Sep 2017 10:05)  HR: 95 (05 Sep 2017 06:01) (76 - 95)  BP: 142/80 (05 Sep 2017 07:17) (142/80 - 186/94)  BP(mean): --  RR: 20 (05 Sep 2017 06:01) (18 - 20)  SpO2: 95% (05 Sep 2017 06:01) (93% - 100%)  ICU Vital Signs Last 24 Hrs  KIMMY GRANT  I&O's Detail    04 Sep 2017 07:01  -  05 Sep 2017 07:00  --------------------------------------------------------  IN:    Oral Fluid: 840 mL    Solution: 50 mL    Solution: 100 mL  Total IN: 990 mL    OUT:    Voided: 2825 mL  Total OUT: 2825 mL    Total NET: -1835 mL        I&O's Summary    04 Sep 2017 07:01  -  05 Sep 2017 07:00  --------------------------------------------------------  IN: 990 mL / OUT: 2825 mL / NET: -1835 mL      Drug Dosing Weight  KIMMY GRANT      PHYSICAL EXAM:  General: Appears well developed, well nourished alert and cooperative.  HEENT: Head; normocephalic, atraumatic.  Eyes: Pupils reactive, cornea wnl.  Neck: Supple, no nodes adenopathy, no NVD or carotid bruit or thyromegaly.  CARDIOVASCULAR: Normal S1 and S2, No murmur, rub, gallop or lift.   LUNGS: No rales, rhonchi or wheeze. Normal breath sounds bilaterally.  ABDOMEN: Soft, nontender without mass or organomegaly. bowel sounds normoactive.  EXTREMITIES: No clubbing, cyanosis or edema. Distal pulses wnl.   SKIN: warm and dry with normal turgor.  NEURO: Alert/oriented x 3/normal motor exam. No pathologic reflexes.    PSYCH: normal affect.        LABS:                        13.2   9.3   )-----------( 211      ( 04 Sep 2017 14:59 )             38.6     09-04    143  |  102  |  18.0  ----------------------------<  105  3.1<L>   |  26.0  |  0.83    Ca    8.6      04 Sep 2017 14:59    TPro  6.2<L>  /  Alb  3.2<L>  /  TBili  0.6  /  DBili  x   /  AST  78<H>  /  ALT  398<H>  /  AlkPhos  92  09-04    KIMMY GRANT  CARDIAC MARKERS ( 04 Sep 2017 14:59 )  x     / x     / 93 U/L / x     / x          PT/INR - ( 04 Sep 2017 14:59 )   PT: 13.6 sec;   INR: 1.23 ratio               RADIOLOGY & ADDITIONAL STUDIES:    INTERPRETATION OF TELEMETRY (personally reviewed):    ECHO:  Summary:   1. Left ventricular ejection fraction, by visual estimation, is 40%.   2. Technically difficult study.   3. Mildly to moderately decreased global left ventricular systolic   function.   4. Apical septal segment, apical lateral segment, and apex are abnormal   as described above.   5. Abnormal septal motion consistent with left bundle branch block.   6. Spectral Doppler shows impaired relaxation pattern of left   ventricular myocardial filling (Grade I diastolic dysfunction).   7. LV was imaged on ionotropic support.   8. Severely enlarged right ventricle.   9. Moderately reduced RV systolic function.  10. Due to Rv dilatation TR jet may underestimate pulmonary pressure.  11. Mildly dilated right atrium.  12. There is no evidence of pericardial effusion.  13. Mild mitral annular calcification.  14. Mild mitral valve regurgitation.  15. Thickening of the anterior and posterior mitral valve leaflets.  16. Sclerotic aortic valve with normal opening.  17. Endocardial visualization was enhanced with intravenous echo contrast.      STRESS TEST: normal perfusion 2015    CARDIAC CATHETERIZATION: pending      Assessment and Plan:  In summary, KIMMY GRANT is an 57y Male with past medical history significant for HTN, HLD, nonobstructive CAD on cath 2012, SPECT 1/15 normal perfusion without any evidence of ischemia, ex smoker; ECHO 1/15:EF 50-55% no significant valvular dz admitted after taking suboxone x 1 dose and then was found unresponsive on his back for possibly several hours and agonal breathing.  He was intubated and successfully extubated.  He originally required levo, ministerio and was persistently hypotensive now improved off pressor HD stable; mild LV dysfunction likely stress induce critically ill          1.  Repeat ECHO on today to re-evaluate LV function ? cath if still with LV dysfunction   3.  Continue current CV medications    4.  Continue TELE   4.  Out Patient RYAN work up

## 2017-09-05 NOTE — DISCHARGE NOTE ADULT - MEDICATION SUMMARY - MEDICATIONS TO TAKE
I will START or STAY ON the medications listed below when I get home from the hospital:    ibuprofen 400 mg oral tablet  -- 1 tab(s) by mouth 3 times a day, As needed, pain  -- Indication: For Pain in the back    aspirin 81 mg oral tablet  -- 1 tab(s) by mouth once a day  -- Take with food or milk.    -- Indication: For Prevention    losartan 25 mg oral tablet  -- 1 tab(s) by mouth once a day  -- Indication: For HTN     gabapentin 300 mg oral capsule  -- 1 cap(s) by mouth 2 times a day  -- Indication: For Pain     PROzac 10 mg oral capsule  -- 1 cap(s) by mouth once a day  -- It is very important that you take or use this exactly as directed.  Do not skip doses or discontinue unless directed by your doctor.  May cause drowsiness.  Alcohol may intensify this effect.  Use care when operating dangerous machinery.  Obtain medical advice before taking any non-prescription drugs as some may affect the action of this medication.    -- Indication: For Depression     metoprolol tartrate 25 mg oral tablet  -- 1 tab(s) by mouth 2 times a day  -- Indication: For HTN     lidocaine 5% topical film  -- Apply on skin to affected area once a day  -- Indication: For Pain     pantoprazole 40 mg oral delayed release tablet  -- 1 tab(s) by mouth once a day (before a meal)  -- Indication: For GERD I will START or STAY ON the medications listed below when I get home from the hospital:    Oxygen home fill and concentrator with refill system, 2 liter with nasal cannula at night time, lenth of need 99days   -- ICD 10: R09.02   -- Indication: For Hypoxemia at night     aspirin 81 mg oral tablet  -- 1 tab(s) by mouth once a day  -- Take with food or milk.    -- Indication: For Prevention    ibuprofen 400 mg oral tablet  -- 1 tab(s) by mouth 3 times a day, As needed, pain  -- Indication: For Pain in the back    losartan 25 mg oral tablet  -- 1 tab(s) by mouth once a day  -- Indication: For HTN     gabapentin 300 mg oral capsule  -- 1 cap(s) by mouth 2 times a day  -- Indication: For Pain     PROzac 10 mg oral capsule  -- 1 cap(s) by mouth once a day  -- It is very important that you take or use this exactly as directed.  Do not skip doses or discontinue unless directed by your doctor.  May cause drowsiness.  Alcohol may intensify this effect.  Use care when operating dangerous machinery.  Obtain medical advice before taking any non-prescription drugs as some may affect the action of this medication.    -- Indication: For Depression     metoprolol tartrate 25 mg oral tablet  -- 1 tab(s) by mouth 2 times a day  -- Indication: For HTN     lidocaine 5% topical film  -- Apply on skin to affected area once a day  -- Indication: For Pain     pantoprazole 40 mg oral delayed release tablet  -- 1 tab(s) by mouth once a day (before a meal)  -- Indication: For GERD

## 2017-09-05 NOTE — DISCHARGE NOTE ADULT - HOSPITAL COURSE
The patient is a 55 year old male with a history of hypertension and chronic back pain who was seen by his PMD and started on Suboxone day before admission. After taking one dose he was found unresponsive by his wife in the morning, he was brought in the hospital and was admitted to the ICU for acute hypoxic/hyper capneic respiratory failure secondary to aspiration pneumonia, septic shock, reated for septic shock with pressors and iv antibiotics, he also developed multi organ failure due to septic shock, over the course he was weaned off from the pressors, his septic shock resolved, Sputum culture + e coli- on ancef, Blood cultures negative x 2, patient competed course of antibiotics.   For his Acute respiratory failure with hypoxia., he was given duo nebs, supplemental oxygen, that was weaned off, patient's respiratory failure resolved over the course, he is saturating well on room, he was found to have hypoxemia at the night while sleeping, likely related to RYAN, he need to have out patient sleep study and has appointment with Dr Gamble tomorrow, mean while will give him script for the night time oxygen therapy, he was counselled about the weight reduction and exercise.   Patient was found to have multiorgan dysfunction in setting of shock, patient kidney function was monitored, started improving now WNL, there was some hypokalemia and was supplemented, patient need CMP in 1 week.   His Liver enzymes were elevated as well, his statins were held, his liver enzymes are much better but still trending down, will still hold his statins, check BMP in 1 week if normal, then statin can be resumed by PCP, abdominal ultrasound done and showed Hepatic steatosis, unchanged, Biliary and pancreatic duct prominence, new and of uncertain etiology, need repeat US of abdomen in 2 weeks.     Give patient presentation with low BP, patient got and echo showed of 40-45% with systolic dysfunction possibly secondary to septic shock, Nuclear stress test earlier this year was normal, patient was started on c/w Metoprolol, patient was seen and followed by cardiology and they repeated TTE after improvement of acute illness, LLUVIA repeated showed 1. Left ventricular ejection fraction, by visual estimation, is 70 to   75%, 2. Normal global left ventricular systolic function, looks like improved with improvement of acute illness, Talked with Dr Bassem Stockton, he will follow the patient as out patient, no further test at this time.   For the Hx  of chronic back pain. Plan, he was given lidocaine patch, motrin prn, he was counselled to avoid narcotics.     Vital Signs Last 24 Hrs  T(C): 37.2 (05 Sep 2017 10:00), Max: 37.5 (04 Sep 2017 21:30)  T(F): 98.9 (05 Sep 2017 10:00), Max: 99.5 (04 Sep 2017 21:30)  HR: 100 (05 Sep 2017 10:00) (76 - 100)  BP: 160/80 (05 Sep 2017 12:33) (142/80 - 186/94)  RR: 18 (05 Sep 2017 10:00) (18 - 20)  SpO2: 95% (05 Sep 2017 06:01) (93% - 100%)    PHYSICAL EXAM:    GENERAL: Middle age male looking comfortable   HEENT: PERRL, +EOMI  NECK: soft, Supple, No JVD,   CHEST/LUNG: Clear to auscultate bilaterally; No wheezing  HEART: S1S2+, Regular rate and rhythm; No murmurs  ABDOMEN: Soft, Nontender, Nondistended; Bowel sounds present  EXTREMITIES:  1+ Peripheral Pulses, No edema  SKIN: No rashes or lesions  NEURO: AAOX3, no focal deficits, no motor r sensory loss  PSYCH: normal mood    Total time spent 40 minutes The patient is a 55 year old male with a history of hypertension and chronic back pain who was seen by his PMD and started on Suboxone day before admission. After taking one dose he was found unresponsive by his wife in the morning, he was brought in the hospital and was admitted to the ICU for acute hypoxic/hyper capneic respiratory failure secondary to aspiration pneumonia, septic shock, reated for septic shock with pressors and iv antibiotics, he also developed multi organ failure due to septic shock, over the course he was weaned off from the pressors, his septic shock resolved, Sputum culture + e coli- on ancef, Blood cultures negative x 2, patient competed course of antibiotics.   For his Acute respiratory failure with hypoxia., he was given duo nebs, supplemental oxygen, that was weaned off, patient's respiratory failure resolved over the course, he is saturating well on room, he was found to have hypoxemia at the night while sleeping, his saturation went down to 80% on RA while sleep, likely related to RYAN, he need to have out patient sleep study and has appointment with Dr Gamble tomorrow, mean while will give him script for the night time oxygen therapy to use 2 liter oxygen with nasal cannula, and was told to maintain O2 sat above 92%, he was counselled about the weight reduction and exercise.   Patient was found to have multiorgan dysfunction in setting of shock, patient kidney function was monitored, started improving now WNL, there was some hypokalemia and was supplemented, patient need CMP in 1 week.   His Liver enzymes were elevated as well, his statins were held, his liver enzymes are much better but still trending down, will still hold his statins, check BMP in 1 week if normal, then statin can be resumed by PCP, abdominal ultrasound done and showed Hepatic steatosis, unchanged, Biliary and pancreatic duct prominence, new and of uncertain etiology, need repeat US of abdomen in 2 weeks.     Give patient presentation with low BP, patient got and echo showed of 40-45% with systolic dysfunction possibly secondary to septic shock, Nuclear stress test earlier this year was normal, patient was started on c/w Metoprolol, patient was seen and followed by cardiology and they repeated TTE after improvement of acute illness, LLUVIA repeated showed 1. Left ventricular ejection fraction, by visual estimation, is 70 to   75%, 2. Normal global left ventricular systolic function, looks like improved with improvement of acute illness, Talked with Dr Bassem Stockton, he will follow the patient as out patient, no further test at this time.   For the Hx  of chronic back pain. Plan, he was given lidocaine patch, motrin prn, he was counselled to avoid narcotics.     Vital Signs Last 24 Hrs  T(C): 37.2 (05 Sep 2017 10:00), Max: 37.5 (04 Sep 2017 21:30)  T(F): 98.9 (05 Sep 2017 10:00), Max: 99.5 (04 Sep 2017 21:30)  HR: 100 (05 Sep 2017 10:00) (76 - 100)  BP: 160/80 (05 Sep 2017 12:33) (142/80 - 186/94)  RR: 18 (05 Sep 2017 10:00) (18 - 20)  SpO2: 95% (05 Sep 2017 06:01) (93% - 100%)    PHYSICAL EXAM:    GENERAL: Middle age male looking comfortable   HEENT: PERRL, +EOMI  NECK: soft, Supple, No JVD,   CHEST/LUNG: Clear to auscultate bilaterally; No wheezing  HEART: S1S2+, Regular rate and rhythm; No murmurs  ABDOMEN: Soft, Nontender, Nondistended; Bowel sounds present  EXTREMITIES:  1+ Peripheral Pulses, No edema  SKIN: No rashes or lesions  NEURO: AAOX3, no focal deficits, no motor r sensory loss  PSYCH: normal mood    Total time spent 40 minutes The patient is a 55 year old male with a history of hypertension and chronic back pain who was seen by his PMD and started on Suboxone day before admission. After taking one dose he was found unresponsive by his wife in the morning, he was brought in the hospital and was admitted to the ICU for acute hypoxic/hyper capneic respiratory failure secondary to aspiration pneumonia, septic shock, reated for septic shock with pressors and iv antibiotics, he also developed multi organ failure due to septic shock, over the course he was weaned off from the pressors, his septic shock resolved, Sputum culture + e coli- on ancef, Blood cultures negative x 2, patient competed course of antibiotics.   For his Acute respiratory failure with hypoxia., he was given duo nebs, supplemental oxygen, that was weaned off, patient's respiratory failure resolved over the course, he is saturating well on room, he was found to have hypoxemia at the night while sleeping, his saturation went down to 80% on RA while sleep, likely related to RYAN, his Echocardiogram repeated is better, his lung exam also shows no crackles are wheezing, there is not other likely reason then sleep apnea, he need to have out patient sleep study and has appointment with Dr Zaman tomorrow at 10am, who will further evaluate him and will get his sleep study done, mean while will give him script for the night time oxygen therapy to use 2 liter oxygen with nasal cannula, and was told to maintain O2 sat above 92%, he was counselled about the weight reduction and exercise.      Patient was found to have multiorgan dysfunction in setting of shock, patient kidney function was monitored, started improving now WNL, there was some hypokalemia and was supplemented, patient need CMP in 1 week.   His Liver enzymes were elevated as well, his statins were held, his liver enzymes are much better but still trending down, will still hold his statins, check BMP in 1 week if normal, then statin can be resumed by PCP, abdominal ultrasound done and showed Hepatic steatosis, unchanged, Biliary and pancreatic duct prominence, new and of uncertain etiology, need repeat US of abdomen in 2 weeks.     Give patient presentation with low BP, patient got and echo showed of 40-45% with systolic dysfunction possibly secondary to septic shock, Nuclear stress test earlier this year was normal, patient was started on c/w Metoprolol, patient was seen and followed by cardiology and they repeated TTE after improvement of acute illness, LLUVIA repeated showed 1. Left ventricular ejection fraction, by visual estimation, is 70 to   75%, 2. Normal global left ventricular systolic function, looks like improved with improvement of acute illness, Talked with Dr Bassem Stockton, he will follow the patient as out patient, no further test at this time.   For the Hx  of chronic back pain. Plan, he was given lidocaine patch, Motrin prn, he was counselled to avoid narcotics.     Vital Signs Last 24 Hrs  T(C): 37.2 (05 Sep 2017 10:00), Max: 37.5 (04 Sep 2017 21:30)  T(F): 98.9 (05 Sep 2017 10:00), Max: 99.5 (04 Sep 2017 21:30)  HR: 100 (05 Sep 2017 10:00) (76 - 100)  BP: 160/80 (05 Sep 2017 12:33) (142/80 - 186/94)  RR: 18 (05 Sep 2017 10:00) (18 - 20)  SpO2: 95% (05 Sep 2017 06:01) (93% - 100%)    PHYSICAL EXAM:    GENERAL: Middle age male looking comfortable   HEENT: PERRL, +EOMI  NECK: soft, Supple, No JVD,   CHEST/LUNG: Clear to auscultate bilaterally; No wheezing  HEART: S1S2+, Regular rate and rhythm; No murmurs  ABDOMEN: Soft, Nontender, Nondistended; Bowel sounds present  EXTREMITIES:  1+ Peripheral Pulses, No edema  SKIN: No rashes or lesions  NEURO: AAOX3, no focal deficits, no motor r sensory loss  PSYCH: normal mood    Total time spent 40 minutes

## 2017-09-05 NOTE — DISCHARGE NOTE ADULT - CARE PLAN
Principal Discharge DX:	Septic shock  Goal:	resolved  Instructions for follow-up, activity and diet:	Follow up with PCP  Secondary Diagnosis:	Metabolic acidosis  Goal:	resolved  Secondary Diagnosis:	Hypokalemia  Goal:	take banana a day, get your CMP checked by PCP in 1 week  Secondary Diagnosis:	Acute respiratory failure with hypoxia  Goal:	resolved, Follow up with Dr Gamble at pulmonary clinic tomorrow as scheduled  Secondary Diagnosis:	Elevated liver enzymes  Goal:	Get your CMP check in 1 week, don't take your cholestrole medication for now untill your liver enzymes are better.  Secondary Diagnosis:	NAM (acute kidney injury)  Goal:	resolved  Secondary Diagnosis:	Altered mental status  Goal:	resolved

## 2017-09-05 NOTE — DISCHARGE NOTE ADULT - PATIENT PORTAL LINK FT
“You can access the Follow[employer] Patient Portal, offered by [facility], by registering with the following website: [email or web address]”

## 2017-09-06 ENCOUNTER — APPOINTMENT (OUTPATIENT)
Dept: PULMONOLOGY | Facility: CLINIC | Age: 57
End: 2017-09-06
Payer: COMMERCIAL

## 2017-09-06 VITALS
BODY MASS INDEX: 31.16 KG/M2 | HEIGHT: 65 IN | DIASTOLIC BLOOD PRESSURE: 90 MMHG | WEIGHT: 187 LBS | SYSTOLIC BLOOD PRESSURE: 140 MMHG | HEART RATE: 76 BPM | OXYGEN SATURATION: 97 %

## 2017-09-06 LAB
HAV IGM SER-ACNC: SIGNIFICANT CHANGE UP
HBV CORE IGM SER-ACNC: SIGNIFICANT CHANGE UP
HBV SURFACE AG SER-ACNC: SIGNIFICANT CHANGE UP
HCV AB S/CO SERPL IA: 0.14 S/CO — SIGNIFICANT CHANGE UP
HCV AB SERPL-IMP: SIGNIFICANT CHANGE UP

## 2017-09-06 PROCEDURE — 99214 OFFICE O/P EST MOD 30 MIN: CPT

## 2017-09-06 RX ORDER — TRAMADOL HYDROCHLORIDE 50 MG/1
50 TABLET, COATED ORAL
Qty: 60 | Refills: 0 | Status: DISCONTINUED | COMMUNITY
Start: 2017-06-27 | End: 2017-09-06

## 2017-09-06 RX ORDER — ETODOLAC 300 MG/1
300 CAPSULE ORAL
Qty: 90 | Refills: 0 | Status: DISCONTINUED | COMMUNITY
Start: 2017-08-02 | End: 2017-09-06

## 2017-09-06 RX ORDER — LOSARTAN POTASSIUM AND HYDROCHLOROTHIAZIDE 12.5; 1 MG/1; MG/1
100-12.5 TABLET ORAL
Qty: 30 | Refills: 0 | Status: DISCONTINUED | COMMUNITY
Start: 2017-05-09 | End: 2017-09-06

## 2017-09-06 RX ORDER — HYDROCODONE BITARTRATE AND ACETAMINOPHEN 5; 325 MG/1; MG/1
5-325 TABLET ORAL
Qty: 30 | Refills: 0 | Status: DISCONTINUED | COMMUNITY
Start: 2017-08-02 | End: 2017-09-06

## 2017-09-06 RX ORDER — BUPRENORPHINE HYDROCHLORIDE, NALOXONE HYDROCHLORIDE 8; 2 MG/1; MG/1
8-2 FILM, SOLUBLE BUCCAL; SUBLINGUAL
Qty: 2 | Refills: 0 | Status: DISCONTINUED | COMMUNITY
Start: 2017-08-29 | End: 2017-09-06

## 2017-09-06 RX ORDER — DOXEPIN HYDROCHLORIDE 50 MG/1
50 CAPSULE ORAL
Qty: 30 | Refills: 0 | Status: DISCONTINUED | COMMUNITY
Start: 2017-08-29 | End: 2017-09-06

## 2017-11-20 ENCOUNTER — APPOINTMENT (OUTPATIENT)
Dept: ORTHOPEDIC SURGERY | Facility: CLINIC | Age: 57
End: 2017-11-20
Payer: COMMERCIAL

## 2017-11-20 VITALS
HEIGHT: 65 IN | SYSTOLIC BLOOD PRESSURE: 127 MMHG | WEIGHT: 187 LBS | HEART RATE: 69 BPM | BODY MASS INDEX: 31.16 KG/M2 | DIASTOLIC BLOOD PRESSURE: 85 MMHG

## 2017-11-20 DIAGNOSIS — M19.011 PRIMARY OSTEOARTHRITIS, RIGHT SHOULDER: ICD-10-CM

## 2017-11-20 DIAGNOSIS — M19.019 PRIMARY OSTEOARTHRITIS, UNSPECIFIED SHOULDER: ICD-10-CM

## 2017-11-20 PROCEDURE — 99204 OFFICE O/P NEW MOD 45 MIN: CPT

## 2017-11-20 PROCEDURE — 73030 X-RAY EXAM OF SHOULDER: CPT | Mod: 50

## 2017-11-22 PROBLEM — M19.011 OSTEOARTHRITIS OF RIGHT GLENOHUMERAL JOINT: Status: ACTIVE | Noted: 2017-11-20

## 2018-01-24 ENCOUNTER — APPOINTMENT (OUTPATIENT)
Dept: ORTHOPEDIC SURGERY | Facility: HOSPITAL | Age: 58
End: 2018-01-24

## 2018-11-08 PROBLEM — M54.9 DORSALGIA, UNSPECIFIED: Chronic | Status: ACTIVE | Noted: 2017-08-30

## 2018-11-09 PROBLEM — M47.816 LUMBAR SPONDYLOSIS: Status: ACTIVE | Noted: 2018-11-09

## 2018-11-10 ENCOUNTER — APPOINTMENT (OUTPATIENT)
Dept: ORTHOPEDIC SURGERY | Facility: CLINIC | Age: 58
End: 2018-11-10
Payer: COMMERCIAL

## 2018-11-10 VITALS
HEART RATE: 84 BPM | HEIGHT: 65 IN | DIASTOLIC BLOOD PRESSURE: 98 MMHG | WEIGHT: 187 LBS | BODY MASS INDEX: 31.16 KG/M2 | SYSTOLIC BLOOD PRESSURE: 152 MMHG

## 2018-11-10 DIAGNOSIS — M47.816 SPONDYLOSIS W/OUT MYELOPATHY OR RADICULOPATHY, LUMBAR REGION: ICD-10-CM

## 2018-11-10 DIAGNOSIS — G89.29 DORSALGIA, UNSPECIFIED: ICD-10-CM

## 2018-11-10 DIAGNOSIS — M48.061 SPINAL STENOSIS, LUMBAR REGION WITHOUT NEUROGENIC CLAUDICATION: ICD-10-CM

## 2018-11-10 DIAGNOSIS — M54.9 DORSALGIA, UNSPECIFIED: ICD-10-CM

## 2018-11-10 DIAGNOSIS — Z86.59 PERSONAL HISTORY OF OTHER MENTAL AND BEHAVIORAL DISORDERS: ICD-10-CM

## 2018-11-10 PROCEDURE — 99215 OFFICE O/P EST HI 40 MIN: CPT

## 2018-11-10 PROCEDURE — 72100 X-RAY EXAM L-S SPINE 2/3 VWS: CPT

## 2019-01-01 NOTE — ED PROCEDURE NOTE - CPROC ED TRACHEA INTUB TECH1
"Barbara Lindsey is a 2 m.o. male who was brought in for this well child visit.    History was provided by the mother.    Birth History   • Birth     Length: 50.2 cm (19.75\")     Weight: 3062 g (6 lb 12 oz)   • Delivery Method: , Classical   • Feeding: Bottle Fed - Formula       There is no immunization history on file for this patient.  The following portions of the patient's history were reviewed and updated as appropriate: allergies, current medications, past family history, past medical history, past social history, past surgical history and problem list.    Current Issues:  Current concerns include none.    Review of Nutrition:  Current diet: formula (jose soothe)  Current feeding patterns: 4-5 oz every 3-4 hours  Difficulties with feeding? no  Current stooling frequency: 2-3 times a day    Social Screening:  Current child-care arrangements: in home: primary caregiver is mother waiting on spot in , brother is in   Sibling relations: brothers: 1  Parental coping and self-care: doing well; no concerns  Secondhand smoke exposure? no     Objective     Growth parameters are noted and are appropriate for age.     Clothing Status infant fully unclothed   General:   alert, appears stated age, cooperative and no distress   Skin:   normal   Head:   normal fontanelles, normal appearance, normal palate and supple neck   Eyes:   sclerae white, pupils equal and reactive, red reflex normal bilaterally   Ears:   normal bilaterally   Mouth:   No perioral or gingival cyanosis or lesions.  Tongue is normal in appearance.   Lungs:   clear to auscultation bilaterally   Heart:   regular rate and rhythm, S1, S2 normal, no murmur, click, rub or gallop   Abdomen:   soft, non-tender; bowel sounds normal; no masses,  no organomegaly   Screening DDH:   Ortolani's and Turk's signs absent bilaterally, leg length symmetrical and thigh & gluteal folds symmetrical   :   normal male - testes " descended bilaterally and uncircumcised   Femoral pulses:   present bilaterally   Extremities:   extremities normal, atraumatic, no cyanosis or edema   Neuro:   alert, moves all extremities spontaneously, good 3-phase Ayaka reflex and good suck reflex       Assessment/Plan     Healthy 2 m.o. male  Infant.     Blood Pressure Risk Assessment    Child with specific risk conditions or change in risk No   Action NA   Vision Assessment    Parental concern, abnormal fundoscopic examination results, or prematurity with risk conditions. No   Do you have concerns about how your child sees? No   Action NA   Hearing Assessment    Do you have concerns about how your child hears? No   Action NA     Maicol was seen today for well child.    Diagnoses and all orders for this visit:    Encounter for routine child health examination without abnormal findings    Short stature (child)          1. Anticipatory guidance discussed.  Gave handout on well-child issues at this age.    2. Ultrasound of the hips to screen for developmental dysplasia of the hip: no    3. Development: appropriate for age    4. Immunizations today: none. Must go to health department due to insurance, counseled on needed vaccinations.    5. Follow-up visit in 2 months for next well child visit, or sooner as needed.            direct laryngoscopy

## 2019-08-08 ENCOUNTER — APPOINTMENT (OUTPATIENT)
Dept: PULMONOLOGY | Facility: CLINIC | Age: 59
End: 2019-08-08
Payer: COMMERCIAL

## 2019-08-08 VITALS
WEIGHT: 177 LBS | BODY MASS INDEX: 29.45 KG/M2 | SYSTOLIC BLOOD PRESSURE: 128 MMHG | OXYGEN SATURATION: 98 % | DIASTOLIC BLOOD PRESSURE: 80 MMHG | HEART RATE: 67 BPM

## 2019-08-08 VITALS — BODY MASS INDEX: 29.45 KG/M2 | HEIGHT: 65 IN

## 2019-08-08 DIAGNOSIS — Z87.01 PERSONAL HISTORY OF PNEUMONIA (RECURRENT): ICD-10-CM

## 2019-08-08 PROCEDURE — 94010 BREATHING CAPACITY TEST: CPT

## 2019-08-08 PROCEDURE — 94727 GAS DIL/WSHOT DETER LNG VOL: CPT

## 2019-08-08 PROCEDURE — 99214 OFFICE O/P EST MOD 30 MIN: CPT | Mod: 25

## 2019-08-08 PROCEDURE — 94729 DIFFUSING CAPACITY: CPT

## 2019-08-08 PROCEDURE — 85018 HEMOGLOBIN: CPT | Mod: QW

## 2019-08-08 RX ORDER — LOSARTAN POTASSIUM 100 MG/1
100 TABLET, FILM COATED ORAL
Qty: 30 | Refills: 0 | Status: DISCONTINUED | COMMUNITY
Start: 2017-06-11 | End: 2019-08-08

## 2019-08-08 RX ORDER — PANTOPRAZOLE SODIUM 40 MG/10ML
40 INJECTION, POWDER, FOR SOLUTION INTRAVENOUS
Refills: 0 | Status: DISCONTINUED | COMMUNITY
End: 2019-08-08

## 2019-08-08 RX ORDER — METOPROLOL TARTRATE 25 MG/1
25 TABLET, FILM COATED ORAL
Qty: 60 | Refills: 0 | Status: DISCONTINUED | COMMUNITY
Start: 2017-09-06 | End: 2019-08-08

## 2019-08-08 RX ORDER — METOPROLOL SUCCINATE 25 MG/1
25 TABLET, EXTENDED RELEASE ORAL
Refills: 0 | Status: DISCONTINUED | COMMUNITY
End: 2019-08-08

## 2019-08-08 NOTE — HISTORY OF PRESENT ILLNESS
[Initial Evaluation] : an initial evaluation of [Shortness of Breath] : Shortness of Breath [Excessive Daytime Sleepiness] : excessive daytime sleepiness [Witnessed Gasping During Sleep] : witnessed gasping during sleep [Snoring] : snoring [Unrefreshing Sleep] : unrefreshing sleep [Sleepy When Sedentary] : sleepy when sedentary [Currently Experiencing] : The patient is currently experiencing symptoms. [None] : No associated symptoms are reported [FreeTextEntry1] : In 2017, the patient was hospitalized for pneumonia and respiratory failure complicated by septic shock and requiring intubation with mechanical ventilation support. He improved with pressor support, IVF hydration and abx. He was successfully extubated but was noted to have significant desaturations with sleep though was not hypoxic while awake. He had O2 at home but no longer requires it. He now presents for reevaluation for likely RYAN. \par He reports mild SOBOE with improvement since his last visit in 2017. [Witnessed Apnea During Sleep] : no witnessed apnea during sleep

## 2019-08-08 NOTE — REVIEW OF SYSTEMS
[Nasal Congestion] : nasal congestion [Postnasal Drip] : postnasal drip [Sinus Problems] : sinus problems [Cough] : cough [Dyspnea] : dyspnea [Hypertension] : ~T hypertension [Depression] : depression [As Noted in HPI] : as noted in HPI [Fever] : no fever [Dry Eyes] : no dryness of the eyes [Chills] : no chills [Eye Irritation] : no ~T irritation of the eyes [Epistaxis] : no nosebleeds [Sputum] : not coughing up ~M sputum [Hemoptysis] : no hemoptysis [Chest Tightness] : no chest tightness [Pleuritic Pain] : no pleuritic pain [Wheezing] : no wheezing [Chest Discomfort] : no chest discomfort [Dysrhythmia] : no dysrhythmia [Murmurs] : no murmurs were heard [Palpitations] : no palpitations [Edema] : ~T edema was not present [Hay Fever] : no hay fever [Itchy Eyes] : no itching of ~T the eyes [Reflux] : no reflux [Nausea] : no nausea [Vomiting] : no vomiting [Constipation] : no constipation [Diarrhea] : no diarrhea [Abdominal Pain] : no abdominal pain [Dysuria] : no dysuria [Trauma] : no ~T physical trauma [Anemia] : no anemia [Fracture] : no fracture [Headache] : no headache [Dizziness] : no dizziness [Syncope] : no fainting [Numbness] : no numbness [Paralysis] : no paralysis was seen [Seizures] : no seizures [Anxiety] : no anxiety [Diabetes] : no diabetes mellitus [Thyroid Problem] : no thyroid problem

## 2019-08-08 NOTE — DISCUSSION/SUMMARY
[FreeTextEntry1] : \par #1. Home PSG to r/o RYAN\par #2. Diet and exercise for weight loss\par #3. His SOBOE may be related to weight and deconditioning as his PFTs are normal\par #4. F/u after PSG; consider autoCPAP if significant RYAN\par #5. CXR to further evaluate SOB and c/w prior\par #6. Cardiology f/u\par #7. I stressed the importance of the above recommendations and patient exhibits good understanding\par \par Discussed with daughter on phone

## 2019-08-08 NOTE — CONSULT LETTER
[Dear  ___] : Dear  [unfilled], [Consult Letter:] : I had the pleasure of evaluating your patient, [unfilled]. [Please see my note below.] : Please see my note below. [Consult Closing:] : Thank you very much for allowing me to participate in the care of this patient.  If you have any questions, please do not hesitate to contact me. [Sincerely,] : Sincerely, [DrFestus  ___] : Dr. RUIZ [Alek Chapa MD] : Alek Chapa MD [FreeTextEntry3] : Alek Chapa MD, FCCP, BIANCA. ABSM\par

## 2019-08-08 NOTE — PHYSICAL EXAM
[General Appearance - Well Developed] : well developed [Normal Appearance] : normal appearance [General Appearance - In No Acute Distress] : no acute distress [Normal Conjunctiva] : the conjunctiva exhibited no abnormalities [Elongated Uvula] : elongated uvula [Low Lying Soft Palate] : low lying soft palate [Enlarged Base of the Tongue] : enlargement of the base of the tongue [III] : III [Neck Appearance] : the appearance of the neck was normal [Heart Rate And Rhythm] : heart rate and rhythm were normal [Heart Sounds] : normal S1 and S2 [Murmurs] : no murmurs present [Edema] : no peripheral edema present [] : no respiratory distress [Respiration, Rhythm And Depth] : normal respiratory rhythm and effort [Exaggerated Use Of Accessory Muscles For Inspiration] : no accessory muscle use [Auscultation Breath Sounds / Voice Sounds] : lungs were clear to auscultation bilaterally [Abdomen Soft] : soft [Abdomen Tenderness] : non-tender [Abnormal Walk] : normal gait [Nail Clubbing] : no clubbing of the fingernails [Cyanosis, Localized] : no localized cyanosis [1+ Pitting] : 1+  pitting [No Focal Deficits] : no focal deficits [Oriented To Time, Place, And Person] : oriented to person, place, and time [FreeTextEntry1] : No abnormalities.

## 2019-08-08 NOTE — REASON FOR VISIT
[Abnormal CT Scan] : abnormal CT Scan [Cough] : cough [Shortness of Breath] : shortness of Breath [Sleep Apnea] : sleep apnea [Follow-Up] : a follow-up visit [FreeTextEntry2] : obesity

## 2019-10-12 ENCOUNTER — TRANSCRIPTION ENCOUNTER (OUTPATIENT)
Age: 59
End: 2019-10-12

## 2019-11-21 ENCOUNTER — OUTPATIENT (OUTPATIENT)
Dept: OUTPATIENT SERVICES | Facility: HOSPITAL | Age: 59
LOS: 1 days | End: 2019-11-21

## 2019-11-21 DIAGNOSIS — G47.33 OBSTRUCTIVE SLEEP APNEA (ADULT) (PEDIATRIC): ICD-10-CM

## 2019-12-19 ENCOUNTER — INPATIENT (INPATIENT)
Facility: HOSPITAL | Age: 59
LOS: 1 days | Discharge: HOME | DRG: 392 | End: 2019-12-21
Attending: COLON & RECTAL SURGERY | Admitting: COLON & RECTAL SURGERY
Payer: COMMERCIAL

## 2019-12-19 VITALS
DIASTOLIC BLOOD PRESSURE: 105 MMHG | TEMPERATURE: 98 F | RESPIRATION RATE: 18 BRPM | WEIGHT: 188.05 LBS | HEART RATE: 84 BPM | OXYGEN SATURATION: 99 % | SYSTOLIC BLOOD PRESSURE: 173 MMHG | HEIGHT: 67 IN

## 2019-12-19 DIAGNOSIS — K57.92 DIVERTICULITIS OF INTESTINE, PART UNSPECIFIED, WITHOUT PERFORATION OR ABSCESS WITHOUT BLEEDING: ICD-10-CM

## 2019-12-19 LAB
ALBUMIN SERPL ELPH-MCNC: 4.5 G/DL — SIGNIFICANT CHANGE UP (ref 3.3–5.2)
ALP SERPL-CCNC: 83 U/L — SIGNIFICANT CHANGE UP (ref 40–120)
ALT FLD-CCNC: 36 U/L — SIGNIFICANT CHANGE UP
ANION GAP SERPL CALC-SCNC: 12 MMOL/L — SIGNIFICANT CHANGE UP (ref 5–17)
APPEARANCE UR: CLEAR — SIGNIFICANT CHANGE UP
AST SERPL-CCNC: 38 U/L — SIGNIFICANT CHANGE UP
BACTERIA # UR AUTO: ABNORMAL
BASOPHILS # BLD AUTO: 0.03 K/UL — SIGNIFICANT CHANGE UP (ref 0–0.2)
BASOPHILS NFR BLD AUTO: 0.3 % — SIGNIFICANT CHANGE UP (ref 0–2)
BILIRUB SERPL-MCNC: 0.8 MG/DL — SIGNIFICANT CHANGE UP (ref 0.4–2)
BILIRUB UR-MCNC: NEGATIVE — SIGNIFICANT CHANGE UP
BUN SERPL-MCNC: 22 MG/DL — HIGH (ref 8–20)
CALCIUM SERPL-MCNC: 9.9 MG/DL — SIGNIFICANT CHANGE UP (ref 8.6–10.2)
CHLORIDE SERPL-SCNC: 101 MMOL/L — SIGNIFICANT CHANGE UP (ref 98–107)
CO2 SERPL-SCNC: 28 MMOL/L — SIGNIFICANT CHANGE UP (ref 22–29)
COLOR SPEC: YELLOW — SIGNIFICANT CHANGE UP
CREAT SERPL-MCNC: 0.96 MG/DL — SIGNIFICANT CHANGE UP (ref 0.5–1.3)
DIFF PNL FLD: ABNORMAL
EOSINOPHIL # BLD AUTO: 0.36 K/UL — SIGNIFICANT CHANGE UP (ref 0–0.5)
EOSINOPHIL NFR BLD AUTO: 3.2 % — SIGNIFICANT CHANGE UP (ref 0–6)
EPI CELLS # UR: SIGNIFICANT CHANGE UP
GLUCOSE SERPL-MCNC: 103 MG/DL — SIGNIFICANT CHANGE UP (ref 70–115)
GLUCOSE UR QL: NEGATIVE MG/DL — SIGNIFICANT CHANGE UP
HCT VFR BLD CALC: 50.5 % — HIGH (ref 39–50)
HGB BLD-MCNC: 16.3 G/DL — SIGNIFICANT CHANGE UP (ref 13–17)
IMM GRANULOCYTES NFR BLD AUTO: 0.4 % — SIGNIFICANT CHANGE UP (ref 0–1.5)
KETONES UR-MCNC: NEGATIVE — SIGNIFICANT CHANGE UP
LEUKOCYTE ESTERASE UR-ACNC: NEGATIVE — SIGNIFICANT CHANGE UP
LIDOCAIN IGE QN: 50 U/L — SIGNIFICANT CHANGE UP (ref 22–51)
LYMPHOCYTES # BLD AUTO: 1.75 K/UL — SIGNIFICANT CHANGE UP (ref 1–3.3)
LYMPHOCYTES # BLD AUTO: 15.8 % — SIGNIFICANT CHANGE UP (ref 13–44)
MCHC RBC-ENTMCNC: 29.6 PG — SIGNIFICANT CHANGE UP (ref 27–34)
MCHC RBC-ENTMCNC: 32.3 GM/DL — SIGNIFICANT CHANGE UP (ref 32–36)
MCV RBC AUTO: 91.7 FL — SIGNIFICANT CHANGE UP (ref 80–100)
MONOCYTES # BLD AUTO: 1.1 K/UL — HIGH (ref 0–0.9)
MONOCYTES NFR BLD AUTO: 9.9 % — SIGNIFICANT CHANGE UP (ref 2–14)
NEUTROPHILS # BLD AUTO: 7.8 K/UL — HIGH (ref 1.8–7.4)
NEUTROPHILS NFR BLD AUTO: 70.4 % — SIGNIFICANT CHANGE UP (ref 43–77)
NITRITE UR-MCNC: NEGATIVE — SIGNIFICANT CHANGE UP
PH UR: 6 — SIGNIFICANT CHANGE UP (ref 5–8)
PLATELET # BLD AUTO: 252 K/UL — SIGNIFICANT CHANGE UP (ref 150–400)
POTASSIUM SERPL-MCNC: 5.4 MMOL/L — HIGH (ref 3.5–5.3)
POTASSIUM SERPL-SCNC: 5.4 MMOL/L — HIGH (ref 3.5–5.3)
PROT SERPL-MCNC: 8.1 G/DL — SIGNIFICANT CHANGE UP (ref 6.6–8.7)
PROT UR-MCNC: NEGATIVE MG/DL — SIGNIFICANT CHANGE UP
RBC # BLD: 5.51 M/UL — SIGNIFICANT CHANGE UP (ref 4.2–5.8)
RBC # FLD: 12.5 % — SIGNIFICANT CHANGE UP (ref 10.3–14.5)
RBC CASTS # UR COMP ASSIST: SIGNIFICANT CHANGE UP /HPF (ref 0–4)
SODIUM SERPL-SCNC: 141 MMOL/L — SIGNIFICANT CHANGE UP (ref 135–145)
SP GR SPEC: 1.01 — SIGNIFICANT CHANGE UP (ref 1.01–1.02)
UROBILINOGEN FLD QL: NEGATIVE MG/DL — SIGNIFICANT CHANGE UP
WBC # BLD: 11.08 K/UL — HIGH (ref 3.8–10.5)
WBC # FLD AUTO: 11.08 K/UL — HIGH (ref 3.8–10.5)
WBC UR QL: SIGNIFICANT CHANGE UP

## 2019-12-19 PROCEDURE — 74177 CT ABD & PELVIS W/CONTRAST: CPT | Mod: 26

## 2019-12-19 PROCEDURE — 99222 1ST HOSP IP/OBS MODERATE 55: CPT

## 2019-12-19 PROCEDURE — 99285 EMERGENCY DEPT VISIT HI MDM: CPT

## 2019-12-19 PROCEDURE — 93010 ELECTROCARDIOGRAM REPORT: CPT

## 2019-12-19 RX ORDER — LOSARTAN POTASSIUM 100 MG/1
25 TABLET, FILM COATED ORAL DAILY
Refills: 0 | Status: DISCONTINUED | OUTPATIENT
Start: 2019-12-19 | End: 2019-12-20

## 2019-12-19 RX ORDER — ONDANSETRON 8 MG/1
4 TABLET, FILM COATED ORAL ONCE
Refills: 0 | Status: COMPLETED | OUTPATIENT
Start: 2019-12-19 | End: 2019-12-19

## 2019-12-19 RX ORDER — SODIUM CHLORIDE 9 MG/ML
1000 INJECTION, SOLUTION INTRAVENOUS
Refills: 0 | Status: DISCONTINUED | OUTPATIENT
Start: 2019-12-19 | End: 2019-12-20

## 2019-12-19 RX ORDER — HYDROMORPHONE HYDROCHLORIDE 2 MG/ML
0.5 INJECTION INTRAMUSCULAR; INTRAVENOUS; SUBCUTANEOUS
Refills: 0 | Status: DISCONTINUED | OUTPATIENT
Start: 2019-12-19 | End: 2019-12-19

## 2019-12-19 RX ORDER — PIPERACILLIN AND TAZOBACTAM 4; .5 G/20ML; G/20ML
3.38 INJECTION, POWDER, LYOPHILIZED, FOR SOLUTION INTRAVENOUS ONCE
Refills: 0 | Status: COMPLETED | OUTPATIENT
Start: 2019-12-19 | End: 2019-12-19

## 2019-12-19 RX ORDER — ONDANSETRON 8 MG/1
4 TABLET, FILM COATED ORAL EVERY 6 HOURS
Refills: 0 | Status: DISCONTINUED | OUTPATIENT
Start: 2019-12-19 | End: 2019-12-21

## 2019-12-19 RX ORDER — HYDROMORPHONE HYDROCHLORIDE 2 MG/ML
1 INJECTION INTRAMUSCULAR; INTRAVENOUS; SUBCUTANEOUS EVERY 4 HOURS
Refills: 0 | Status: DISCONTINUED | OUTPATIENT
Start: 2019-12-19 | End: 2019-12-19

## 2019-12-19 RX ORDER — GABAPENTIN 400 MG/1
300 CAPSULE ORAL
Refills: 0 | Status: DISCONTINUED | OUTPATIENT
Start: 2019-12-19 | End: 2019-12-21

## 2019-12-19 RX ORDER — SODIUM CHLORIDE 9 MG/ML
1000 INJECTION INTRAMUSCULAR; INTRAVENOUS; SUBCUTANEOUS ONCE
Refills: 0 | Status: COMPLETED | OUTPATIENT
Start: 2019-12-19 | End: 2019-12-19

## 2019-12-19 RX ORDER — ENOXAPARIN SODIUM 100 MG/ML
40 INJECTION SUBCUTANEOUS DAILY
Refills: 0 | Status: DISCONTINUED | OUTPATIENT
Start: 2019-12-19 | End: 2019-12-21

## 2019-12-19 RX ORDER — KETOROLAC TROMETHAMINE 30 MG/ML
15 SYRINGE (ML) INJECTION EVERY 6 HOURS
Refills: 0 | Status: DISCONTINUED | OUTPATIENT
Start: 2019-12-19 | End: 2019-12-21

## 2019-12-19 RX ORDER — FLUOXETINE HCL 10 MG
10 CAPSULE ORAL DAILY
Refills: 0 | Status: DISCONTINUED | OUTPATIENT
Start: 2019-12-19 | End: 2019-12-20

## 2019-12-19 RX ORDER — MORPHINE SULFATE 50 MG/1
4 CAPSULE, EXTENDED RELEASE ORAL ONCE
Refills: 0 | Status: DISCONTINUED | OUTPATIENT
Start: 2019-12-19 | End: 2019-12-19

## 2019-12-19 RX ORDER — ACETAMINOPHEN 500 MG
650 TABLET ORAL EVERY 6 HOURS
Refills: 0 | Status: DISCONTINUED | OUTPATIENT
Start: 2019-12-19 | End: 2019-12-21

## 2019-12-19 RX ORDER — KETOROLAC TROMETHAMINE 30 MG/ML
30 SYRINGE (ML) INJECTION ONCE
Refills: 0 | Status: DISCONTINUED | OUTPATIENT
Start: 2019-12-19 | End: 2019-12-19

## 2019-12-19 RX ORDER — PANTOPRAZOLE SODIUM 20 MG/1
40 TABLET, DELAYED RELEASE ORAL
Refills: 0 | Status: DISCONTINUED | OUTPATIENT
Start: 2019-12-19 | End: 2019-12-21

## 2019-12-19 RX ORDER — METOPROLOL TARTRATE 50 MG
25 TABLET ORAL
Refills: 0 | Status: DISCONTINUED | OUTPATIENT
Start: 2019-12-19 | End: 2019-12-20

## 2019-12-19 RX ADMIN — SODIUM CHLORIDE 125 MILLILITER(S): 9 INJECTION, SOLUTION INTRAVENOUS at 15:00

## 2019-12-19 RX ADMIN — Medication 15 MILLIGRAM(S): at 17:34

## 2019-12-19 RX ADMIN — Medication 650 MILLIGRAM(S): at 18:04

## 2019-12-19 RX ADMIN — Medication 15 MILLIGRAM(S): at 17:49

## 2019-12-19 RX ADMIN — SODIUM CHLORIDE 1000 MILLILITER(S): 9 INJECTION INTRAMUSCULAR; INTRAVENOUS; SUBCUTANEOUS at 08:48

## 2019-12-19 RX ADMIN — GABAPENTIN 300 MILLIGRAM(S): 400 CAPSULE ORAL at 17:34

## 2019-12-19 RX ADMIN — PIPERACILLIN AND TAZOBACTAM 200 GRAM(S): 4; .5 INJECTION, POWDER, LYOPHILIZED, FOR SOLUTION INTRAVENOUS at 12:23

## 2019-12-19 RX ADMIN — Medication 30 MILLIGRAM(S): at 10:48

## 2019-12-19 RX ADMIN — Medication 30 MILLIGRAM(S): at 10:18

## 2019-12-19 RX ADMIN — Medication 25 MILLIGRAM(S): at 17:34

## 2019-12-19 RX ADMIN — ONDANSETRON 4 MILLIGRAM(S): 8 TABLET, FILM COATED ORAL at 09:31

## 2019-12-19 RX ADMIN — SODIUM CHLORIDE 125 MILLILITER(S): 9 INJECTION, SOLUTION INTRAVENOUS at 22:34

## 2019-12-19 RX ADMIN — Medication 650 MILLIGRAM(S): at 17:34

## 2019-12-19 NOTE — ED ADULT NURSE NOTE - NSIMPLEMENTINTERV_GEN_ALL_ED
Implemented All Universal Safety Interventions:  [place] to call system. Call bell, personal items and telephone within reach. Instruct patient to call for assistance. Room bathroom lighting operational. Non-slip footwear when patient is off stretcher. Physically safe environment: no spills, clutter or unnecessary equipment. Stretcher in lowest position, wheels locked, appropriate side rails in place.

## 2019-12-19 NOTE — H&P ADULT - ASSESSMENT
This 56 yo male, PMHx HTN, HLD, chronic back pain who presents to the ED c/o LLQ abdominal pain x 2 weeks.   Progressively worsening prompting him to come to ED  No nausea, vomiting, diarrhea, hematochezia or mucus in stool  No prior colonoscopy, no FHx of colon ca, colon polyp or IBD  Afebrile, HD well  WBC 11k  CT findings of sigmoid diverticulitis with microperforation, no abscess  -Admit to Colorectal service  -Pain management  -IV zosyn  -NPO/IVFs  -Will restart HTN home medications  -DVT ppx  -Patient will need a colonoscopy as outpatient once he has recovered from current diverticulitis  Plan discussed with Dr. Carvalho, ED, patient and wife who agree with above   All questions from the patient and his wife were answered during our discussion

## 2019-12-19 NOTE — ED STATDOCS - OBJECTIVE STATEMENT
60 y/o M pt with PMHx of HLD, chronic back pain, HTN presents to the ED c/o abdominal pain. Worsening LLQ abdominal pain for 2 weeks. Denies fever, nausea, vomiting, diarrhea.

## 2019-12-19 NOTE — H&P ADULT - HISTORY OF PRESENT ILLNESS
This 58 yo male, PMHx HTN, HLD, chronic back pain who presents to the ED c/o LLQ abdominal pain x 2 weeks. Patient describes pain well localized, non-radiating, 7/10 intensity, sharp in nature with no alleviating factors. Pain was intermittent until this morning when it worsen prompting him to come to the ED. He denies prior colonoscopy. No family history of colon ca or IBD. States he is hungry, having BM, normal formed stools, no blood or mucus on stools. Denies fever, chills, SOB, chest pain or any other symptoms

## 2019-12-19 NOTE — H&P ADULT - NSHPPHYSICALEXAM_GEN_ALL_CORE
General: AAOx3, NAD  Respiratory: CTA B/L  CV: RRR, S1S2, no murmurs, rubs or gallops  Abdominal: Globus abdomen, soft, ND, LLQ TTP, no rebound tenderness  Extremities: No edema, + peripheral pulses  Vascular: +2 pulses all throughout   Neurology: grossly intact   Skin: intact

## 2019-12-19 NOTE — ED ADULT NURSE NOTE - CHPI ED NUR SYMPTOMS NEG
no hematuria/no dysuria/no diarrhea/no chills/no blood in stool/no burning urination/no fever/no nausea/no vomiting/no abdominal distension

## 2019-12-19 NOTE — ED STATDOCS - ATTENDING CONTRIBUTION TO CARE
I, Eder Linares, performed the initial face to face bedside interview with this patient regarding history of present illness, review of symptoms and relevant past medical, social and family history.  I completed an independent physical examination.  I was the initial provider who evaluated this patient. I have signed out the follow up of any pending tests (i.e. labs, radiological studies) to the ACP.  I have communicated the patient’s plan of care and disposition with the ACP.  The history, relevant review of systems, past medical and surgical history, medical decision making, and physical examination was documented by the scribe in my presence and I attest to the accuracy of the documentation.

## 2019-12-19 NOTE — ED ADULT NURSE NOTE - CHIEF COMPLAINT QUOTE
lower abd pain more on lt than rt it started a few days ago denies n/v/d DISPLAY PLAN FREE TEXT DISPLAY PLAN FREE TEXT DISPLAY PLAN FREE TEXT DISPLAY PLAN FREE TEXT DISPLAY PLAN FREE TEXT DISPLAY PLAN FREE TEXT DISPLAY PLAN FREE TEXT DISPLAY PLAN FREE TEXT

## 2019-12-20 ENCOUNTER — TRANSCRIPTION ENCOUNTER (OUTPATIENT)
Age: 59
End: 2019-12-20

## 2019-12-20 LAB
ANION GAP SERPL CALC-SCNC: 12 MMOL/L — SIGNIFICANT CHANGE UP (ref 5–17)
BASOPHILS # BLD AUTO: 0.04 K/UL — SIGNIFICANT CHANGE UP (ref 0–0.2)
BASOPHILS NFR BLD AUTO: 0.5 % — SIGNIFICANT CHANGE UP (ref 0–2)
BUN SERPL-MCNC: 22 MG/DL — HIGH (ref 8–20)
CALCIUM SERPL-MCNC: 8.7 MG/DL — SIGNIFICANT CHANGE UP (ref 8.6–10.2)
CHLORIDE SERPL-SCNC: 104 MMOL/L — SIGNIFICANT CHANGE UP (ref 98–107)
CO2 SERPL-SCNC: 26 MMOL/L — SIGNIFICANT CHANGE UP (ref 22–29)
CREAT SERPL-MCNC: 1.02 MG/DL — SIGNIFICANT CHANGE UP (ref 0.5–1.3)
EOSINOPHIL # BLD AUTO: 0.44 K/UL — SIGNIFICANT CHANGE UP (ref 0–0.5)
EOSINOPHIL NFR BLD AUTO: 5.7 % — SIGNIFICANT CHANGE UP (ref 0–6)
GLUCOSE SERPL-MCNC: 82 MG/DL — SIGNIFICANT CHANGE UP (ref 70–115)
HCT VFR BLD CALC: 43.9 % — SIGNIFICANT CHANGE UP (ref 39–50)
HCV AB S/CO SERPL IA: 0.15 S/CO — SIGNIFICANT CHANGE UP (ref 0–0.99)
HCV AB SERPL-IMP: SIGNIFICANT CHANGE UP
HGB BLD-MCNC: 14.9 G/DL — SIGNIFICANT CHANGE UP (ref 13–17)
IMM GRANULOCYTES NFR BLD AUTO: 0.4 % — SIGNIFICANT CHANGE UP (ref 0–1.5)
LYMPHOCYTES # BLD AUTO: 1.33 K/UL — SIGNIFICANT CHANGE UP (ref 1–3.3)
LYMPHOCYTES # BLD AUTO: 17.3 % — SIGNIFICANT CHANGE UP (ref 13–44)
MAGNESIUM SERPL-MCNC: 2.1 MG/DL — SIGNIFICANT CHANGE UP (ref 1.6–2.6)
MCHC RBC-ENTMCNC: 30.4 PG — SIGNIFICANT CHANGE UP (ref 27–34)
MCHC RBC-ENTMCNC: 33.9 GM/DL — SIGNIFICANT CHANGE UP (ref 32–36)
MCV RBC AUTO: 89.6 FL — SIGNIFICANT CHANGE UP (ref 80–100)
MONOCYTES # BLD AUTO: 0.67 K/UL — SIGNIFICANT CHANGE UP (ref 0–0.9)
MONOCYTES NFR BLD AUTO: 8.7 % — SIGNIFICANT CHANGE UP (ref 2–14)
NEUTROPHILS # BLD AUTO: 5.16 K/UL — SIGNIFICANT CHANGE UP (ref 1.8–7.4)
NEUTROPHILS NFR BLD AUTO: 67.4 % — SIGNIFICANT CHANGE UP (ref 43–77)
PHOSPHATE SERPL-MCNC: 2.8 MG/DL — SIGNIFICANT CHANGE UP (ref 2.4–4.7)
PLATELET # BLD AUTO: 216 K/UL — SIGNIFICANT CHANGE UP (ref 150–400)
POTASSIUM SERPL-MCNC: 3.9 MMOL/L — SIGNIFICANT CHANGE UP (ref 3.5–5.3)
POTASSIUM SERPL-SCNC: 3.9 MMOL/L — SIGNIFICANT CHANGE UP (ref 3.5–5.3)
RBC # BLD: 4.9 M/UL — SIGNIFICANT CHANGE UP (ref 4.2–5.8)
RBC # FLD: 12.4 % — SIGNIFICANT CHANGE UP (ref 10.3–14.5)
SODIUM SERPL-SCNC: 142 MMOL/L — SIGNIFICANT CHANGE UP (ref 135–145)
WBC # BLD: 7.67 K/UL — SIGNIFICANT CHANGE UP (ref 3.8–10.5)
WBC # FLD AUTO: 7.67 K/UL — SIGNIFICANT CHANGE UP (ref 3.8–10.5)

## 2019-12-20 PROCEDURE — 99233 SBSQ HOSP IP/OBS HIGH 50: CPT

## 2019-12-20 RX ORDER — POTASSIUM CHLORIDE 20 MEQ
10 PACKET (EA) ORAL ONCE
Refills: 0 | Status: COMPLETED | OUTPATIENT
Start: 2019-12-20 | End: 2019-12-20

## 2019-12-20 RX ORDER — ACETAMINOPHEN 500 MG
2 TABLET ORAL
Qty: 0 | Refills: 0 | DISCHARGE
Start: 2019-12-20

## 2019-12-20 RX ORDER — PIPERACILLIN AND TAZOBACTAM 4; .5 G/20ML; G/20ML
3.38 INJECTION, POWDER, LYOPHILIZED, FOR SOLUTION INTRAVENOUS EVERY 8 HOURS
Refills: 0 | Status: DISCONTINUED | OUTPATIENT
Start: 2019-12-20 | End: 2019-12-21

## 2019-12-20 RX ORDER — HYDROCHLOROTHIAZIDE 25 MG
12.5 TABLET ORAL DAILY
Refills: 0 | Status: DISCONTINUED | OUTPATIENT
Start: 2019-12-20 | End: 2019-12-21

## 2019-12-20 RX ORDER — FLUOXETINE HCL 10 MG
20 CAPSULE ORAL DAILY
Refills: 0 | Status: DISCONTINUED | OUTPATIENT
Start: 2019-12-20 | End: 2019-12-21

## 2019-12-20 RX ORDER — FLUOXETINE HCL 10 MG
40 CAPSULE ORAL DAILY
Refills: 0 | Status: DISCONTINUED | OUTPATIENT
Start: 2019-12-20 | End: 2019-12-20

## 2019-12-20 RX ORDER — VALSARTAN 80 MG/1
160 TABLET ORAL DAILY
Refills: 0 | Status: DISCONTINUED | OUTPATIENT
Start: 2019-12-20 | End: 2019-12-21

## 2019-12-20 RX ORDER — SIMVASTATIN 20 MG/1
40 TABLET, FILM COATED ORAL AT BEDTIME
Refills: 0 | Status: DISCONTINUED | OUTPATIENT
Start: 2019-12-20 | End: 2019-12-21

## 2019-12-20 RX ORDER — TRAZODONE HCL 50 MG
200 TABLET ORAL AT BEDTIME
Refills: 0 | Status: DISCONTINUED | OUTPATIENT
Start: 2019-12-20 | End: 2019-12-21

## 2019-12-20 RX ORDER — FLUOXETINE HCL 10 MG
1 CAPSULE ORAL
Qty: 0 | Refills: 0 | DISCHARGE

## 2019-12-20 RX ADMIN — Medication 650 MILLIGRAM(S): at 05:14

## 2019-12-20 RX ADMIN — SODIUM CHLORIDE 125 MILLILITER(S): 9 INJECTION, SOLUTION INTRAVENOUS at 06:08

## 2019-12-20 RX ADMIN — LOSARTAN POTASSIUM 25 MILLIGRAM(S): 100 TABLET, FILM COATED ORAL at 05:14

## 2019-12-20 RX ADMIN — Medication 650 MILLIGRAM(S): at 12:47

## 2019-12-20 RX ADMIN — Medication 100 MILLIEQUIVALENT(S): at 10:01

## 2019-12-20 RX ADMIN — Medication 650 MILLIGRAM(S): at 17:26

## 2019-12-20 RX ADMIN — Medication 650 MILLIGRAM(S): at 17:56

## 2019-12-20 RX ADMIN — PIPERACILLIN AND TAZOBACTAM 25 GRAM(S): 4; .5 INJECTION, POWDER, LYOPHILIZED, FOR SOLUTION INTRAVENOUS at 17:25

## 2019-12-20 RX ADMIN — GABAPENTIN 300 MILLIGRAM(S): 400 CAPSULE ORAL at 17:25

## 2019-12-20 RX ADMIN — Medication 12.5 MILLIGRAM(S): at 12:47

## 2019-12-20 RX ADMIN — SIMVASTATIN 40 MILLIGRAM(S): 20 TABLET, FILM COATED ORAL at 21:15

## 2019-12-20 RX ADMIN — VALSARTAN 160 MILLIGRAM(S): 80 TABLET ORAL at 10:21

## 2019-12-20 RX ADMIN — Medication 62.5 MILLIMOLE(S): at 10:21

## 2019-12-20 RX ADMIN — PANTOPRAZOLE SODIUM 40 MILLIGRAM(S): 20 TABLET, DELAYED RELEASE ORAL at 05:14

## 2019-12-20 RX ADMIN — Medication 650 MILLIGRAM(S): at 13:17

## 2019-12-20 RX ADMIN — PIPERACILLIN AND TAZOBACTAM 25 GRAM(S): 4; .5 INJECTION, POWDER, LYOPHILIZED, FOR SOLUTION INTRAVENOUS at 09:56

## 2019-12-20 RX ADMIN — Medication 650 MILLIGRAM(S): at 06:01

## 2019-12-20 RX ADMIN — ENOXAPARIN SODIUM 40 MILLIGRAM(S): 100 INJECTION SUBCUTANEOUS at 12:49

## 2019-12-20 RX ADMIN — Medication 25 MILLIGRAM(S): at 05:14

## 2019-12-20 RX ADMIN — Medication 20 MILLIGRAM(S): at 12:48

## 2019-12-20 NOTE — DISCHARGE NOTE PROVIDER - HOSPITAL COURSE
Mr. Lorenz is a 56 yo male, PMHx HTN, HLD, chronic back pain who presents to the ED c/o LLQ abdominal pain x 2 weeks. At that time pain was well localized, non-radiating, 7/10 intensity, sharp in nature with no alleviating factors. He has no prior history of colonoscopy. No family history of colon ca or IBD. He continue to have BM, normal formed stools, no blood or mucus on stools. Denies fever, chills, SOB, chest pain or any other symptoms.    In the ED CT imaging revealed a sigmoid diverticulitis with microperforation, no abscess. Patient evaluated by colorectal surgery team and started on IV antibiotics. On hospital day 2 abdominal pain improved and patient was advanced to a FLD. He continued to tolerate well without nausea or vomit. Patient to have colonoscopy as outpatient. Clinically stable for discharge. Mr. Lorenz is a 58 yo male, PMHx HTN, HLD, chronic back pain who presents to the ED c/o LLQ abdominal pain x 2 weeks. At that time pain was well localized, non-radiating, 7/10 intensity, sharp in nature with no alleviating factors. He has no prior history of colonoscopy. No family history of colon ca or IBD. He continue to have BM, normal formed stools, no blood or mucus on stools. Denies fever, chills, SOB, chest pain or any other symptoms.    In the ED CT imaging revealed a sigmoid diverticulitis with microperforation, no abscess. Patient evaluated by colorectal surgery team and started on IV antibiotics. On hospital day 2 abdominal pain improved and patient was advanced to a FLD. He was advanced to a regular diet and continued to tolerate well without nausea, vomitting, or pain. Patient to have colonoscopy as outpatient. Clinically stable for discharge.

## 2019-12-20 NOTE — DISCHARGE NOTE PROVIDER - NSDCMRMEDTOKEN_GEN_ALL_CORE_FT
Bedside shift change report given to [facility] (oncoming nurse) by Niki Bartlett (offgoing nurse). Report included the following information SBAR, Kardex, MAR and Recent Results. acetaminophen 325 mg oral tablet: 2 tab(s) orally every 6 hours  amoxicillin-clavulanate 875 mg-125 mg oral tablet: 1 tab(s) orally 2 times a day MDD:2 TAB  FLUoxetine 20 mg oral tablet: 1 tab(s) orally once a day  gabapentin 300 mg oral capsule: 1 cap(s) orally 2 times a day  ibuprofen 400 mg oral tablet: 1 tab(s) orally 3 times a day, As needed, pain  lidocaine 5% topical film: Apply topically to affected area once a day  Oxygen home fill and concentrator with refill system, 2 liter with nasal cannula at night time, lenth of need 99days : ICD 10: R09.02   pantoprazole 40 mg oral delayed release tablet: 1 tab(s) orally once a day (before a meal)  simvastatin 40 mg oral tablet: 1 tab(s) orally once a day (at bedtime)  traZODone 100 mg oral tablet: 2 tab(s) orally once a day (at bedtime)  valsartan-hydrochlorothiazide 160mg-12.5mg oral tablet: 1 tab(s) orally once a day

## 2019-12-20 NOTE — PROGRESS NOTE ADULT - ASSESSMENT
59yoM with 1st episode diverticulitis.  - Continue zosyn  - COLLEEN  - f/u WBC  - med rec ([facility])  - dvt ppx  - remain NPO while clinically tender

## 2019-12-20 NOTE — DISCHARGE NOTE PROVIDER - NSDCCPCAREPLAN_GEN_ALL_CORE_FT
PRINCIPAL DISCHARGE DIAGNOSIS  Diagnosis: Diverticulitis  Assessment and Plan of Treatment: Follow up: Please call and make an appointment with the Colorectal Surgery clinic in 7-10 days after discharge. Also, please call and make an appointment with your primary care physician as per your usual schedule.   Activity: May return to normal activities as tolerated, however refrain from heavy lifting > 10-15 lbs.  Diet: May continue regular diet.  Medications: Please take all medications listed on your discharge paperwork as prescribed. Pain medication has been prescribed for you. Please, take it as it has been prescribed, do not drive or operate heavy machinery while taking narcotics.  You are encouraged to take over-the-counter tylenol and/or ibuprofen for pain relief when you feel your pain no longer warrants the use of narcotic pain medications, however DO NOT TAKE percocet and tylenol at the same time as they contain the same active ingredient (acetaminophen). Take only percocet OR tylenol.  Patient is advised to RETURN TO THE EMERGENCY DEPARTMENT for any of the following - worsening pain, fever/chills, nausea/vomiting, altered mental status, chest pain, shortness of breath, or any other new / worsening symptom. PRINCIPAL DISCHARGE DIAGNOSIS  Diagnosis: Diverticulitis  Assessment and Plan of Treatment: Follow up: Please call and make an appointment with the Colorectal Surgery clinic in 7-10 days after discharge. Also, please call and make an appointment with your primary care physician as per your usual schedule. Schedule a colonoscopy for 6-8 weeks after discharge.   Activity: May return to normal activities as tolerated, however refrain from heavy lifting > 10-15 lbs.  Diet: May continue regular diet.  Medications: Please take all medications listed on your discharge paperwork as prescribed. Pain medication has been prescribed for you. Please, take it as it has been prescribed, do not drive or operate heavy machinery while taking narcotics.  You are encouraged to take over-the-counter tylenol and/or ibuprofen for pain relief when you feel your pain no longer warrants the use of narcotic pain medications, however DO NOT TAKE percocet and tylenol at the same time as they contain the same active ingredient (acetaminophen). Take only percocet OR tylenol.  Patient is advised to RETURN TO THE EMERGENCY DEPARTMENT for any of the following - worsening pain, fever/chills, nausea/vomiting, altered mental status, chest pain, shortness of breath, or any other new / worsening symptom.

## 2019-12-20 NOTE — DISCHARGE NOTE PROVIDER - CARE PROVIDER_API CALL
Veronica Carvalho)  ColonRectal Surgery; Surgery  [address] [place] [address]  Phone: [phone]  Fax: [phone]  Follow Up Time: Veronica Carvalho)  ColonRectal Surgery; Surgery  [address] [place] [address]  Phone: [phone]  Fax: [phone]  Follow Up Time: 2 weeks

## 2019-12-20 NOTE — PROGRESS NOTE ADULT - ATTENDING COMMENTS
Patient seen and examined. Pain improved. No bowel function. Denies fevers or chills. On exam, abdomen is soft and non tender. WBC normal May initiate full liquid diet. I anticipate continued improvement with antibiotics with plan to transition to oral and discharge home tomorrow. Recommend outpatient colonoscopy in 6-8 weeks has been set up .

## 2019-12-21 ENCOUNTER — TRANSCRIPTION ENCOUNTER (OUTPATIENT)
Age: 59
End: 2019-12-21

## 2019-12-21 VITALS
DIASTOLIC BLOOD PRESSURE: 80 MMHG | RESPIRATION RATE: 16 BRPM | SYSTOLIC BLOOD PRESSURE: 128 MMHG | TEMPERATURE: 99 F | HEART RATE: 72 BPM | OXYGEN SATURATION: 95 %

## 2019-12-21 LAB
ANION GAP SERPL CALC-SCNC: 14 MMOL/L — SIGNIFICANT CHANGE UP (ref 5–17)
BASOPHILS # BLD AUTO: 0.03 K/UL — SIGNIFICANT CHANGE UP (ref 0–0.2)
BASOPHILS NFR BLD AUTO: 0.4 % — SIGNIFICANT CHANGE UP (ref 0–2)
BUN SERPL-MCNC: 16 MG/DL — SIGNIFICANT CHANGE UP (ref 8–20)
CALCIUM SERPL-MCNC: 9.7 MG/DL — SIGNIFICANT CHANGE UP (ref 8.6–10.2)
CHLORIDE SERPL-SCNC: 101 MMOL/L — SIGNIFICANT CHANGE UP (ref 98–107)
CO2 SERPL-SCNC: 26 MMOL/L — SIGNIFICANT CHANGE UP (ref 22–29)
CREAT SERPL-MCNC: 1.15 MG/DL — SIGNIFICANT CHANGE UP (ref 0.5–1.3)
EOSINOPHIL # BLD AUTO: 0.42 K/UL — SIGNIFICANT CHANGE UP (ref 0–0.5)
EOSINOPHIL NFR BLD AUTO: 5.1 % — SIGNIFICANT CHANGE UP (ref 0–6)
GLUCOSE SERPL-MCNC: 97 MG/DL — SIGNIFICANT CHANGE UP (ref 70–115)
HCT VFR BLD CALC: 49.3 % — SIGNIFICANT CHANGE UP (ref 39–50)
HGB BLD-MCNC: 16.2 G/DL — SIGNIFICANT CHANGE UP (ref 13–17)
IMM GRANULOCYTES NFR BLD AUTO: 0.4 % — SIGNIFICANT CHANGE UP (ref 0–1.5)
LYMPHOCYTES # BLD AUTO: 1.28 K/UL — SIGNIFICANT CHANGE UP (ref 1–3.3)
LYMPHOCYTES # BLD AUTO: 15.5 % — SIGNIFICANT CHANGE UP (ref 13–44)
MAGNESIUM SERPL-MCNC: 2.3 MG/DL — SIGNIFICANT CHANGE UP (ref 1.6–2.6)
MCHC RBC-ENTMCNC: 29.7 PG — SIGNIFICANT CHANGE UP (ref 27–34)
MCHC RBC-ENTMCNC: 32.9 GM/DL — SIGNIFICANT CHANGE UP (ref 32–36)
MCV RBC AUTO: 90.3 FL — SIGNIFICANT CHANGE UP (ref 80–100)
MONOCYTES # BLD AUTO: 0.68 K/UL — SIGNIFICANT CHANGE UP (ref 0–0.9)
MONOCYTES NFR BLD AUTO: 8.2 % — SIGNIFICANT CHANGE UP (ref 2–14)
NEUTROPHILS # BLD AUTO: 5.82 K/UL — SIGNIFICANT CHANGE UP (ref 1.8–7.4)
NEUTROPHILS NFR BLD AUTO: 70.4 % — SIGNIFICANT CHANGE UP (ref 43–77)
PHOSPHATE SERPL-MCNC: 2.5 MG/DL — SIGNIFICANT CHANGE UP (ref 2.4–4.7)
PLATELET # BLD AUTO: 260 K/UL — SIGNIFICANT CHANGE UP (ref 150–400)
POTASSIUM SERPL-MCNC: 4 MMOL/L — SIGNIFICANT CHANGE UP (ref 3.5–5.3)
POTASSIUM SERPL-SCNC: 4 MMOL/L — SIGNIFICANT CHANGE UP (ref 3.5–5.3)
RBC # BLD: 5.46 M/UL — SIGNIFICANT CHANGE UP (ref 4.2–5.8)
RBC # FLD: 12.3 % — SIGNIFICANT CHANGE UP (ref 10.3–14.5)
SODIUM SERPL-SCNC: 141 MMOL/L — SIGNIFICANT CHANGE UP (ref 135–145)
WBC # BLD: 8.26 K/UL — SIGNIFICANT CHANGE UP (ref 3.8–10.5)
WBC # FLD AUTO: 8.26 K/UL — SIGNIFICANT CHANGE UP (ref 3.8–10.5)

## 2019-12-21 PROCEDURE — 83690 ASSAY OF LIPASE: CPT

## 2019-12-21 PROCEDURE — 81001 URINALYSIS AUTO W/SCOPE: CPT

## 2019-12-21 PROCEDURE — 80048 BASIC METABOLIC PNL TOTAL CA: CPT

## 2019-12-21 PROCEDURE — 99232 SBSQ HOSP IP/OBS MODERATE 35: CPT

## 2019-12-21 PROCEDURE — 93005 ELECTROCARDIOGRAM TRACING: CPT

## 2019-12-21 PROCEDURE — 96374 THER/PROPH/DIAG INJ IV PUSH: CPT

## 2019-12-21 PROCEDURE — 84100 ASSAY OF PHOSPHORUS: CPT

## 2019-12-21 PROCEDURE — 96375 TX/PRO/DX INJ NEW DRUG ADDON: CPT

## 2019-12-21 PROCEDURE — 80053 COMPREHEN METABOLIC PANEL: CPT

## 2019-12-21 PROCEDURE — 99285 EMERGENCY DEPT VISIT HI MDM: CPT | Mod: 25

## 2019-12-21 PROCEDURE — 36415 COLL VENOUS BLD VENIPUNCTURE: CPT

## 2019-12-21 PROCEDURE — 86803 HEPATITIS C AB TEST: CPT

## 2019-12-21 PROCEDURE — 74177 CT ABD & PELVIS W/CONTRAST: CPT

## 2019-12-21 PROCEDURE — 83735 ASSAY OF MAGNESIUM: CPT

## 2019-12-21 PROCEDURE — 85027 COMPLETE CBC AUTOMATED: CPT

## 2019-12-21 RX ADMIN — PIPERACILLIN AND TAZOBACTAM 25 GRAM(S): 4; .5 INJECTION, POWDER, LYOPHILIZED, FOR SOLUTION INTRAVENOUS at 10:48

## 2019-12-21 RX ADMIN — PANTOPRAZOLE SODIUM 40 MILLIGRAM(S): 20 TABLET, DELAYED RELEASE ORAL at 05:06

## 2019-12-21 RX ADMIN — VALSARTAN 160 MILLIGRAM(S): 80 TABLET ORAL at 05:06

## 2019-12-21 RX ADMIN — PIPERACILLIN AND TAZOBACTAM 25 GRAM(S): 4; .5 INJECTION, POWDER, LYOPHILIZED, FOR SOLUTION INTRAVENOUS at 01:43

## 2019-12-21 RX ADMIN — Medication 650 MILLIGRAM(S): at 11:11

## 2019-12-21 RX ADMIN — Medication 12.5 MILLIGRAM(S): at 05:06

## 2019-12-21 RX ADMIN — Medication 20 MILLIGRAM(S): at 13:09

## 2019-12-21 RX ADMIN — Medication 650 MILLIGRAM(S): at 12:15

## 2019-12-21 NOTE — PROGRESS NOTE ADULT - ASSESSMENT
59yoM with 1st episode diverticulitis.  - Continue zosyn  - COLLEEN  - f/u [facility]BC  - med rec ([facility])  - dvt ppx  - ADAT

## 2019-12-21 NOTE — DISCHARGE NOTE NURSING/CASE MANAGEMENT/SOCIAL WORK - NSDCPEEMAIL_GEN_ALL_CORE
[facility] for Tobacco Control email [email or web address]

## 2019-12-21 NOTE — DISCHARGE NOTE NURSING/CASE MANAGEMENT/SOCIAL WORK - PATIENT PORTAL LINK FT
You can access the FollowMyHealth Patient Portal offered by [facility] by registering at the following website: [email or web address]. By joining [facility]’s FollowMyHealth portal, you will also be able to view your health information using other applications (apps) compatible with our system.

## 2019-12-21 NOTE — PROGRESS NOTE ADULT - SUBJECTIVE AND OBJECTIVE BOX
INTERVAL HPI/OVERNIGHT EVENTS:    No acute overnight events reported. Patient seen this AM with no major complaints. Tolerating FLD, denies nausea, emesis. Pain well controlled with current regimen. AVSS      MEDICATIONS  (STANDING):  acetaminophen   Tablet .. 650 milliGRAM(s) Oral every 6 hours  enoxaparin Injectable 40 milliGRAM(s) SubCutaneous daily  FLUoxetine 20 milliGRAM(s) Oral daily  gabapentin 300 milliGRAM(s) Oral two times a day  hydrochlorothiazide 12.5 milliGRAM(s) Oral daily  pantoprazole    Tablet 40 milliGRAM(s) Oral before breakfast  piperacillin/tazobactam IVPB.. 3.375 Gram(s) IV Intermittent every 8 hours  simvastatin 40 milliGRAM(s) Oral at bedtime  traZODone 200 milliGRAM(s) Oral at bedtime  valsartan 160 milliGRAM(s) Oral daily    MEDICATIONS  (PRN):  ketorolac   Injectable 15 milliGRAM(s) IV Push every 6 hours PRN Moderate Pain (4 - 6)  ondansetron Injectable 4 milliGRAM(s) IV Push every 6 hours PRN Nausea      Vital Signs Last 24 Hrs  T(C): 36.4 (20 Dec 2019 23:35), Max: 36.8 (20 Dec 2019 15:45)  T(F): 97.6 (20 Dec 2019 23:35), Max: 98.3 (20 Dec 2019 15:45)  HR: 67 (21 Dec 2019 04:55) (56 - 85)  BP: 133/75 (21 Dec 2019 04:55) (120/76 - 156/89)  BP(mean): --  RR: 18 (20 Dec 2019 23:35) (18 - 18)  SpO2: 97% (20 Dec 2019 23:35) (93% - 97%)    PE  Gen: Not in acute distress  Pulm: Nonlabored breathing  CV: regular rate S1, S2  Abd: Soft, mildly tender, nondistended  Ext: No pitting edema B/L  Vasc: 2+ radial and PT pulses B/L  Neuro: AAOX3      I&O's Detail    19 Dec 2019 07:01  -  20 Dec 2019 07:00  --------------------------------------------------------  IN:    lactated ringers.: 1375 mL    Oral Fluid: 120 mL  Total IN: 1495 mL    OUT:  Total OUT: 0 mL    Total NET: 1495 mL      20 Dec 2019 07:01  -  21 Dec 2019 05:52  --------------------------------------------------------  IN:    IV PiggyBack: 450 mL    lactated ringers.: 750 mL    Oral Fluid: 240 mL    Solution: 100 mL  Total IN: 1540 mL    OUT:    Voided: 200 mL  Total OUT: 200 mL    Total NET: 1340 mL          LABS:                        14.9   7.67  )-----------( 216      ( 20 Dec 2019 07:58 )             43.9     12-    142  |  104  |  22.0<H>  ----------------------------<  82  3.9   |  26.0  |  1.02    Ca    8.7      20 Dec 2019 07:58  Phos  2.8     12-  Mg     2.1     12-20    TPro  8.1  /  Alb  4.5  /  TBili  0.8  /  DBili  x   /  AST  38  /  ALT  36  /  AlkPhos  83  12-19      Urinalysis Basic - ( 19 Dec 2019 08:08 )    Color: Yellow / Appearance: Clear / S.015 / pH: x  Gluc: x / Ketone: Negative  / Bili: Negative / Urobili: Negative mg/dL   Blood: x / Protein: Negative mg/dL / Nitrite: Negative   Leuk Esterase: Negative / RBC: 0-2 /HPF / WBC 0-2   Sq Epi: x / Non Sq Epi: Occasional / Bacteria: Occasional        RADIOLOGY & ADDITIONAL STUDIES:

## 2019-12-21 NOTE — DISCHARGE NOTE NURSING/CASE MANAGEMENT/SOCIAL WORK - NSDCPEWEB_GEN_ALL_CORE
[facility] for Tobacco Control website --- [email or web address] --- [email or web address]

## 2019-12-21 NOTE — PROGRESS NOTE ADULT - ATTENDING COMMENTS
Seen and examined.  Whitney low fiber diet this AM.  Passing gas and stool.  Pain improved, reports 2/10.  AFVSS  NAD  soft, NT, mild distended  Labs reviewed  A/P - Sigmoid diverticulitis, improved  Discussed with pt and wife at bedside plans to followup in office for colonoscopy scheduling.  Augmentin for 1 week.

## 2019-12-31 ENCOUNTER — APPOINTMENT (OUTPATIENT)
Dept: COLORECTAL SURGERY | Facility: CLINIC | Age: 59
End: 2019-12-31
Payer: COMMERCIAL

## 2019-12-31 VITALS
DIASTOLIC BLOOD PRESSURE: 69 MMHG | BODY MASS INDEX: 29.49 KG/M2 | HEART RATE: 67 BPM | SYSTOLIC BLOOD PRESSURE: 113 MMHG | RESPIRATION RATE: 14 BRPM | HEIGHT: 65 IN | WEIGHT: 177 LBS

## 2019-12-31 DIAGNOSIS — Z87.19 PERSONAL HISTORY OF OTHER DISEASES OF THE DIGESTIVE SYSTEM: ICD-10-CM

## 2019-12-31 DIAGNOSIS — Z82.49 FAMILY HISTORY OF ISCHEMIC HEART DISEASE AND OTHER DISEASES OF THE CIRCULATORY SYSTEM: ICD-10-CM

## 2019-12-31 DIAGNOSIS — Z86.69 PERSONAL HISTORY OF OTHER DISEASES OF THE NERVOUS SYSTEM AND SENSE ORGANS: ICD-10-CM

## 2019-12-31 DIAGNOSIS — Z80.0 FAMILY HISTORY OF MALIGNANT NEOPLASM OF DIGESTIVE ORGANS: ICD-10-CM

## 2019-12-31 PROCEDURE — 99214 OFFICE O/P EST MOD 30 MIN: CPT

## 2019-12-31 NOTE — HISTORY OF PRESENT ILLNESS
[FreeTextEntry1] : Mr. Lorenz presents to the office for followup after hospitalization at  for perforated but contained diverticulitis 12/19-12/21/19. He has since completed his antibiotic course and denies any abdominal pain. He is tolerating food without any nausea or vomiting or abdominal distention. He continues to pass gas and stool without issues. He reports a strong family history of colon cancer on his mother's side, including his mother and uncle. No prior colonoscopies. Here for discussion of a screening colonoscopy after initial diverticulitis attack.

## 2019-12-31 NOTE — ASSESSMENT
[FreeTextEntry1] : Mr. Lorenz presents to the office for post hospitalization visit.\par He has recovered from his diverticulitis attack.\par Here for screening colonoscopy to evaluate site of disease in addition to strong family h/o colon cancer.\par The risks/benefits/alternatives for a colonoscopy were discussed. These include a less than 1% risk of bleeding should any polyps be biopsied and/or removed. There is also a less than 0.1% risk of perforation. The patient understands the need to adhere to a clear liquid diet the day prior to procedure as well as having to perform a bowel prep in order to allow for adequate visualization of the mucosal surfaces.  Followup colonoscopies will be scheduled based on the findings that are seen at the time of the procedure with baseline interval of 5 years. Patient understands and is agreeable, and will proceed with consent and scheduling.\par

## 2020-01-29 ENCOUNTER — APPOINTMENT (OUTPATIENT)
Dept: GASTROENTEROLOGY | Facility: CLINIC | Age: 60
End: 2020-01-29

## 2020-01-31 ENCOUNTER — APPOINTMENT (OUTPATIENT)
Dept: GASTROENTEROLOGY | Facility: CLINIC | Age: 60
End: 2020-01-31
Payer: COMMERCIAL

## 2020-01-31 VITALS
DIASTOLIC BLOOD PRESSURE: 79 MMHG | HEIGHT: 65 IN | SYSTOLIC BLOOD PRESSURE: 115 MMHG | WEIGHT: 180 LBS | OXYGEN SATURATION: 98 % | BODY MASS INDEX: 29.99 KG/M2 | RESPIRATION RATE: 15 BRPM

## 2020-01-31 PROCEDURE — 99203 OFFICE O/P NEW LOW 30 MIN: CPT

## 2020-01-31 NOTE — PHYSICAL EXAM
[General Appearance - Alert] : alert [General Appearance - In No Acute Distress] : in no acute distress [Sclera] : the sclera and conjunctiva were normal [Extraocular Movements] : extraocular movements were intact [PERRL With Normal Accommodation] : pupils were equal in size, round, and reactive to light [Neck Appearance] : the appearance of the neck was normal [Oropharynx] : the oropharynx was normal [Outer Ear] : the ears and nose were normal in appearance [Neck Cervical Mass (___cm)] : no neck mass was observed [Thyroid Nodule] : there were no palpable thyroid nodules [Jugular Venous Distention Increased] : there was no jugular-venous distention [Thyroid Diffuse Enlargement] : the thyroid was not enlarged [Auscultation Breath Sounds / Voice Sounds] : lungs were clear to auscultation bilaterally [Heart Rate And Rhythm] : heart rate was normal and rhythm regular [Heart Sounds Gallop] : no gallops [Heart Sounds] : normal S1 and S2 [Heart Sounds Pericardial Friction Rub] : no pericardial rub [Murmurs] : no murmurs [Full Pulse] : the pedal pulses are present [Bowel Sounds] : normal bowel sounds [Edema] : there was no peripheral edema [Abdomen Tenderness] : non-tender [Abdomen Soft] : soft [Cervical Lymph Nodes Enlarged Anterior Bilaterally] : anterior cervical [Cervical Lymph Nodes Enlarged Posterior Bilaterally] : posterior cervical [Abdomen Mass (___ Cm)] : no abdominal mass palpated [Supraclavicular Lymph Nodes Enlarged Bilaterally] : supraclavicular [Axillary Lymph Nodes Enlarged Bilaterally] : axillary [Inguinal Lymph Nodes Enlarged Bilaterally] : inguinal [Femoral Lymph Nodes Enlarged Bilaterally] : femoral [No Spinal Tenderness] : no spinal tenderness [No CVA Tenderness] : no ~M costovertebral angle tenderness [Nail Clubbing] : no clubbing  or cyanosis of the fingernails [Motor Tone] : muscle strength and tone were normal [Musculoskeletal - Swelling] : no joint swelling seen [Abnormal Walk] : normal gait [Skin Turgor] : normal skin turgor [Skin Color & Pigmentation] : normal skin color and pigmentation [] : no rash [Sensation] : the sensory exam was normal to light touch and pinprick [No Focal Deficits] : no focal deficits [Oriented To Time, Place, And Person] : oriented to person, place, and time [Affect] : the affect was normal [Impaired Insight] : insight and judgment were intact

## 2020-01-31 NOTE — HISTORY OF PRESENT ILLNESS
[de-identified] : The patient arrived for consultation. He was recently admitted to [facility] for the abdominal pain. He was noted to have diverticulitis and small area of perforation. This was managed conservatively. He is doing very well clinically. He did not have any colonoscopy before. He is passing bowel movements regularly. His recent labs while he was admitted were fairly unremarkable.

## 2020-01-31 NOTE — ASSESSMENT
[FreeTextEntry1] : I recommended fiber supplementation on a daily basis. I also recommended a colonoscopy. The bowel preparation was discussed at length. Risks (including bleeding, pain, perforation, incomplete examination, splenic laceration, adverse reactions to medications, aspiration and death), benefits and alternatives were discussed. Patient is agreeable for the colonoscopy. The patient is medically optimized for the procedure. We will schedule the patient for the procedure. Bowel preparation was sent to the pharmacy.\par

## 2020-02-06 ENCOUNTER — APPOINTMENT (OUTPATIENT)
Dept: COLORECTAL SURGERY | Facility: AMBULATORY MEDICAL SERVICES | Age: 60
End: 2020-02-06

## 2020-04-01 ENCOUNTER — APPOINTMENT (OUTPATIENT)
Dept: GASTROENTEROLOGY | Facility: GI CENTER | Age: 60
End: 2020-04-01

## 2020-05-12 ENCOUNTER — APPOINTMENT (OUTPATIENT)
Dept: GASTROENTEROLOGY | Facility: CLINIC | Age: 60
End: 2020-05-12

## 2020-08-26 ENCOUNTER — APPOINTMENT (OUTPATIENT)
Dept: INTERNAL MEDICINE | Facility: CLINIC | Age: 60
End: 2020-08-26
Payer: COMMERCIAL

## 2020-08-26 VITALS
BODY MASS INDEX: 30.99 KG/M2 | DIASTOLIC BLOOD PRESSURE: 70 MMHG | WEIGHT: 186 LBS | TEMPERATURE: 97.6 F | SYSTOLIC BLOOD PRESSURE: 140 MMHG | HEIGHT: 65 IN

## 2020-08-26 DIAGNOSIS — E78.5 HYPERLIPIDEMIA, UNSPECIFIED: ICD-10-CM

## 2020-08-26 DIAGNOSIS — E55.9 VITAMIN D DEFICIENCY, UNSPECIFIED: ICD-10-CM

## 2020-08-26 PROCEDURE — 36415 COLL VENOUS BLD VENIPUNCTURE: CPT

## 2020-08-26 PROCEDURE — 99386 PREV VISIT NEW AGE 40-64: CPT | Mod: 25

## 2020-08-26 NOTE — HISTORY OF PRESENT ILLNESS
[FreeTextEntry1] : fist visit establish  [de-identified] : 59yo MALE WITH HX OF HTN HLD  AND  DIVERTICULITIS  WITH LAST BOUT IN DEC 2019 AND WAS IN THE HOSPITAL [place] SUPPOSED OT HAVE COLONOSCOPY BUT WITH COVID IT NEVER GOT DONE AND NWO WITH WHAT HE CALL A FLARE FOR A COUPLE OF WEEKS NO FEVERS NO CHILL NON TOXIC

## 2020-08-26 NOTE — PLAN
[FreeTextEntry1] : 61yo MALE WITH HX OF HTN HLD  AND  DIVERTICULITIS  WITH LAST BOUT IN DEC 2019 AND WAS IN THE HOSPITAL [place] SUPPOSED OT HAVE COLONOSCOPY BUT WITH COVID IT NEVER GOT DONE AND NOW WITH WHAT HE CALL A FLARE FOR A COUPLE OF WEEKS NO FEVERS NO CHILL NON TOXIC \par NO OBSTRUCTIVE SX  ABD EXAM MILD TENDERNESS NO REBOUND OR GUARDING\par WOULD SUGGEST LAB WORK NO DRAWN HERE\par WILL OBTAIN CT ABD PELVIS IF SX ARE WORSE GO TO THE ER \par NO FEVRS NO CHILLS\par WILL FOLLOWUP CONTINUE BP MEDS  \par PT HAS CARDIOLOGY HAS HAD RECENT EKG \par ALSO SEES DR [facility] WHO DOES HIS PSA

## 2020-08-26 NOTE — PROGRESS NOTE ADULT - PROBLEM SELECTOR PLAN 2
MD Tony Sandoval MD Zaida Kida, MD               Office: [phone]                      Fax: [phone]             0 [facility]                   NEPHROLOGY INPATIENT PROGRESS NOTE:     PATIENT NAME: Jesus Modi  : 1957  MRN: [id number]    IMPRESSION:       KACEY (on no CKD): crea worse today. 4.5L removed by paracentesis yesterday.    - BL Scr- 0.9  ---> 2.1 on admission. Improved to 1.1 but higher at 1.8 today.    : Etiology of current KACEY - prerenal vs. ATN vs. Hepatorenal physiology type 2. Abdominal compartment syndrome less likely with paracentesis done . Recheck Stu, pending. Hold Lasix and SPNL. Give albumin. Continue Midodrine. + severe leg edema + anasarca   : d/to lower albumin, liver disease  : unlikely NS  : US ABD:   Heterogeneous nodular appearance of the liver compatible with known cirrhosis. Gallbladder and common duct appear grossly unremarkable in appearance. Moderate amount of ascites.       : Na: Hyponatremia - d/to KACEY + cirrhosis - moderate - follow as stable. - Acid-Base: stable ,     - Anemia: mild        RECOMMENDATIONS:    - Albumin. Added Midodrine. Hold Lasix and SPNL. - not a good candidate for HD.   - GI following  - at higher risk for worsening needing close monitoring   - overall poor prognosis d/to liver disease       Other problems: Management per primary and other consulting teams.    Hospital Problems           Last Modified POA    * (Principal) Severe sepsis ([address] 2020 Yes    Cirrhosis ([address] 2020 Yes    Anasarca 2020 Yes    HCAP (healthcare-associated pneumonia) 2020 Yes    Acute kidney injury ([address] 2020 Yes    Anemia 2020 Yes    Thrombocytopenia ([address] 2020 Yes    Coagulopathy ([address] 9075 Yes    Alcoholic cirrhosis ([address] 1813 Yes    Hyponatremia 2020 Yes    Moderate malnutrition ([address] (Chronic) 2020 Yes    Pneumonia due to methicillin susceptible Staphylococcus aureus (MSSA) ([facility] 8/25/2020 Yes    SBP (spontaneous bacterial peritonitis) ([facility] 8/25/2020 Yes    Chronic hepatitis C without hepatic coma ([facility] 8/25/2020 Yes    History of alcoholism ([facility] 8/25/2020 Yes    Ex-smoker 8/25/2020 Yes        High Complexity. Multiple complex problems. Discussed with Medicine    Radha Harrison MD       [facility] [address] [place]: [phone] or Via [employer]  Fax: [phone]        ========================================================  Subjective:   Seen resting in bed today  Denies SOB  +Edema and abdominal distention    Past medical, Surgical, Social, Family medical history reviewed by me. MEDICATIONS: reviewed by me. Medications Prior to Admission:  No current facility-administered medications on file prior to encounter. Current Outpatient Medications on File Prior to Encounter   Medication Sig Dispense Refill    furosemide (LASIX) 80 MG tablet Take 1 tablet by mouth daily 30 tablet 3    spironolactone (ALDACTONE) 100 MG tablet Take 1 tablet by mouth daily 30 tablet 3    potassium chloride (KLOR-CON M) 20 MEQ extended release tablet Take 1 tablet by mouth daily 30 tablet 5       Medications Prior to Admission: furosemide (LASIX) 80 MG tablet, Take 1 tablet by mouth daily  spironolactone (ALDACTONE) 100 MG tablet, Take 1 tablet by mouth daily  potassium chloride (KLOR-CON M) 20 MEQ extended release tablet, Take 1 tablet by mouth daily     Scheduled Meds:   polyethylene glycol  17 g Oral BID    piperacillin-tazobactam  3.375 g Intravenous Q8H    fluconazole  200 mg Intravenous Q24H    midodrine  5 mg Oral TID WC    ipratropium-albuterol  1 ampule Inhalation Q4H WA    sodium chloride flush  10 mL Intravenous 2 times per day     Continuous Infusions:  PRN Meds:.     REVIEW OF SYSTEMS:  As mentioned in chief complaint and HPI , Subjective       PHYSICAL EXAM:  Recent vital signs and recent I/Os reviewed by me. Wt Readings from Last 3 Encounters:   08/26/20 210 lb 8 oz (95.5 kg)   08/13/20 229 lb 6.4 oz (104.1 kg)   08/06/20 228 lb (103.4 kg)     BP Readings from Last 3 Encounters:   08/26/20 108/66   08/13/20 112/78   08/06/20 97/73     Patient Vitals for the past 24 hrs:   BP Temp Temp src Pulse Resp SpO2 Weight   08/26/20 1145 -- -- -- -- 16 93 % --   08/26/20 0915 108/66 98.3 °F (36.8 °C) Oral 90 16 94 % --   08/26/20 0834 -- -- -- -- 16 94 % --   08/26/20 0749 -- -- -- -- -- -- 210 lb 8 oz (95.5 kg)   08/26/20 0530 109/63 98.6 °F (37 °C) Oral 86 16 94 % --   08/26/20 0200 116/65 98.7 °F (37.1 °C) Oral 86 16 94 % --   08/26/20 0045 116/71 98.3 °F (36.8 °C) Oral 86 18 94 % --   08/25/20 2219 125/73 98.5 °F (36.9 °C) Oral 92 18 96 % --   08/25/20 2105 -- -- -- -- -- 96 % --   08/25/20 1646 -- -- -- -- 16 94 % --   08/25/20 1615 101/65 98.6 °F (37 °C) Oral 83 16 95 % --   08/25/20 1225 -- -- -- -- 16 94 % --       Intake/Output Summary (Last 24 hours) at 8/26/2020 1158  Last data filed at 8/26/2020 0915  Gross per 24 hour   Intake 1434 ml   Output 300 ml   Net 1134 ml          Physical exam:  General: Awake, Alert,   HENT: Atraumatic, normocephalic   Eyes: Normal conjunctiva, Non-incteral sclera. Neck: Supple, JVD not visible. CVS:  Heart sounds are normal. No murmurs. No pericardial rub. RS: Normal respiratory efforts,  Lung fields are clear.    Abd: Soft , bowel sounds are normal, distended  Skin: No rash ,  bruises,   CNS: Awake Oriented , No focal deficits  Extremities/MSK: 3+ Edema      DATA:  Diagnostic tests reviewed by me for today's visit:    Recent Labs     08/24/20  0504 08/25/20  0516 08/26/20  0522   WBC 17.6* 15.2* 15.6*   HCT 27.4* 25.3* 24.3*   PLT 90* 81* 87*     Iron Saturation:  No components found for: PERCENTFE  FERRITIN:    Lab Results   Component Value Date    FERRITIN 864.6 08/18/2020     IRON:    Lab Results   Component Value Date    IRON 137 08/07/2018     TIBC:    Lab Results   Component Value Date    TIBC see below 08/07/2018       Recent Labs     08/24/20  0504 08/25/20  0516 08/26/20  0522   * 133* 132*   K 3.7 3.9 3.5    99 98*   CO2 25 24 24   BUN 27* 28* 33*   CREATININE 1.1 1.5* 1.8*     Recent Labs     08/24/20  0504 08/25/20  0516 08/26/20  0522   CALCIUM 8.1* 7.7* 7.9*   MG  --   --  1.50*     No results for input(s): PH, PCO2, PO2 in the last 72 hours.     Invalid input(s): [place]    ABG:  No results found for: PH, PCO2, PO2, HCO3, BE, THGB, TCO2, O2SAT  VBG:  No results found for: PHVEN, ZML9PQK, BEVEN, P9NPMKFZ    LDH:    Lab Results   Component Value Date     08/18/2020     Uric Acid:    Lab Results   Component Value Date    LABURIC 8.1 08/17/2020       PT/INR:    Lab Results   Component Value Date    PROTIME 30.9 08/26/2020    INR 2.64 08/26/2020     Warfarin PT/INR:  No components found for: PTPATWAR, PTINRWAR  PTT:    Lab Results   Component Value Date    APTT 63.4 08/26/2020   [APTT}  Last 3 Troponin:    Lab Results   Component Value Date    TROPONINI <0.01 08/17/2020    TROPONINI <0.01 04/18/2019    TROPONINI <0.01 08/06/2018       U/A:    Lab Results   Component Value Date    COLORU Dark yellow 08/26/2020    PROTEINU 30 08/26/2020    PHUR 5.5 08/26/2020    WBCUA 14 08/26/2020    RBCUA 27 08/26/2020    MUCUS 3+ 08/06/2018    TRICHOMONAS Present 08/17/2020    BACTERIA 3+ 08/06/2018    CLARITYU Clear 08/26/2020    SPECGRAV 1.020 08/26/2020    LEUKOCYTESUR Negative 08/26/2020    UROBILINOGEN 0.2 08/26/2020    BILIRUBINUR MODERATE 08/26/2020    BLOODU SMALL 08/26/2020    GLUCOSEU Negative 08/26/2020    AMORPHOUS 2+ 08/06/2018     Microalbumen/Creatinine ratio:  No components found for: RUCREAT  24 Hour Urine for Protein:  No components found for: RAWUPRO, UHRS3, SLPQ22PS, UTV3  24 Hour Urine for Creatinine Clearance:  No components found for: CREAT4, UHRS10, UTV10  Urine Toxicology:  No components found for: IAMMENTA, IBARBIT, Joann Rodriguez, IMARTHC, IOPIATES, IPHENCYC    HgBA1c:  No results found for: LABA1C  RPR:  No results found for: RPR  HIV:  No results found for: HIV  AZAR:    Lab Results   Component Value Date    AZAR Negative 08/07/2018     RF:  No results found for: RF  DSDNA:  No components found for: DNA  AMYLASE:  No results found for: AMYLASE  LIPASE:    Lab Results   Component Value Date    LIPASE 12.0 08/17/2020     Fibrinogen Level:  No components found for: FIB       BELOW MENTIONED RADIOLOGY STUDY RESULTS BY ME:    Xr Chest Portable    Result Date: 8/17/2020  EXAMINATION: ONE XRAY VIEW OF THE CHEST 8/17/2020 6:23 pm COMPARISON: 04/22/2019 HISTORY: ORDERING SYSTEM PROVIDED HISTORY: general fatigue TECHNOLOGIST PROVIDED HISTORY: Reason for exam:->general fatigue Reason for Exam: vLeg Swelling (Pt c/o pain to bilateral lower extremities with gross amount of swelling. Denies fever. reports decreased appetite. Hx liver failure. reports not taking prescribed medications because they havent been filled by the pharmacy. Gross abdominal swelling noted as well) Acuity: Unknown Type of Exam: Unknown FINDINGS: Lung volumes are low accentuating heart size and bronchovascular markings at the lung bases. There is left-sided pleural effusion, likely loculated, with adjacent left lung consolidation, increased compared to prior. Volume loss is also seen in the left hemithorax Hazy right basilar opacity is seen. Increasing pleuroparenchymal disease on the left. Ir Us Guided Paracentesis    Result Date: 8/13/2020  PROCEDURE: ULTRASOUND GUIDED PARACENTESIS 8/13/2020 HISTORY: ORDERING SYSTEM PROVIDED HISTORY: Ascites due to alcoholic cirrhosis ([facility] [address] TECHNIQUE: Informed consent was obtained after a detailed explanation of the procedure including risks, benefits, and alternatives. Universal protocol was followed. The right abdomen was prepped and draped in sterile fashion and local anesthesia was achieved with lidocaine.   A 5 [facility] needle sheath was advanced under ultrasound guidance into ascites and paracentesis was performed. The patient tolerated the procedure well. FINDINGS: A total of 5.5 L of yellow ascites was removed. Successful ultrasound guided paracentesis. Ir Us Guided Paracentesis    Result Date: 8/6/2020  PROCEDURE: ULTRASOUND GUIDED PARACENTESIS 8/6/2020 HISTORY: ORDERING SYSTEM PROVIDED HISTORY: Ascites due to alcoholic cirrhosis ([facility] [address] TECHNIQUE: Informed consent was obtained after a detailed explanation of the procedure including risks, benefits, and alternatives. Universal protocol was followed. The right abdomen was prepped and draped in sterile fashion and local anesthesia was achieved with lidocaine. An 5 [place] needle sheath was advanced under ultrasound guidance into ascites and paracentesis was performed. The patient tolerated the procedure well. 5 L removed. FINDINGS: A total of 5 L clear yellow fluid removed. Was removed. Successful ultrasound guided paracentesis. Ir Us Guided Paracentesis    Result Date: 7/30/2020  PROCEDURE: ULTRASOUND GUIDED PARACENTESIS 7/30/2020 HISTORY: ORDERING SYSTEM PROVIDED HISTORY: Ascites due to alcoholic cirrhosis ([facility] [address] TECHNIQUE: Informed consent was obtained after a detailed explanation of the procedure including risks, benefits, and alternatives. Universal protocol was followed. The right abdomen was prepped and draped in sterile fashion and local anesthesia was achieved with lidocaine. A 5 [place] needle sheath was advanced under ultrasound guidance into ascites and paracentesis was performed. The patient tolerated the procedure well. Estimated blood loss: Less than 5 cc FINDINGS: A total of 5.6 L was removed. Successful ultrasound guided paracentesis.      Berlin Moore MD Remains off of pressors.  BP stable, no further epidsodes of hypotension.  Keep MAP>65.  Continue abx coverage.  f/u cultures for ID/sensitivity.  Maintain Lovenox for DVT prophylaxis and protonix for GI ulcer until eating diet fully.

## 2020-08-26 NOTE — PHYSICAL EXAM
[No Acute Distress] : no acute distress [Well Nourished] : well nourished [Well Developed] : well developed [Well-Appearing] : well-appearing [Normal Sclera/Conjunctiva] : normal sclera/conjunctiva [PERRL] : pupils equal round and reactive to light [EOMI] : extraocular movements intact [Normal Outer Ear/Nose] : the outer ears and nose were normal in appearance [Normal Oropharynx] : the oropharynx was normal [No JVD] : no jugular venous distention [No Lymphadenopathy] : no lymphadenopathy [Supple] : supple [Thyroid Normal, No Nodules] : the thyroid was normal and there were no nodules present [No Respiratory Distress] : no respiratory distress  [No Accessory Muscle Use] : no accessory muscle use [Clear to Auscultation] : lungs were clear to auscultation bilaterally [Normal Rate] : normal rate  [Regular Rhythm] : with a regular rhythm [No Murmur] : no murmur heard [Normal S1, S2] : normal S1 and S2 [No Carotid Bruits] : no carotid bruits [No Abdominal Bruit] : a ~M bruit was not heard ~T in the abdomen [Pedal Pulses Present] : the pedal pulses are present [No Varicosities] : no varicosities [No Edema] : there was no peripheral edema [No Palpable Aorta] : no palpable aorta [Soft] : abdomen soft [No Extremity Clubbing/Cyanosis] : no extremity clubbing/cyanosis [No Masses] : no abdominal mass palpated [Non-distended] : non-distended [Normal Bowel Sounds] : normal bowel sounds [No HSM] : no HSM [Normal Posterior Cervical Nodes] : no posterior cervical lymphadenopathy [Normal Anterior Cervical Nodes] : no anterior cervical lymphadenopathy [No CVA Tenderness] : no CVA  tenderness [No Spinal Tenderness] : no spinal tenderness [Grossly Normal Strength/Tone] : grossly normal strength/tone [No Joint Swelling] : no joint swelling [No Rash] : no rash [No Focal Deficits] : no focal deficits [Coordination Grossly Intact] : coordination grossly intact [Normal Affect] : the affect was normal [Normal Gait] : normal gait [Normal Insight/Judgement] : insight and judgment were intact [de-identified] : MILD TENDERNESS BOTH LEFT AND RIGHT LOWER QUADRANT  REDUCIBLE UMBILICAL HERNIA

## 2020-09-01 LAB
25(OH)D3 SERPL-MCNC: 49.6 NG/ML
ALBUMIN SERPL ELPH-MCNC: 5.1 G/DL
ALP BLD-CCNC: 70 U/L
ALT SERPL-CCNC: 53 U/L
ANION GAP SERPL CALC-SCNC: 14 MMOL/L
AST SERPL-CCNC: 33 U/L
BASOPHILS # BLD AUTO: 0.04 K/UL
BASOPHILS NFR BLD AUTO: 0.5 %
BILIRUB SERPL-MCNC: 0.7 MG/DL
BUN SERPL-MCNC: 22 MG/DL
CALCIUM SERPL-MCNC: 10.5 MG/DL
CHLORIDE SERPL-SCNC: 100 MMOL/L
CHOLEST SERPL-MCNC: 167 MG/DL
CHOLEST/HDLC SERPL: 5.1 RATIO
CO2 SERPL-SCNC: 28 MMOL/L
CREAT SERPL-MCNC: 1.17 MG/DL
EOSINOPHIL # BLD AUTO: 0.37 K/UL
EOSINOPHIL NFR BLD AUTO: 5 %
ESTIMATED AVERAGE GLUCOSE: 111 MG/DL
GLUCOSE SERPL-MCNC: 80 MG/DL
HBA1C MFR BLD HPLC: 5.5 %
HCT VFR BLD CALC: 52.7 %
HDLC SERPL-MCNC: 33 MG/DL
HGB BLD-MCNC: 16.6 G/DL
IMM GRANULOCYTES NFR BLD AUTO: 0.4 %
LDLC SERPL CALC-MCNC: 96 MG/DL
LYMPHOCYTES # BLD AUTO: 2.2 K/UL
LYMPHOCYTES NFR BLD AUTO: 29.6 %
MAN DIFF?: NORMAL
MCHC RBC-ENTMCNC: 30.1 PG
MCHC RBC-ENTMCNC: 31.5 GM/DL
MCV RBC AUTO: 95.5 FL
MONOCYTES # BLD AUTO: 0.75 K/UL
MONOCYTES NFR BLD AUTO: 10.1 %
NEUTROPHILS # BLD AUTO: 4.05 K/UL
NEUTROPHILS NFR BLD AUTO: 54.4 %
PLATELET # BLD AUTO: 253 K/UL
POTASSIUM SERPL-SCNC: 4.8 MMOL/L
PROT SERPL-MCNC: 7.3 G/DL
RBC # BLD: 5.52 M/UL
RBC # FLD: 13.5 %
SODIUM SERPL-SCNC: 143 MMOL/L
T4 SERPL-MCNC: 7.6 UG/DL
TRIGL SERPL-MCNC: 194 MG/DL
TSH SERPL-ACNC: 3.08 UIU/ML
WBC # FLD AUTO: 7.44 K/UL

## 2020-09-12 ENCOUNTER — OUTPATIENT (OUTPATIENT)
Dept: OUTPATIENT SERVICES | Facility: HOSPITAL | Age: 60
LOS: 1 days | End: 2020-09-12

## 2020-09-12 ENCOUNTER — RESULT REVIEW (OUTPATIENT)
Age: 60
End: 2020-09-12

## 2020-09-12 ENCOUNTER — APPOINTMENT (OUTPATIENT)
Dept: CT IMAGING | Facility: CLINIC | Age: 60
End: 2020-09-12
Payer: COMMERCIAL

## 2020-09-12 DIAGNOSIS — Z00.8 ENCOUNTER FOR OTHER GENERAL EXAMINATION: ICD-10-CM

## 2020-09-12 PROCEDURE — 74177 CT ABD & PELVIS W/CONTRAST: CPT | Mod: 26

## 2020-10-06 ENCOUNTER — APPOINTMENT (OUTPATIENT)
Dept: GASTROENTEROLOGY | Facility: CLINIC | Age: 60
End: 2020-10-06
Payer: COMMERCIAL

## 2020-10-06 VITALS
TEMPERATURE: 98.1 F | DIASTOLIC BLOOD PRESSURE: 87 MMHG | OXYGEN SATURATION: 97 % | RESPIRATION RATE: 16 BRPM | HEIGHT: 65 IN | WEIGHT: 182 LBS | SYSTOLIC BLOOD PRESSURE: 141 MMHG | BODY MASS INDEX: 30.32 KG/M2 | HEART RATE: 90 BPM

## 2020-10-06 PROCEDURE — S0285 CNSLT BEFORE SCREEN COLONOSC: CPT

## 2020-10-06 NOTE — ASSESSMENT
[FreeTextEntry1] : At this time, I am recommending to continue the fiber supplementation.I have recommended a colonoscopy. The bowel preparation was discussed at length. Risks (including bleeding, pain, perforation, incomplete examination, splenic laceration, adverse reactions to medications, aspiration and death), benefits and alternatives were discussed. Patient is agreeable for the colonoscopy. The patient is medically optimized for the procedure. We will schedule the patient for the procedure. Bowel preparation was sent to the pharmacy.\par \par Cuco Aguilar MD\par Gastroenterology \par \par

## 2020-10-06 NOTE — PHYSICAL EXAM
[General Appearance - Alert] : alert [General Appearance - In No Acute Distress] : in no acute distress [Sclera] : the sclera and conjunctiva were normal [PERRL With Normal Accommodation] : pupils were equal in size, round, and reactive to light [Extraocular Movements] : extraocular movements were intact [Outer Ear] : the ears and nose were normal in appearance [Oropharynx] : the oropharynx was normal [Neck Appearance] : the appearance of the neck was normal [Neck Cervical Mass (___cm)] : no neck mass was observed [Jugular Venous Distention Increased] : there was no jugular-venous distention [Thyroid Diffuse Enlargement] : the thyroid was not enlarged [Thyroid Nodule] : there were no palpable thyroid nodules [Auscultation Breath Sounds / Voice Sounds] : lungs were clear to auscultation bilaterally [Heart Rate And Rhythm] : heart rate was normal and rhythm regular [Heart Sounds] : normal S1 and S2 [Heart Sounds Gallop] : no gallops [Murmurs] : no murmurs [Heart Sounds Pericardial Friction Rub] : no pericardial rub [Full Pulse] : the pedal pulses are present [Edema] : there was no peripheral edema [Bowel Sounds] : normal bowel sounds [Abdomen Soft] : soft [Abdomen Tenderness] : non-tender [Abdomen Mass (___ Cm)] : no abdominal mass palpated [Cervical Lymph Nodes Enlarged Posterior Bilaterally] : posterior cervical [Cervical Lymph Nodes Enlarged Anterior Bilaterally] : anterior cervical [Supraclavicular Lymph Nodes Enlarged Bilaterally] : supraclavicular [Axillary Lymph Nodes Enlarged Bilaterally] : axillary [Femoral Lymph Nodes Enlarged Bilaterally] : femoral [Inguinal Lymph Nodes Enlarged Bilaterally] : inguinal [No CVA Tenderness] : no ~M costovertebral angle tenderness [No Spinal Tenderness] : no spinal tenderness [Abnormal Walk] : normal gait [Nail Clubbing] : no clubbing  or cyanosis of the fingernails [Musculoskeletal - Swelling] : no joint swelling seen [Motor Tone] : muscle strength and tone were normal [Skin Color & Pigmentation] : normal skin color and pigmentation [Skin Turgor] : normal skin turgor [] : no rash [Sensation] : the sensory exam was normal to light touch and pinprick [No Focal Deficits] : no focal deficits [Oriented To Time, Place, And Person] : oriented to person, place, and time [Impaired Insight] : insight and judgment were intact [Affect] : the affect was normal

## 2020-10-31 ENCOUNTER — APPOINTMENT (OUTPATIENT)
Dept: CT IMAGING | Facility: CLINIC | Age: 60
End: 2020-10-31

## 2020-10-31 ENCOUNTER — OUTPATIENT (OUTPATIENT)
Dept: OUTPATIENT SERVICES | Facility: HOSPITAL | Age: 60
LOS: 1 days | End: 2020-10-31
Payer: COMMERCIAL

## 2020-10-31 ENCOUNTER — RESULT REVIEW (OUTPATIENT)
Age: 60
End: 2020-10-31

## 2020-10-31 DIAGNOSIS — Z00.8 ENCOUNTER FOR OTHER GENERAL EXAMINATION: ICD-10-CM

## 2020-10-31 PROCEDURE — 74176 CT ABD & PELVIS W/O CONTRAST: CPT | Mod: 26

## 2020-11-10 DIAGNOSIS — Z01.818 ENCOUNTER FOR OTHER PREPROCEDURAL EXAMINATION: ICD-10-CM

## 2020-11-13 ENCOUNTER — APPOINTMENT (OUTPATIENT)
Dept: DISASTER EMERGENCY | Facility: CLINIC | Age: 60
End: 2020-11-13

## 2020-11-16 ENCOUNTER — APPOINTMENT (OUTPATIENT)
Dept: GASTROENTEROLOGY | Facility: GI CENTER | Age: 60
End: 2020-11-16

## 2020-11-21 NOTE — PATIENT PROFILE ADULT. - LIVING ARRANGEMENTS, TEMPORARY FAMILY, PROFILE
Patient states she's noticed a moderate amount of bright red blood to stool x 2 months.  Denies pain or injury
none required

## 2020-12-17 ENCOUNTER — NON-APPOINTMENT (OUTPATIENT)
Age: 60
End: 2020-12-17

## 2021-02-12 NOTE — PHYSICAL THERAPY INITIAL EVALUATION ADULT - ASR WT BEARING STATUS EVAL
57-year-old male patient with a history of methamphetamine and heroin abuse presents to the emergency department after using methamphetamine for the past several days. He also admits to first use of heroin yesterday. He presents with reported palpitations and odd feeling. Also voices concern for pain over the right forearm where he feels he may have missed a vein. His last use of methamphetamine occurred at 4 AM this morning. He has not slept in 3 days. [facility] states [facility] prior to finishing treatment. He wishes to go back. Patient also admits to alcohol use occasionally. He denies any other drug use. Denies any other medical history. He denies suicidal or homicidal ideation. No past medical history on file. No past surgical history on file. No family history on file.     Social History     Socioeconomic History    Marital status: LEGALLY      Spouse name: Not on file    Number of children: Not on file    Years of education: Not on file    Highest education level: Not on file   Occupational History    Not on file   Social Needs    Financial resource strain: Not on file    Food insecurity     Worry: Not on file     Inability: Not on file    Transportation needs     Medical: Not on file     Non-medical: Not on file   Tobacco Use    Smoking status: Not on file   Substance and Sexual Activity    Alcohol use: Not on file    Drug use: Not on file    Sexual activity: Not on file   Lifestyle    Physical activity     Days per week: Not on file     Minutes per session: Not on file    Stress: Not on file   Relationships    Social connections     Talks on phone: Not on file     Gets together: Not on file     Attends Sikh service: Not on file     Active member of club or organization: Not on file     Attends meetings of clubs or organizations: Not on file     Relationship status: Not on file  Intimate partner violence     Fear of current or ex partner: Not on file     Emotionally abused: Not on file     Physically abused: Not on file     Forced sexual activity: Not on file   Other Topics Concern    Not on file   Social History Narrative    Not on file         ALLERGIES: Patient has no known allergies. Review of Systems   Constitutional: Negative for chills, diaphoresis and fever. HENT: Negative for congestion, sneezing and sore throat. Eyes: Negative for visual disturbance. Respiratory: Negative for cough, chest tightness, shortness of breath and wheezing. Cardiovascular: Positive for palpitations. Negative for chest pain and leg swelling. Gastrointestinal: Negative for abdominal pain, blood in stool, diarrhea, nausea and vomiting. Endocrine: Negative for polyuria. Genitourinary: Negative for difficulty urinating, dysuria, flank pain, hematuria and urgency. Musculoskeletal: Negative for back pain, myalgias, neck pain and neck stiffness. Skin: Negative for color change and rash. Neurological: Negative for dizziness, syncope, speech difficulty, weakness, light-headedness, numbness and headaches. Psychiatric/Behavioral: Positive for sleep disturbance. Negative for behavioral problems. The patient is nervous/anxious. All other systems reviewed and are negative. Vitals:    02/12/21 0914   BP: (!) 171/81   Pulse: (!) 114   Resp: 16   Temp: 100.1 °F (37.8 °C)   SpO2: 98%   Weight: 75.3 kg (166 lb)   Height: 6' 1\" (1.854 m)            Physical Exam  Vitals signs and nursing note reviewed. Constitutional:       General: He is not in acute distress. Appearance: He is well-developed. He is not diaphoretic. Comments: Anxious though otherwise well-appearing male patient. Alert and oriented to person place and time. No acute distress, speaks in clear, fluid sentences. HENT:      Head: Normocephalic and atraumatic.       Right Ear: External ear normal.      Left Ear: External ear normal.      Nose: Nose normal.   Eyes:      Pupils: Pupils are equal, round, and reactive to light. Neck:      Musculoskeletal: Normal range of motion. Cardiovascular:      Rate and Rhythm: Regular rhythm. Tachycardia present. Heart sounds: Normal heart sounds. No murmur. No friction rub. No gallop. Pulmonary:      Effort: Pulmonary effort is normal. No respiratory distress. Breath sounds: Normal breath sounds. No stridor. No decreased breath sounds, wheezing, rhonchi or rales. Chest:      Chest wall: No tenderness. Abdominal:      General: There is no distension. Palpations: Abdomen is soft. There is no mass. Tenderness: There is no abdominal tenderness. There is no guarding or rebound. Hernia: No hernia is present. Musculoskeletal: Normal range of motion. General: No tenderness or deformity. Comments: Evaluation of the patient's right forearm, consistent with area of concern reveals injection site that does not appear swollen or erythematous. No evidence of cellulitic change. No palpable abnormality on exam.  Distal pulses intact. Skin:     General: Skin is warm and dry. Neurological:      Mental Status: He is alert and oriented to person, place, and time. Cranial Nerves: No cranial nerve deficit. MDM  Number of Diagnoses or Management Options  Diagnosis management comments: EKG obtained on arrival shows rapid tachycardia, otherwise unremarkable, no evidence of acute ischemic change. Tach tachycardia is likely secondary to recent methamphetamine use. Will treat symptomatically, check basic labs and contact [address] [place] [address] representative for assistance with recovery.        Amount and/or Complexity of Data Reviewed  Clinical lab tests: ordered and reviewed  Tests in the radiology section of CPT®: reviewed and ordered  Tests in the medicine section of CPT®: ordered and reviewed  Independent visualization of images, tracings, or specimens: yes    Risk of Complications, Morbidity, and/or Mortality  Presenting problems: moderate  Diagnostic procedures: low  Management options: moderate    Patient Progress  Patient progress: stable         Procedures no weight-bearing restrictions

## 2021-03-11 ENCOUNTER — RX RENEWAL (OUTPATIENT)
Age: 61
End: 2021-03-11

## 2021-04-22 ENCOUNTER — EMERGENCY (EMERGENCY)
Facility: HOSPITAL | Age: 61
LOS: 1 days | Discharge: HOME | End: 2021-04-22
Attending: EMERGENCY MEDICINE
Payer: COMMERCIAL

## 2021-04-22 ENCOUNTER — APPOINTMENT (OUTPATIENT)
Dept: INTERNAL MEDICINE | Facility: CLINIC | Age: 61
End: 2021-04-22
Payer: COMMERCIAL

## 2021-04-22 VITALS
BODY MASS INDEX: 29.03 KG/M2 | HEIGHT: 67 IN | WEIGHT: 185 LBS | DIASTOLIC BLOOD PRESSURE: 83 MMHG | SYSTOLIC BLOOD PRESSURE: 111 MMHG | TEMPERATURE: 97.2 F

## 2021-04-22 VITALS
OXYGEN SATURATION: 99 % | DIASTOLIC BLOOD PRESSURE: 87 MMHG | HEART RATE: 86 BPM | WEIGHT: 184.97 LBS | TEMPERATURE: 98 F | RESPIRATION RATE: 18 BRPM | SYSTOLIC BLOOD PRESSURE: 129 MMHG | HEIGHT: 67 IN

## 2021-04-22 DIAGNOSIS — Z00.00 ENCOUNTER FOR GENERAL ADULT MEDICAL EXAMINATION W/OUT ABNORMAL FINDINGS: ICD-10-CM

## 2021-04-22 LAB
ALBUMIN SERPL ELPH-MCNC: 4.5 G/DL — SIGNIFICANT CHANGE UP (ref 3.3–5.2)
ALP SERPL-CCNC: 76 U/L — SIGNIFICANT CHANGE UP (ref 40–120)
ALT FLD-CCNC: 46 U/L — HIGH
ANION GAP SERPL CALC-SCNC: 10 MMOL/L — SIGNIFICANT CHANGE UP (ref 5–17)
APPEARANCE UR: CLEAR — SIGNIFICANT CHANGE UP
APTT BLD: 31.4 SEC — SIGNIFICANT CHANGE UP (ref 27.5–35.5)
AST SERPL-CCNC: 41 U/L — HIGH
BASOPHILS # BLD AUTO: 0.04 K/UL — SIGNIFICANT CHANGE UP (ref 0–0.2)
BASOPHILS NFR BLD AUTO: 0.5 % — SIGNIFICANT CHANGE UP (ref 0–2)
BILIRUB SERPL-MCNC: 0.5 MG/DL — SIGNIFICANT CHANGE UP (ref 0.4–2)
BILIRUB UR-MCNC: NEGATIVE — SIGNIFICANT CHANGE UP
BUN SERPL-MCNC: 17 MG/DL — SIGNIFICANT CHANGE UP (ref 8–20)
CALCIUM SERPL-MCNC: 9.4 MG/DL — SIGNIFICANT CHANGE UP (ref 8.6–10.2)
CHLORIDE SERPL-SCNC: 99 MMOL/L — SIGNIFICANT CHANGE UP (ref 98–107)
CO2 SERPL-SCNC: 30 MMOL/L — HIGH (ref 22–29)
COLOR SPEC: YELLOW — SIGNIFICANT CHANGE UP
CREAT SERPL-MCNC: 1.12 MG/DL — SIGNIFICANT CHANGE UP (ref 0.5–1.3)
DIFF PNL FLD: NEGATIVE — SIGNIFICANT CHANGE UP
EOSINOPHIL # BLD AUTO: 0.68 K/UL — HIGH (ref 0–0.5)
EOSINOPHIL NFR BLD AUTO: 8.1 % — HIGH (ref 0–6)
GLUCOSE SERPL-MCNC: 97 MG/DL — SIGNIFICANT CHANGE UP (ref 70–99)
GLUCOSE UR QL: NEGATIVE MG/DL — SIGNIFICANT CHANGE UP
HCT VFR BLD CALC: 50.7 % — HIGH (ref 39–50)
HGB BLD-MCNC: 17.2 G/DL — HIGH (ref 13–17)
IMM GRANULOCYTES NFR BLD AUTO: 0.4 % — SIGNIFICANT CHANGE UP (ref 0–1.5)
INR BLD: 1.19 RATIO — HIGH (ref 0.88–1.16)
KETONES UR-MCNC: NEGATIVE — SIGNIFICANT CHANGE UP
LACTATE BLDV-MCNC: 1.4 MMOL/L — SIGNIFICANT CHANGE UP (ref 0.5–2)
LEUKOCYTE ESTERASE UR-ACNC: NEGATIVE — SIGNIFICANT CHANGE UP
LYMPHOCYTES # BLD AUTO: 2.08 K/UL — SIGNIFICANT CHANGE UP (ref 1–3.3)
LYMPHOCYTES # BLD AUTO: 24.6 % — SIGNIFICANT CHANGE UP (ref 13–44)
MCHC RBC-ENTMCNC: 30.4 PG — SIGNIFICANT CHANGE UP (ref 27–34)
MCHC RBC-ENTMCNC: 33.9 GM/DL — SIGNIFICANT CHANGE UP (ref 32–36)
MCV RBC AUTO: 89.7 FL — SIGNIFICANT CHANGE UP (ref 80–100)
MONOCYTES # BLD AUTO: 0.91 K/UL — HIGH (ref 0–0.9)
MONOCYTES NFR BLD AUTO: 10.8 % — SIGNIFICANT CHANGE UP (ref 2–14)
NEUTROPHILS # BLD AUTO: 4.7 K/UL — SIGNIFICANT CHANGE UP (ref 1.8–7.4)
NEUTROPHILS NFR BLD AUTO: 55.6 % — SIGNIFICANT CHANGE UP (ref 43–77)
NITRITE UR-MCNC: NEGATIVE — SIGNIFICANT CHANGE UP
PH UR: 6.5 — SIGNIFICANT CHANGE UP (ref 5–8)
PLATELET # BLD AUTO: 256 K/UL — SIGNIFICANT CHANGE UP (ref 150–400)
POTASSIUM SERPL-MCNC: 4.1 MMOL/L — SIGNIFICANT CHANGE UP (ref 3.5–5.3)
POTASSIUM SERPL-SCNC: 4.1 MMOL/L — SIGNIFICANT CHANGE UP (ref 3.5–5.3)
PROT SERPL-MCNC: 7.5 G/DL — SIGNIFICANT CHANGE UP (ref 6.6–8.7)
PROT UR-MCNC: NEGATIVE MG/DL — SIGNIFICANT CHANGE UP
PROTHROM AB SERPL-ACNC: 13.7 SEC — HIGH (ref 10.6–13.6)
RBC # BLD: 5.65 M/UL — SIGNIFICANT CHANGE UP (ref 4.2–5.8)
RBC # FLD: 12.8 % — SIGNIFICANT CHANGE UP (ref 10.3–14.5)
SODIUM SERPL-SCNC: 139 MMOL/L — SIGNIFICANT CHANGE UP (ref 135–145)
SP GR SPEC: 1.01 — SIGNIFICANT CHANGE UP (ref 1.01–1.02)
UROBILINOGEN FLD QL: NEGATIVE MG/DL — SIGNIFICANT CHANGE UP
WBC # BLD: 8.44 K/UL — SIGNIFICANT CHANGE UP (ref 3.8–10.5)
WBC # FLD AUTO: 8.44 K/UL — SIGNIFICANT CHANGE UP (ref 3.8–10.5)

## 2021-04-22 PROCEDURE — 81003 URINALYSIS AUTO W/O SCOPE: CPT

## 2021-04-22 PROCEDURE — 99284 EMERGENCY DEPT VISIT MOD MDM: CPT

## 2021-04-22 PROCEDURE — 74177 CT ABD & PELVIS W/CONTRAST: CPT | Mod: 26,ME

## 2021-04-22 PROCEDURE — G1004: CPT

## 2021-04-22 PROCEDURE — 71045 X-RAY EXAM CHEST 1 VIEW: CPT | Mod: 26

## 2021-04-22 PROCEDURE — 74177 CT ABD & PELVIS W/CONTRAST: CPT

## 2021-04-22 PROCEDURE — 99072 ADDL SUPL MATRL&STAF TM PHE: CPT

## 2021-04-22 PROCEDURE — 71045 X-RAY EXAM CHEST 1 VIEW: CPT

## 2021-04-22 PROCEDURE — 87086 URINE CULTURE/COLONY COUNT: CPT

## 2021-04-22 PROCEDURE — 85730 THROMBOPLASTIN TIME PARTIAL: CPT

## 2021-04-22 PROCEDURE — 99284 EMERGENCY DEPT VISIT MOD MDM: CPT | Mod: 25

## 2021-04-22 PROCEDURE — 87040 BLOOD CULTURE FOR BACTERIA: CPT

## 2021-04-22 PROCEDURE — 85610 PROTHROMBIN TIME: CPT

## 2021-04-22 PROCEDURE — 36415 COLL VENOUS BLD VENIPUNCTURE: CPT

## 2021-04-22 PROCEDURE — 80053 COMPREHEN METABOLIC PANEL: CPT

## 2021-04-22 PROCEDURE — 93010 ELECTROCARDIOGRAM REPORT: CPT

## 2021-04-22 PROCEDURE — 99215 OFFICE O/P EST HI 40 MIN: CPT | Mod: 25

## 2021-04-22 PROCEDURE — 85025 COMPLETE CBC W/AUTO DIFF WBC: CPT

## 2021-04-22 PROCEDURE — 93005 ELECTROCARDIOGRAM TRACING: CPT

## 2021-04-22 PROCEDURE — 83605 ASSAY OF LACTIC ACID: CPT

## 2021-04-22 RX ORDER — IOHEXOL 300 MG/ML
30 INJECTION, SOLUTION INTRAVENOUS ONCE
Refills: 0 | Status: COMPLETED | OUTPATIENT
Start: 2021-04-22 | End: 2021-04-22

## 2021-04-22 RX ADMIN — IOHEXOL 30 MILLILITER(S): 300 INJECTION, SOLUTION INTRAVENOUS at 14:35

## 2021-04-22 NOTE — PLAN
[FreeTextEntry1] : 61yo MALE WITH HX OF HTN HLD  AND  DIVERTICULITIS  WITH LAST BOUT IN DEC 2019 AND WAS IN THE HOSPITAL WA SUPPOSED OT HAVE COLONOSCOPY BUT WITH COVID IT NEVER GOT DONE AND NOW WITH WHAT HE CALL A FLARE  AGAIN FOR A COUPLE OF WEEKS NO FEVERS NO CHILL NON TOXIC  HAD SEEN HIS UROLOGIST DR SELF WHO ORDERED ACT SCAN HASNT BEEN DONE YET WAS GIVEN  CIPRO FLAGYL BUT IS ONLY ON CIPRO\par HE ALSO STATES HE HAD SOME OTHER BOUTS RECENTLY \par I AM CONCERNED ABOUT ? APPENDIX \par WILL CHECK LABS INCLUDE AMYLASE LIPASE\par TOLD PT TO GO TO ER IF CANT GET CT TODAY\par SPOKE TO DAUGHTER \par ON PHONE

## 2021-04-22 NOTE — PHYSICAL EXAM
[Well Nourished] : well nourished [Well Developed] : well developed [Well-Appearing] : well-appearing [Normal Sclera/Conjunctiva] : normal sclera/conjunctiva [PERRL] : pupils equal round and reactive to light [EOMI] : extraocular movements intact [Normal Outer Ear/Nose] : the outer ears and nose were normal in appearance [Normal Oropharynx] : the oropharynx was normal [No JVD] : no jugular venous distention [No Lymphadenopathy] : no lymphadenopathy [Supple] : supple [Thyroid Normal, No Nodules] : the thyroid was normal and there were no nodules present [No Respiratory Distress] : no respiratory distress  [No Accessory Muscle Use] : no accessory muscle use [Clear to Auscultation] : lungs were clear to auscultation bilaterally [Normal Rate] : normal rate  [Regular Rhythm] : with a regular rhythm [Normal S1, S2] : normal S1 and S2 [No Murmur] : no murmur heard [No Carotid Bruits] : no carotid bruits [No Abdominal Bruit] : a ~M bruit was not heard ~T in the abdomen [No Varicosities] : no varicosities [Pedal Pulses Present] : the pedal pulses are present [No Edema] : there was no peripheral edema [No Palpable Aorta] : no palpable aorta [No Extremity Clubbing/Cyanosis] : no extremity clubbing/cyanosis [Soft] : abdomen soft [Non-distended] : non-distended [No Masses] : no abdominal mass palpated [No HSM] : no HSM [Normal Bowel Sounds] : normal bowel sounds [Normal Posterior Cervical Nodes] : no posterior cervical lymphadenopathy [Normal Anterior Cervical Nodes] : no anterior cervical lymphadenopathy [No CVA Tenderness] : no CVA  tenderness [No Spinal Tenderness] : no spinal tenderness [No Joint Swelling] : no joint swelling [Grossly Normal Strength/Tone] : grossly normal strength/tone [No Rash] : no rash [Coordination Grossly Intact] : coordination grossly intact [No Focal Deficits] : no focal deficits [Normal Gait] : normal gait [Normal Affect] : the affect was normal [Normal Insight/Judgement] : insight and judgment were intact [de-identified] : MILD TENDERNESS BOTH LEFT AND RIGHT LOWER  QUADRANS MORE ON RIGHT THAN LEFT  UMBILICAL HERNIA

## 2021-04-22 NOTE — ED PROVIDER NOTE - PHYSICAL EXAMINATION
Const: Awake, alert and oriented. In no acute distress. Well appearing.  HEENT: NC/AT. Moist mucous membranes.  Eyes: No scleral icterus. EOMI.  Neck:. Soft and supple. Full ROM without pain.  Cardiac: Regular rate and regular rhythm. +S1/S2. No murmurs. Peripheral pulses 2+ and symmetric. No LE edema.  Resp: Speaking in full sentences. No evidence of respiratory distress. No wheezes, rales or rhonchi.  Abd: Soft, moderately distended with soft, easily reducible umbilical hernia, mild diffuse TTP. Normal bowel sounds in all 4 quadrants. No guarding or rebound.  Back: Spine midline and non-tender. No CVAT.  Skin: No rashes, abrasions or lacerations.  Neuro: Awake, alert & oriented x 3. Moves all extremities symmetrically.

## 2021-04-22 NOTE — ED PROVIDER NOTE - PATIENT PORTAL LINK FT
You can access the FollowMyHealth Patient Portal offered by [facility] by registering at the following website: [email or web address] By joining [employer]’s FollowMyHealth portal, you will also be able to view your health information using other applications (apps) compatible with our system.

## 2021-04-22 NOTE — ED PROVIDER NOTE - NSFOLLOWUPINSTRUCTIONS_ED_ALL_ED_FT
Diverticulitis    WHAT YOU NEED TO KNOW:    Diverticulitis is a condition that causes small pockets along your intestine called diverticula to become inflamed or infected. This is caused by hard bowel movements, food, or bacteria that get stuck in the pockets.    DISCHARGE INSTRUCTIONS:    Return to the emergency department if:   •You have bowel movement or foul-smelling discharge leaking from your vagina or in your urine.      •You have severe diarrhea.      •You urinate less than usual or not at all.      •You are not able to have a bowel movement.      •You cannot stop vomiting.       •You have severe abdominal pain, a fever, and your abdomen is larger than usual.       •You have new or increased blood in your bowel movements.       Contact your healthcare provider if:   •You have pain when you urinate.      •Your symptoms get worse or do not go away.       •You have questions or concerns about your condition or care.       Medicines:   •Antibiotics may be given to help treat a bacterial infection.      •Prescription pain medicine may be given. Ask your healthcare provider how to take this medicine safely. Some prescription pain medicines contain acetaminophen. Do not take other medicines that contain acetaminophen without talking to your healthcare provider. Too much acetaminophen may cause liver damage. Prescription pain medicine may cause constipation. Ask your healthcare provider how to prevent or treat constipation.       •Take your medicine as directed. Contact your healthcare provider if you think your medicine is not helping or if you have side effects. Tell him or her if you are allergic to any medicine. Keep a list of the medicines, vitamins, and herbs you take. Include the amounts, and when and why you take them. Bring the list or the pill bottles to follow-up visits. Carry your medicine list with you in case of an emergency.      Clear liquid diet: A clear liquid diet includes any liquids that you can see through. Examples include water, ginger-vin, cranberry or apple juice, frozen fruit ice, or broth. Stay on a clear liquid diet until your symptoms are gone, or as directed.     Follow up with your healthcare provider as directed: You may need to return for a colonoscopy. When your symptoms are gone, you may need a low-fat, high-fiber diet to prevent diverticulitis from developing again. Your healthcare provider or dietitian can help you create meal plans. Write down your questions so you remember to ask them during your visits.

## 2021-04-22 NOTE — ED PROVIDER NOTE - OBJECTIVE STATEMENT
61 y/o M with HTN, HLD, diverticulitis presents complaining of 2 weeks of abdominal pain, on Cipro/Flagyl from Dr. Allen, decreased appetite. The abdominal pain is intermittent, sharp at times, taking 800 mg of ibuprofen PRN, but not around the clock. He denies fevers, nausea, vomiting, diarrhea, constipation, blood in stool. He states his pain is mostly on the right side, he has had a diverticular perforation in the past. He does not take blood thinners. He was sent in by Dr. Bedoya (PMD) for concern for diverticulitis again. He also notes abdominal distension associated with the discomfort. He denies allergies to medications, denies smoking EtOh or illicit drugs.

## 2021-04-22 NOTE — ED PROVIDER NOTE - NS ED ROS FT
Const: Denies fever, chills  HEENT: Denies blurry vision, sore throat  Neck: Denies neck pain/stiffness  Resp: Denies coughing, SOB  Cardiovascular: Denies CP, palpitations, LE edema  GI: + abdominal pain, + abdominal distension. Denies nausea, vomiting, diarrhea, constipation, blood in stool  : Denies urinary frequency/urgency/dysuria, hematuria  MSK: Denies back pain  Neuro: Denies HA, dizziness, numbness, weakness  Skin: Denies rashes.

## 2021-04-22 NOTE — ED PROVIDER NOTE - CLINICAL SUMMARY MEDICAL DECISION MAKING FREE TEXT BOX
61 y/o M presents concern for diverticulitis - has history of perforation. Non-toxic appearing, abdomen distended, mildly diffusely tender, afebrile. Already on Cipro/Flagyl, will evaluate for abscess or perforation with CT.

## 2021-04-22 NOTE — HISTORY OF PRESENT ILLNESS
[FreeTextEntry1] : abd pain and fatigue [de-identified] : 61yo MALE WITH HX OF HTN HLD  AND  DIVERTICULITIS  WITH LAST BOUT IN DEC 2019 AND WAS IN THE HOSPITAL WA SUPPOSED OT HAVE COLONOSCOPY BUT WITH COVID IT NEVER GOT DONE AND NOW WITH WHAT HE CALL A FLARE  AGAIN FOR A COUPLE OF WEEKS NO FEVERS NO CHILL NON TOXIC  HAD SEEN HIS UROLOGIST DR SELF WHO ORDERED ACT SCAN HASNT BEEN DONE YET WAS GIVEN  CIPRO FLAGYL BUT IS ONLY ON CIPRO\par HE ALSO STATES HE HAD SOME OTHER BOUTS RECENTLY

## 2021-04-23 LAB
CULTURE RESULTS: NO GROWTH — SIGNIFICANT CHANGE UP
SPECIMEN SOURCE: SIGNIFICANT CHANGE UP

## 2021-04-25 LAB
ALBUMIN SERPL ELPH-MCNC: 4.7 G/DL
ALP BLD-CCNC: 77 U/L
ALT SERPL-CCNC: 46 U/L
AMYLASE/CREAT SERPL: 63 U/L
ANION GAP SERPL CALC-SCNC: 12 MMOL/L
AST SERPL-CCNC: 34 U/L
BASOPHILS # BLD AUTO: 0.05 K/UL
BASOPHILS NFR BLD AUTO: 0.6 %
BILIRUB SERPL-MCNC: 0.6 MG/DL
BUN SERPL-MCNC: 18 MG/DL
CALCIUM SERPL-MCNC: 9.9 MG/DL
CHLORIDE SERPL-SCNC: 100 MMOL/L
CHOLEST SERPL-MCNC: 153 MG/DL
CO2 SERPL-SCNC: 29 MMOL/L
CREAT SERPL-MCNC: 1.17 MG/DL
EOSINOPHIL # BLD AUTO: 0.58 K/UL
EOSINOPHIL NFR BLD AUTO: 7.5 %
GLUCOSE SERPL-MCNC: 149 MG/DL
HCT VFR BLD CALC: 52.3 %
HDLC SERPL-MCNC: 29 MG/DL
HGB BLD-MCNC: 16.8 G/DL
IMM GRANULOCYTES NFR BLD AUTO: 0.4 %
LDLC SERPL CALC-MCNC: 63 MG/DL
LPL SERPL-CCNC: 31 U/L
LYMPHOCYTES # BLD AUTO: 2.01 K/UL
LYMPHOCYTES NFR BLD AUTO: 25.9 %
MAN DIFF?: NORMAL
MCHC RBC-ENTMCNC: 30.2 PG
MCHC RBC-ENTMCNC: 32.1 GM/DL
MCV RBC AUTO: 93.9 FL
MONOCYTES # BLD AUTO: 0.72 K/UL
MONOCYTES NFR BLD AUTO: 9.3 %
NEUTROPHILS # BLD AUTO: 4.37 K/UL
NEUTROPHILS NFR BLD AUTO: 56.3 %
NONHDLC SERPL-MCNC: 123 MG/DL
PLATELET # BLD AUTO: 250 K/UL
POTASSIUM SERPL-SCNC: 4.4 MMOL/L
PROT SERPL-MCNC: 7.3 G/DL
RBC # BLD: 5.57 M/UL
RBC # FLD: 13.7 %
SODIUM SERPL-SCNC: 142 MMOL/L
TRIGL SERPL-MCNC: 300 MG/DL
WBC # FLD AUTO: 7.76 K/UL

## 2021-04-27 LAB
CULTURE RESULTS: SIGNIFICANT CHANGE UP
CULTURE RESULTS: SIGNIFICANT CHANGE UP
SPECIMEN SOURCE: SIGNIFICANT CHANGE UP
SPECIMEN SOURCE: SIGNIFICANT CHANGE UP

## 2021-06-02 DIAGNOSIS — G47.9 SLEEP DISORDER, UNSPECIFIED: ICD-10-CM

## 2021-06-02 DIAGNOSIS — F51.4 SLEEP TERRORS [NIGHT TERRORS]: ICD-10-CM

## 2021-06-28 ENCOUNTER — RX CHANGE (OUTPATIENT)
Age: 61
End: 2021-06-28

## 2021-07-07 ENCOUNTER — APPOINTMENT (OUTPATIENT)
Dept: GASTROENTEROLOGY | Facility: CLINIC | Age: 61
End: 2021-07-07
Payer: COMMERCIAL

## 2021-07-07 VITALS
HEART RATE: 97 BPM | DIASTOLIC BLOOD PRESSURE: 91 MMHG | BODY MASS INDEX: 28.88 KG/M2 | OXYGEN SATURATION: 98 % | WEIGHT: 184 LBS | RESPIRATION RATE: 14 BRPM | TEMPERATURE: 97.3 F | SYSTOLIC BLOOD PRESSURE: 138 MMHG | HEIGHT: 67 IN

## 2021-07-07 DIAGNOSIS — K57.92 DIVERTICULITIS OF INTESTINE, PART UNSPECIFIED, W/OUT PERFORATION OR ABSCESS W/OUT BLEEDING: ICD-10-CM

## 2021-07-07 PROCEDURE — 99072 ADDL SUPL MATRL&STAF TM PHE: CPT

## 2021-07-07 PROCEDURE — 99213 OFFICE O/P EST LOW 20 MIN: CPT

## 2021-07-07 NOTE — ASSESSMENT
[FreeTextEntry1] : The patient has recurrent diverticulitis.  The patient is passing bowel movement regularly.  As there is some associated colitis along with diverticulitis is possible.  I am going to obtain a CT abdomen and pelvis.  We will also obtain labs.  If there is no evidence of any diverticulitis, I will treat him with mesalamine and hyoscyamine.  If he has superimposed diverticulitis, antibiotics may be needed.  The patient may need a colonoscopy and if he has continue his recurrent symptoms, may need a segmental resection.\par \par Cuco Aguilar MD\par Gastroenterology \par \par

## 2021-07-07 NOTE — HISTORY OF PRESENT ILLNESS
[de-identified] : The patient arrived for a follow up. He has history of diverticulitis. He was evaluated in the past and was recommended to have a colonoscopy.  Last CT abdomen April 2021 showed sigmoid diverticulitis.  He has been complaining of lot of abdominal pain especially in the left lower quadrant.  He is passing bowel movement regularly. [FreeTextEntry1] : EXAM: CT ABDOMEN AND PELVIS OC IC  PROCEDURE DATE: 04/22/2021    INTERPRETATION: CLINICAL INFORMATION: Right-sided abdominal pain for 2 weeks, on antibiotics.  COMPARISON: Stone cast CT dated October 31, 2020.  CONTRAST/COMPLICATIONS: IV Contrast: Omnipaque 300 91 cc administered 9 cc discarded Oral Contrast: NONE Complications: None reported at time of study completion  PROCEDURE: CT of the Abdomen and Pelvis was performed. Sagittal and coronal reformats were performed.  FINDINGS: LOWER CHEST: Within normal limits.  LIVER: Mild diffuse decreased attenuation of the liver, compatible with mild steatosis. Otherwise, the liver is unremarkable in appearance and enhancement. Hepatic veins and portal vein are patent.. BILE DUCTS: Normal caliber. GALLBLADDER: Within normal limits. SPLEEN: Within normal limits. PANCREAS: Within normal limits. ADRENALS: Within normal limits. KIDNEYS/URETERS: Within normal limits.  BLADDER: Within normal limits. REPRODUCTIVE ORGANS: The prostate is not enlarged.  BOWEL: Sigmoid diverticulosis. There is a questionably mildly inflamed diverticulum of the mid sigmoid colon on image 106. No and evidence of extraluminal gas or drainable fluid collection. Otherwise, small and large bowel loops are unremarkable with no evidence of obstruction, bowel wall thickening, or inflammatory stranding of the adjacent mesenteric fat. The appendix is unremarkable. PERITONEUM: No ascites. VESSELS: Within normal limits. RETROPERITONEUM/LYMPH NODES: No lymphadenopathy. ABDOMINAL WALL: Small fat-containing umbilical hernia. BONES: Again is noted expansion of the medullary space of the left iliac wing without overlying cortical thickening, which may represent fibrous dysplasia.  IMPRESSION: Question trace diverticulitis of the mid sigmoid colon.

## 2021-07-09 LAB
ALBUMIN SERPL ELPH-MCNC: 4.8 G/DL
ALP BLD-CCNC: 74 U/L
ALT SERPL-CCNC: 48 U/L
ANION GAP SERPL CALC-SCNC: 11 MMOL/L
AST SERPL-CCNC: 32 U/L
BASOPHILS # BLD AUTO: 0.05 K/UL
BASOPHILS NFR BLD AUTO: 0.7 %
BILIRUB SERPL-MCNC: 0.7 MG/DL
BUN SERPL-MCNC: 24 MG/DL
CALCIUM SERPL-MCNC: 10.3 MG/DL
CHLORIDE SERPL-SCNC: 102 MMOL/L
CO2 SERPL-SCNC: 28 MMOL/L
CREAT SERPL-MCNC: 1.18 MG/DL
EOSINOPHIL # BLD AUTO: 0.41 K/UL
EOSINOPHIL NFR BLD AUTO: 5.4 %
GLUCOSE SERPL-MCNC: 124 MG/DL
HCT VFR BLD CALC: 50.7 %
HGB BLD-MCNC: 16.8 G/DL
IMM GRANULOCYTES NFR BLD AUTO: 0.4 %
LYMPHOCYTES # BLD AUTO: 1.85 K/UL
LYMPHOCYTES NFR BLD AUTO: 24.2 %
MAN DIFF?: NORMAL
MCHC RBC-ENTMCNC: 29.9 PG
MCHC RBC-ENTMCNC: 33.1 GM/DL
MCV RBC AUTO: 90.4 FL
MONOCYTES # BLD AUTO: 0.63 K/UL
MONOCYTES NFR BLD AUTO: 8.2 %
NEUTROPHILS # BLD AUTO: 4.69 K/UL
NEUTROPHILS NFR BLD AUTO: 61.1 %
PLATELET # BLD AUTO: 245 K/UL
POTASSIUM SERPL-SCNC: 4.2 MMOL/L
PROT SERPL-MCNC: 7.1 G/DL
RBC # BLD: 5.61 M/UL
RBC # FLD: 13.1 %
SODIUM SERPL-SCNC: 141 MMOL/L
WBC # FLD AUTO: 7.66 K/UL

## 2021-07-12 ENCOUNTER — RESULT REVIEW (OUTPATIENT)
Age: 61
End: 2021-07-12

## 2021-07-12 ENCOUNTER — APPOINTMENT (OUTPATIENT)
Dept: DERMATOLOGY | Facility: CLINIC | Age: 61
End: 2021-07-12
Payer: COMMERCIAL

## 2021-07-12 PROCEDURE — 11102 TANGNTL BX SKIN SINGLE LES: CPT | Mod: 59

## 2021-07-12 PROCEDURE — 99072 ADDL SUPL MATRL&STAF TM PHE: CPT

## 2021-07-12 PROCEDURE — 17110 DESTRUCTION B9 LES UP TO 14: CPT

## 2021-07-12 PROCEDURE — 99202 OFFICE O/P NEW SF 15 MIN: CPT | Mod: 25

## 2021-08-03 ENCOUNTER — OUTPATIENT (OUTPATIENT)
Dept: OUTPATIENT SERVICES | Facility: HOSPITAL | Age: 61
LOS: 1 days | End: 2021-08-03

## 2021-08-03 ENCOUNTER — APPOINTMENT (OUTPATIENT)
Dept: CT IMAGING | Facility: CLINIC | Age: 61
End: 2021-08-03
Payer: COMMERCIAL

## 2021-08-03 DIAGNOSIS — K57.92 DIVERTICULITIS OF INTESTINE, PART UNSPECIFIED, WITHOUT PERFORATION OR ABSCESS WITHOUT BLEEDING: ICD-10-CM

## 2021-08-03 PROCEDURE — 74177 CT ABD & PELVIS W/CONTRAST: CPT | Mod: 26

## 2021-09-04 ENCOUNTER — RX RENEWAL (OUTPATIENT)
Age: 61
End: 2021-09-04

## 2021-09-08 ENCOUNTER — RX RENEWAL (OUTPATIENT)
Age: 61
End: 2021-09-08

## 2021-09-14 ENCOUNTER — APPOINTMENT (OUTPATIENT)
Dept: DISASTER EMERGENCY | Facility: CLINIC | Age: 61
End: 2021-09-14

## 2021-09-17 ENCOUNTER — APPOINTMENT (OUTPATIENT)
Dept: GASTROENTEROLOGY | Facility: GI CENTER | Age: 61
End: 2021-09-17

## 2021-10-05 ENCOUNTER — NON-APPOINTMENT (OUTPATIENT)
Age: 61
End: 2021-10-05

## 2021-11-16 ENCOUNTER — RX RENEWAL (OUTPATIENT)
Age: 61
End: 2021-11-16

## 2021-11-17 ENCOUNTER — APPOINTMENT (OUTPATIENT)
Dept: INTERNAL MEDICINE | Facility: CLINIC | Age: 61
End: 2021-11-17
Payer: COMMERCIAL

## 2021-11-17 VITALS
BODY MASS INDEX: 28.72 KG/M2 | TEMPERATURE: 97.8 F | HEIGHT: 67 IN | SYSTOLIC BLOOD PRESSURE: 140 MMHG | DIASTOLIC BLOOD PRESSURE: 75 MMHG | WEIGHT: 183 LBS

## 2021-11-17 DIAGNOSIS — K52.9 NONINFECTIVE GASTROENTERITIS AND COLITIS, UNSPECIFIED: ICD-10-CM

## 2021-11-17 DIAGNOSIS — Z87.898 PERSONAL HISTORY OF OTHER SPECIFIED CONDITIONS: ICD-10-CM

## 2021-11-17 DIAGNOSIS — R10.9 UNSPECIFIED ABDOMINAL PAIN: ICD-10-CM

## 2021-11-17 PROCEDURE — 99215 OFFICE O/P EST HI 40 MIN: CPT | Mod: 25

## 2021-11-17 PROCEDURE — 36415 COLL VENOUS BLD VENIPUNCTURE: CPT

## 2021-11-17 RX ORDER — SODIUM SULFATE, POTASSIUM SULFATE, MAGNESIUM SULFATE 17.5; 3.13; 1.6 G/ML; G/ML; G/ML
17.5-3.13-1.6 SOLUTION, CONCENTRATE ORAL
Qty: 1 | Refills: 0 | Status: DISCONTINUED | COMMUNITY
Start: 2020-10-06 | End: 2021-11-17

## 2021-11-17 RX ORDER — FLUOXETINE HYDROCHLORIDE 20 MG/1
20 CAPSULE ORAL
Qty: 90 | Refills: 0 | Status: DISCONTINUED | COMMUNITY
Start: 2021-02-15 | End: 2021-11-17

## 2021-11-17 RX ORDER — TAMSULOSIN HYDROCHLORIDE 0.4 MG/1
0.4 CAPSULE ORAL
Qty: 30 | Refills: 0 | Status: DISCONTINUED | COMMUNITY
Start: 2021-04-16 | End: 2021-11-17

## 2021-11-17 RX ORDER — METRONIDAZOLE 500 MG/1
500 TABLET ORAL
Qty: 42 | Refills: 0 | Status: DISCONTINUED | COMMUNITY
Start: 2021-04-16 | End: 2021-11-17

## 2021-11-17 RX ORDER — CIPROFLOXACIN HYDROCHLORIDE 500 MG/1
500 TABLET, FILM COATED ORAL
Qty: 28 | Refills: 0 | Status: DISCONTINUED | COMMUNITY
Start: 2021-04-16 | End: 2021-11-17

## 2021-11-17 RX ORDER — HYOSCYAMINE SULFATE 0.38 MG/1
0.38 TABLET, EXTENDED RELEASE ORAL
Qty: 60 | Refills: 2 | Status: DISCONTINUED | COMMUNITY
Start: 2021-07-07 | End: 2021-11-17

## 2021-11-17 RX ORDER — OXYBUTYNIN CHLORIDE 15 MG/1
15 TABLET, EXTENDED RELEASE ORAL
Qty: 30 | Refills: 0 | Status: DISCONTINUED | COMMUNITY
Start: 2021-04-16 | End: 2021-11-17

## 2021-11-17 NOTE — PHYSICAL EXAM
[Well Nourished] : well nourished [Well Developed] : well developed [Well-Appearing] : well-appearing [Normal Sclera/Conjunctiva] : normal sclera/conjunctiva [PERRL] : pupils equal round and reactive to light [EOMI] : extraocular movements intact [Normal Outer Ear/Nose] : the outer ears and nose were normal in appearance [Normal Oropharynx] : the oropharynx was normal [No JVD] : no jugular venous distention [No Lymphadenopathy] : no lymphadenopathy [Supple] : supple [Thyroid Normal, No Nodules] : the thyroid was normal and there were no nodules present [No Respiratory Distress] : no respiratory distress  [No Accessory Muscle Use] : no accessory muscle use [Clear to Auscultation] : lungs were clear to auscultation bilaterally [Normal Rate] : normal rate  [Regular Rhythm] : with a regular rhythm [Normal S1, S2] : normal S1 and S2 [No Murmur] : no murmur heard [No Carotid Bruits] : no carotid bruits [No Abdominal Bruit] : a ~M bruit was not heard ~T in the abdomen [No Varicosities] : no varicosities [Pedal Pulses Present] : the pedal pulses are present [No Edema] : there was no peripheral edema [No Palpable Aorta] : no palpable aorta [No Extremity Clubbing/Cyanosis] : no extremity clubbing/cyanosis [Soft] : abdomen soft [Non-distended] : non-distended [No Masses] : no abdominal mass palpated [No HSM] : no HSM [Normal Bowel Sounds] : normal bowel sounds [Normal Posterior Cervical Nodes] : no posterior cervical lymphadenopathy [Normal Anterior Cervical Nodes] : no anterior cervical lymphadenopathy [No CVA Tenderness] : no CVA  tenderness [No Spinal Tenderness] : no spinal tenderness [No Joint Swelling] : no joint swelling [Grossly Normal Strength/Tone] : grossly normal strength/tone [No Rash] : no rash [Coordination Grossly Intact] : coordination grossly intact [No Focal Deficits] : no focal deficits [Normal Gait] : normal gait [Normal Affect] : the affect was normal [Normal Insight/Judgement] : insight and judgment were intact [de-identified] : MILD TENDERNESS BOTH LEFT AND RIGHT LOWER  QUADRANS MORE ON RIGHT THAN LEFT  UMBILICAL HERNIA

## 2021-11-17 NOTE — HISTORY OF PRESENT ILLNESS
[FreeTextEntry1] : ABD PAIN AND DIARRHEA  [de-identified] : 61yo MALE WITH HX OF HTN HLD  AND  DIVERTICULITIS  WITH LAST BOUT IN DEC 2019 AND WAS IN THE HOSPITAL WA SUPPOSED OT HAVE COLONOSCOPY BUT WITH COVID IT NEVER GOT DONE AND NOW WITH CONTINUED BOUTS HAS SEEN GI\par DR RODNEY HAS SOME EPISODES OF BLOOD Y DIARRHEA IS AFRAID TO EAT AND ALSO AFRAID TO GET  COLONOSCOPY DID FEEL BETTER  ON MESALAMINE  BUT STILL  HAS NOT HA D A COLONOSCOPY  \par

## 2021-11-17 NOTE — PLAN
[FreeTextEntry1] : 59yo MALE WITH HX OF HTN HLD  AND  DIVERTICULITIS  WITH LAST BOUT IN DEC 2019 AND WAS IN THE HOSPITAL WA SUPPOSED OT HAVE COLONOSCOPY BUT WITH COVID IT NEVER GOT DONE AND NOW WITH CONTINUED BOUTS HAS SEEN GI\par DR RODNEY HAS SOME EPISODES OF BLOOD Y DIARRHEA IS AFRAID TO EAT AND ALSO AFRAID TO GET  COLONOSCOPY DID FEEL BETTER  ON MESALAMINE  BUT STILL  HAS NOT HA D A COLONOSCOPY  \par  ENCOURAGED D PT TO GET COLONOSCOPY WOULD GO BACK ON MESALAMINE PT MAY HAVE ULCERATIVE COLITIS \par PT ON HIGH DOSE OF TRAZADONE  HAS BEEN ON IT FORM PREVIOUS DOCOTR AND NEEDS  TO TRYTO BEGIN TO TAPER \par WILL CHECK LABS \par WILL FOLLOW UP

## 2021-11-26 LAB
25(OH)D3 SERPL-MCNC: 38 NG/ML
ALBUMIN SERPL ELPH-MCNC: 4.8 G/DL
ALP BLD-CCNC: 80 U/L
ALT SERPL-CCNC: 48 U/L
ANION GAP SERPL CALC-SCNC: 18 MMOL/L
AST SERPL-CCNC: 37 U/L
BASOPHILS # BLD AUTO: 0.05 K/UL
BASOPHILS NFR BLD AUTO: 0.6 %
BILIRUB SERPL-MCNC: 0.6 MG/DL
BUN SERPL-MCNC: 23 MG/DL
CALCIUM SERPL-MCNC: 9.8 MG/DL
CHLORIDE SERPL-SCNC: 100 MMOL/L
CHOLEST SERPL-MCNC: 166 MG/DL
CO2 SERPL-SCNC: 25 MMOL/L
CREAT SERPL-MCNC: 1.21 MG/DL
EOSINOPHIL # BLD AUTO: 0.41 K/UL
EOSINOPHIL NFR BLD AUTO: 4.9 %
ERYTHROCYTE [SEDIMENTATION RATE] IN BLOOD BY WESTERGREN METHOD: 13 MM/HR
ESTIMATED AVERAGE GLUCOSE: 114 MG/DL
GLUCOSE SERPL-MCNC: 54 MG/DL
HBA1C MFR BLD HPLC: 5.6 %
HCT VFR BLD CALC: 51.6 %
HDLC SERPL-MCNC: 31 MG/DL
HGB BLD-MCNC: 17 G/DL
IMM GRANULOCYTES NFR BLD AUTO: 0.5 %
LDLC SERPL CALC-MCNC: 92 MG/DL
LYMPHOCYTES # BLD AUTO: 2.48 K/UL
LYMPHOCYTES NFR BLD AUTO: 29.5 %
MAN DIFF?: NORMAL
MCHC RBC-ENTMCNC: 30.1 PG
MCHC RBC-ENTMCNC: 32.9 GM/DL
MCV RBC AUTO: 91.3 FL
MONOCYTES # BLD AUTO: 1.02 K/UL
MONOCYTES NFR BLD AUTO: 12.1 %
NEUTROPHILS # BLD AUTO: 4.42 K/UL
NEUTROPHILS NFR BLD AUTO: 52.4 %
NONHDLC SERPL-MCNC: 135 MG/DL
PLATELET # BLD AUTO: 268 K/UL
POTASSIUM SERPL-SCNC: 4.6 MMOL/L
PROT SERPL-MCNC: 7.4 G/DL
RBC # BLD: 5.65 M/UL
RBC # FLD: 13.2 %
SODIUM SERPL-SCNC: 144 MMOL/L
TRIGL SERPL-MCNC: 219 MG/DL
TSH SERPL-ACNC: 5.06 UIU/ML
WBC # FLD AUTO: 8.42 K/UL

## 2021-11-28 ENCOUNTER — RX RENEWAL (OUTPATIENT)
Age: 61
End: 2021-11-28

## 2021-12-21 NOTE — AIRWAY REMOVAL NOTE  ADULT & PEDS - NS REMOVAL  OF ARTIFICAL AIRWAY STRIDOR
Patient requesting to eat before taking motrin. Patient instructed to eat at this time so medication can be given. NP made aware. Will continue to monitor.
no

## 2022-02-09 ENCOUNTER — INPATIENT (INPATIENT)
Facility: HOSPITAL | Age: 62
LOS: 0 days | Discharge: HOME | DRG: 247 | End: 2022-02-10
Attending: INTERNAL MEDICINE | Admitting: INTERNAL MEDICINE
Payer: COMMERCIAL

## 2022-02-09 ENCOUNTER — TRANSCRIPTION ENCOUNTER (OUTPATIENT)
Age: 62
End: 2022-02-09

## 2022-02-09 VITALS
DIASTOLIC BLOOD PRESSURE: 87 MMHG | HEIGHT: 67 IN | TEMPERATURE: 98 F | OXYGEN SATURATION: 99 % | HEART RATE: 70 BPM | RESPIRATION RATE: 16 BRPM | WEIGHT: 182.1 LBS | SYSTOLIC BLOOD PRESSURE: 166 MMHG

## 2022-02-09 DIAGNOSIS — R07.9 CHEST PAIN, UNSPECIFIED: ICD-10-CM

## 2022-02-09 LAB
A1C WITH ESTIMATED AVERAGE GLUCOSE RESULT: 5.8 % — HIGH (ref 4–5.6)
ALBUMIN SERPL ELPH-MCNC: 4.5 G/DL — SIGNIFICANT CHANGE UP (ref 3.3–5.2)
ALP SERPL-CCNC: 78 U/L — SIGNIFICANT CHANGE UP (ref 40–120)
ALT FLD-CCNC: 42 U/L — HIGH
ANION GAP SERPL CALC-SCNC: 12 MMOL/L — SIGNIFICANT CHANGE UP (ref 5–17)
AST SERPL-CCNC: 34 U/L — SIGNIFICANT CHANGE UP
BASOPHILS # BLD AUTO: 0.04 K/UL — SIGNIFICANT CHANGE UP (ref 0–0.2)
BASOPHILS NFR BLD AUTO: 0.5 % — SIGNIFICANT CHANGE UP (ref 0–2)
BILIRUB SERPL-MCNC: 0.8 MG/DL — SIGNIFICANT CHANGE UP (ref 0.4–2)
BLD GP AB SCN SERPL QL: SIGNIFICANT CHANGE UP
BUN SERPL-MCNC: 26.8 MG/DL — HIGH (ref 8–20)
CALCIUM SERPL-MCNC: 9.4 MG/DL — SIGNIFICANT CHANGE UP (ref 8.6–10.2)
CHLORIDE SERPL-SCNC: 101 MMOL/L — SIGNIFICANT CHANGE UP (ref 98–107)
CHOLEST SERPL-MCNC: 184 MG/DL — SIGNIFICANT CHANGE UP
CO2 SERPL-SCNC: 26 MMOL/L — SIGNIFICANT CHANGE UP (ref 22–29)
CREAT SERPL-MCNC: 1.07 MG/DL — SIGNIFICANT CHANGE UP (ref 0.5–1.3)
EOSINOPHIL # BLD AUTO: 0.42 K/UL — SIGNIFICANT CHANGE UP (ref 0–0.5)
EOSINOPHIL NFR BLD AUTO: 4.8 % — SIGNIFICANT CHANGE UP (ref 0–6)
ESTIMATED AVERAGE GLUCOSE: 120 MG/DL — HIGH (ref 68–114)
GLUCOSE SERPL-MCNC: 101 MG/DL — HIGH (ref 70–99)
HCT VFR BLD CALC: 51 % — HIGH (ref 39–50)
HDLC SERPL-MCNC: 35 MG/DL — LOW
HGB BLD-MCNC: 17.2 G/DL — HIGH (ref 13–17)
IMM GRANULOCYTES NFR BLD AUTO: 0.5 % — SIGNIFICANT CHANGE UP (ref 0–1.5)
LIPID PNL WITH DIRECT LDL SERPL: 112 MG/DL — HIGH
LYMPHOCYTES # BLD AUTO: 2.23 K/UL — SIGNIFICANT CHANGE UP (ref 1–3.3)
LYMPHOCYTES # BLD AUTO: 25.4 % — SIGNIFICANT CHANGE UP (ref 13–44)
MAGNESIUM SERPL-MCNC: 1.9 MG/DL — SIGNIFICANT CHANGE UP (ref 1.8–2.6)
MCHC RBC-ENTMCNC: 29.8 PG — SIGNIFICANT CHANGE UP (ref 27–34)
MCHC RBC-ENTMCNC: 33.7 GM/DL — SIGNIFICANT CHANGE UP (ref 32–36)
MCV RBC AUTO: 88.4 FL — SIGNIFICANT CHANGE UP (ref 80–100)
MONOCYTES # BLD AUTO: 0.83 K/UL — SIGNIFICANT CHANGE UP (ref 0–0.9)
MONOCYTES NFR BLD AUTO: 9.5 % — SIGNIFICANT CHANGE UP (ref 2–14)
NEUTROPHILS # BLD AUTO: 5.21 K/UL — SIGNIFICANT CHANGE UP (ref 1.8–7.4)
NEUTROPHILS NFR BLD AUTO: 59.3 % — SIGNIFICANT CHANGE UP (ref 43–77)
NON HDL CHOLESTEROL: 149 MG/DL — HIGH
PLATELET # BLD AUTO: 277 K/UL — SIGNIFICANT CHANGE UP (ref 150–400)
POTASSIUM SERPL-MCNC: 4 MMOL/L — SIGNIFICANT CHANGE UP (ref 3.5–5.3)
POTASSIUM SERPL-SCNC: 4 MMOL/L — SIGNIFICANT CHANGE UP (ref 3.5–5.3)
PROT SERPL-MCNC: 7.4 G/DL — SIGNIFICANT CHANGE UP (ref 6.6–8.7)
RBC # BLD: 5.77 M/UL — SIGNIFICANT CHANGE UP (ref 4.2–5.8)
RBC # FLD: 13.1 % — SIGNIFICANT CHANGE UP (ref 10.3–14.5)
SODIUM SERPL-SCNC: 139 MMOL/L — SIGNIFICANT CHANGE UP (ref 135–145)
TRIGL SERPL-MCNC: 183 MG/DL — HIGH
WBC # BLD: 8.77 K/UL — SIGNIFICANT CHANGE UP (ref 3.8–10.5)
WBC # FLD AUTO: 8.77 K/UL — SIGNIFICANT CHANGE UP (ref 3.8–10.5)

## 2022-02-09 PROCEDURE — 93010 ELECTROCARDIOGRAM REPORT: CPT

## 2022-02-09 RX ORDER — ASPIRIN/CALCIUM CARB/MAGNESIUM 324 MG
81 TABLET ORAL ONCE
Refills: 0 | Status: COMPLETED | OUTPATIENT
Start: 2022-02-09 | End: 2022-02-09

## 2022-02-09 RX ORDER — ASPIRIN/CALCIUM CARB/MAGNESIUM 324 MG
81 TABLET ORAL DAILY
Refills: 0 | Status: DISCONTINUED | OUTPATIENT
Start: 2022-02-10 | End: 2022-02-10

## 2022-02-09 RX ORDER — VALSARTAN 80 MG/1
160 TABLET ORAL DAILY
Refills: 0 | Status: DISCONTINUED | OUTPATIENT
Start: 2022-02-09 | End: 2022-02-10

## 2022-02-09 RX ORDER — CHLORHEXIDINE GLUCONATE 213 G/1000ML
1 SOLUTION TOPICAL ONCE
Refills: 0 | Status: DISCONTINUED | OUTPATIENT
Start: 2022-02-09 | End: 2022-02-10

## 2022-02-09 RX ORDER — SIMVASTATIN 20 MG/1
40 TABLET, FILM COATED ORAL AT BEDTIME
Refills: 0 | Status: DISCONTINUED | OUTPATIENT
Start: 2022-02-09 | End: 2022-02-10

## 2022-02-09 RX ORDER — FLUOXETINE HCL 10 MG
1 CAPSULE ORAL
Qty: 0 | Refills: 0 | DISCHARGE

## 2022-02-09 RX ORDER — MESALAMINE 400 MG
800 TABLET, DELAYED RELEASE (ENTERIC COATED) ORAL DAILY
Refills: 0 | Status: DISCONTINUED | OUTPATIENT
Start: 2022-02-09 | End: 2022-02-10

## 2022-02-09 RX ORDER — CLOPIDOGREL BISULFATE 75 MG/1
75 TABLET, FILM COATED ORAL DAILY
Refills: 0 | Status: DISCONTINUED | OUTPATIENT
Start: 2022-02-10 | End: 2022-02-10

## 2022-02-09 RX ORDER — GABAPENTIN 400 MG/1
300 CAPSULE ORAL
Refills: 0 | Status: DISCONTINUED | OUTPATIENT
Start: 2022-02-09 | End: 2022-02-10

## 2022-02-09 RX ORDER — TRAZODONE HCL 50 MG
100 TABLET ORAL AT BEDTIME
Refills: 0 | Status: DISCONTINUED | OUTPATIENT
Start: 2022-02-09 | End: 2022-02-10

## 2022-02-09 RX ADMIN — Medication 100 MILLIGRAM(S): at 22:12

## 2022-02-09 RX ADMIN — VALSARTAN 160 MILLIGRAM(S): 80 TABLET ORAL at 17:57

## 2022-02-09 RX ADMIN — SIMVASTATIN 40 MILLIGRAM(S): 20 TABLET, FILM COATED ORAL at 22:09

## 2022-02-09 RX ADMIN — Medication 81 MILLIGRAM(S): at 13:38

## 2022-02-09 RX ADMIN — GABAPENTIN 300 MILLIGRAM(S): 400 CAPSULE ORAL at 17:52

## 2022-02-09 NOTE — H&P PST ADULT - NSICDXPASTMEDICALHX_GEN_ALL_CORE_FT
PAST MEDICAL HISTORY:  Bronchitis, asthmatic     Chronic back pain     Diverticulitis     Essential hypertension     H/O carpal tunnel repair     High cholesterol

## 2022-02-09 NOTE — DISCHARGE NOTE PROVIDER - CARE PROVIDER_API CALL
RANJITH CNATU  Cardiovascular Diseases  [address] [place] [address].  [place] [id number]  Phone: [phone]  Fax: [phone]  Follow Up Time: 2 weeks

## 2022-02-09 NOTE — DISCHARGE NOTE PROVIDER - HOSPITAL COURSE
61 year od male with PMHx of hypertension, hyperlipidemia, diverticulitis, asthma, CAD(nonobstructive disease pLad, D1 2012), former smoker who had admission to [facility] 8/2017 after found unresponsive with agonal breathing requiring intubation(questionable overdose as pt ran out of percocet and was given Suboxone). Pt extubated after 24hours.  Initial Echo at that time 8/30/2017 showed reduced LV systolic function with reduced EF 40%, this was transient as repeat Echo 9/5/2017 revealed EF of 70-75%, nml LV function and trace MR. He has had intermittent chest pain/BOATENG since 6/2020. Presented to office 1/26/2022 for progressing BOATENG/intermittent CP. Cardiac PET scan 1/12/22 with small area of ischemia at the basal anteriro and anteroseptal wall, so LHC recommended for further evaluation and characterization of cardiac structure and possible evolving CAD.  now s/p LHC via RFA with VENUS X 1 to pLAD(XIENCE 3.5x23). Procedure performed by  and closed with 6F Perclose.  Plan:  Admit to Dr Cabezas for monitoring overnight sec to complex lesion and potential for access complications  -Post procedure management/monitoring per protocol  -Access site precautions  -Bedrest x 2 hours post procedure  -Labs and EKG in am  -Repeat ECG if any clinical indication or change on tele  -Continue current medical therapy  -Dual anti platelet therapy with aspirin/plavix    -Consider BB if no bradycardia  High dose statin therapy    -Educated regarding strict adherence with DAPT   -Educated regarding post procedure management and care  -Discussed the importance of RF modification  -Cardiac rehab info provided/referral and communication to cardiac rehab completed  -F/U outpt in 1-2 weeks with Cardiologist Dr. Feliciano  -DISPO: Plan for D/C in am if remains HDS, ECG and labs in am stable and without complications       61 year od male with PMHx of hypertension, hyperlipidemia, diverticulitis, asthma, CAD(nonobstructive disease pLad, D1 2012), former smoker who had admission to [facility] 8/2017 after found unresponsive with agonal breathing requiring intubation(questionable overdose as pt ran out of percocet and was given Suboxone). Pt extubated after 24hours.  Initial Echo at that time 8/30/2017 showed reduced LV systolic function with reduced EF 40%, this was transient as repeat Echo 9/5/2017 revealed EF of 70-75%, nml LV function and trace MR. He has had intermittent chest pain/BOATENG since 6/2020. Presented to office 1/26/2022 for progressing BOATENG/intermittent CP. Cardiac PET scan 1/12/22 with small area of ischemia at the basal anteriro and anteroseptal wall, so LHC recommended for further evaluation and characterization of cardiac structure and possible evolving CAD.  now s/p LHC via RFA with VENUS X 1 to pLAD(XIENCE 3.5x23). Procedure performed by  and closed with 6F Perclose.

## 2022-02-09 NOTE — H&P PST ADULT - ASSESSMENT
Risk Assessments:  ASA:  Mallampati:  GFR:   Cr:  BRA:      Impression:    Plan:    -plan for *** via ***  -patient seen and examined  -confirmed appropriate NPO duration  -ECG and Labs reviewed  -Aspirin 81mg po pre-cath  -procedure discussed with patient; risks and benefits explained, questions answered  -consent obtained by attending IC   Risk Assessments:  ASA:  2  Mallampati:  2  GFR:   Cr:  BRA:      Impression:  60 y/o M with c/o progressive BOATENG with PET scan which reveals small area of ischemia    Plan:    -plan for [facility]  -patient seen and examined  -confirmed appropriate NPO duration  -ECG and Labs reviewed  -Aspirin 81mg po pre-cath  -procedure discussed with patient; risks and benefits explained, questions answered  -consent obtained by attending IC

## 2022-02-09 NOTE — DISCHARGE NOTE PROVIDER - NSDCMRMEDTOKEN_GEN_ALL_CORE_FT
aspirin 81 mg oral tablet:   gabapentin 300 mg oral capsule: 1 cap(s) orally 2 times a day  mesalamine 800 mg oral delayed release tablet:   multivitamin: orally once a day  simvastatin 40 mg oral tablet: 1 tab(s) orally once a day (at bedtime)  simvastatin 40 mg oral tablet: 1 tab(s) orally once a day (at bedtime)  traZODone 100 mg oral tablet: 2 tab(s) orally once a day (at bedtime)  valsartan-hydrochlorothiazide 160mg-12.5mg oral tablet: 1 tab(s) orally once a day   aspirin 81 mg oral tablet: 1 tab(s) orally once a day  atorvastatin 80 mg oral tablet: 1 tab(s) orally once a day   clopidogrel 75 mg oral tablet: 1 tab(s) orally once a day  gabapentin 300 mg oral capsule: 1 cap(s) orally 2 times a day  mesalamine 800 mg oral delayed release tablet: 1 tab(s) orally 3 times a day  Metoprolol Succinate ER 25 mg oral tablet, extended release: 0.5 tab(s) orally once a day   multivitamin: orally once a day  traZODone 100 mg oral tablet: 2 tab(s) orally once a day (at bedtime)  valsartan-hydrochlorothiazide 160mg-12.5mg oral tablet: 1 tab(s) orally once a day

## 2022-02-09 NOTE — DISCHARGE NOTE PROVIDER - NSDCCPCAREPLAN_GEN_ALL_CORE_FT
PRINCIPAL DISCHARGE DIAGNOSIS  Diagnosis: CAD (coronary artery disease)  Assessment and Plan of Treatment:        PRINCIPAL DISCHARGE DIAGNOSIS  Diagnosis: Coronary artery disease of native artery of native heart with stable angina pectoris  Assessment and Plan of Treatment:   Post procedure instructions explained.  Medications:        DAPT: Plavix 75 mg daily and aspirin 81 mg daily       Statin: Change to Lipitor 80 mg daily       Beta Blocker: No bradycardia noted, will start Toprol 12.5 mg daily       RAAS Inhibition: Will continue valsartan-HCTZ 160-12.5 mg daily       Other Medications: N/A  Cardiac rehab info provided/referral and communication to cardiac rehab completed  Aggressive lifestyle modification.      SECONDARY DISCHARGE DIAGNOSES  Diagnosis: Primary hypertension  Assessment and Plan of Treatment:   No bradycardia noted, will start Toprol 12.5 mg daily  Will continue valsartan-HCTZ 160-12.5 mg daily    Diagnosis: Mixed hyperlipidemia  Assessment and Plan of Treatment:   Goal Non-HDL < 100, LDL < 70  Lipid Panel: Not controlled  Statin: Change to Lipitor 80 mg daily  Other: N/A    Diagnosis: Prediabetes  Assessment and Plan of Treatment:   Teaching started

## 2022-02-09 NOTE — DISCHARGE NOTE PROVIDER - NSDCFUADDINST_GEN_ALL_CORE_FT
No heavy lifting, driving, sex, tub baths, swimming, or any activity that submerges the lower half of the body in water for 48 hours.  Limited walking and stairs for 48 hours.    Change the bandaid after 24 hours and every 24 hours after that.  Keep the puncture site dry and covered with a bandaid until a scab forms.    Observe the site frequently.  If bleeding or a large lump (the size of a golf ball or bigger) occurs lie flat, apply continuous direct pressure just above the puncture site for at least 10 minutes, and notify your physician immediately.  If the bleeding cannot be controlled, call 911 immediately for assistance.  Notify your physician of pain, swelling or any drainage.    Notify your physician immediately if coldness, numbness, discoloration or pain in your foot occurs.    Choose lean meats and poultry without skin and prepare them without added saturated and trans fat.  Eat fish at least twice a week. Recent research shows that eating oily fish containing omega-3 fatty acids (for example, salmon, trout and herring) may help lower your risk of death from coronary artery disease.  Select fat-free, 1 percent fat and low-fat dairy products.  Cut back on foods containing partially hydrogenated vegetable oils to reduce trans fat in your diet.   To lower cholesterol, reduce saturated fat to no more than 5 to 6 percent of total calories. For someone eating 2,000 calories a day, that’s about 13 grams of saturated fat.  Cut back on beverages and foods with added sugars.  Choose and prepare foods with little or no salt. To lower blood pressure, aim to eat no more than 2,400 milligrams of sodium per day. Reducing daily intake to 1,500 mg is desirable because it can lower blood pressure even further.  If you drink alcohol, drink in moderation. That means one drink per day if you’re a woman and two drinks  per day if you’re a man.  Follow the American Heart Association recommendations when you eat out, and keep an eye on your portion sizes.

## 2022-02-09 NOTE — H&P PST ADULT - HISTORY OF PRESENT ILLNESS
Narrative:     Symptoms:        Angina (Class):        Ischemic Symptoms:     Heart Failure:        Systolic/Diastolic/Combined:        NYHA Class (within 2 weeks):     Assessment of LVEF (Must be within 6 months):       EF:        Assessed by:        Date:     Prior Cardiac Interventions (LHC, stents, CABG):       PCI's (Date, Stents, Vessels):        CABG (Date, Grafts):     Noninvasive Testing:   Stress Test: Date:        Protocol:        Duration of Exercise:        Symptoms:        EKG Changes:        DTS:        Myocardial Imaging:        Risk Assessment (Low, Medium, High):     Echo (Date, Findings):     Antianginal Therapies:        Beta Blockers:         Calcium Channel Blockers:        Long Acting Nitrates:        Ranexa:     Associated Risk Factors:        Cerebrovascular Disease: N/A       Chronic Lung Disease: N/A       Peripheral Arterial Disease: N/A       Chronic Kidney Disease (if yes, what is GFR): N/A       Uncontrolled Diabetes (if yes, what is HgbA1C or FBS): N/A       Poorly Controlled Hypertension (if yes, what is SBP): N/A       Morbid Obesity (if yes, what is BMI): N/A       History of Recent Ventricular Arrhythmia: N/A       Inability to Ambulate Safely: N/A       Need for Therapeutic Anticoagulation: N/A       Antiplatelet or Contrast Allergy: N/A    Social History:        Marital:         Tobacco:        ETOH:        Caffeine:     ROS:  CONSTITUTIONAL: No weakness, fevers or chills  EYES/ENT: No visual changes;  No vertigo or throat pain   NECK: No pain or stiffness  RESPIRATORY: No cough, wheezing, hemoptysis; No shortness of breath  CARDIOVASCULAR: No chest pain or palpitations  GASTROINTESTINAL: No abdominal or epigastric pain. No nausea, vomiting, or hematemesis; No diarrhea or constipation. No melena or hematochezia.  GENITOURINARY: No dysuria, frequency or hematuria  NEUROLOGICAL: No numbness or weakness  SKIN: No itching, burning, rashes, or lesions   All other review of systems is negative unless indicated above    PHYSICAL EXAM:    Vital Signs Last 24 Hrs  T(C): --  T(F): --  HR: --  BP: --  BP(mean): --  RR: --  SpO2: --    GENERAL: Pt lying comfortably, NAD.  ENMT: PERRL, +EOMI.  NECK: soft, Supple, No JVD,   CHEST/LUNG: Clear to auscultatation bilaterally; No wheezing.  HEART: S1S2+, Regular rate and rhythm; No murmurs.  ABDOMEN: Soft, Nontender, Nondistended; Bowel sounds present.  MUSCULOSKELETAL: Normal range of motion.  SKIN: No rashes or lesions.  NEURO: AAOX3, no focal deficits, no motor r sensory loss.  PSYCH: normal mood.  VASCULAR:   Radial +2 R/+2 L  Femoral +2 R/+2 L  PT +2 R/+2 L  DP +2 R/+2 L    EKG:    Narrative: 61 year od male with PMHx of hypertension, hyperlipidemia, diverticulitis, asthma, CAD(nonobstructive disease pLad, D1 2012), former smoker who had admission to [facility] 8/2017 after found unresponsive with agonal breathing requiring intubation(questionable overdose as pt ran out of percocet and was given Suboxone). Pt extubated after 24hours.  Initial Echo at that time 8/30/2017 showed reduced LV systolic function with reduced EF 40%, this was transient as repeat Echo 9/5/2017 revealed EF of 70-75%, nml LV function and trace MR. He has had intermittent chest pain/BOATENG since 6/2020. Presented to office 1/26/2022 for progressing BOATENG/intermittent CP. Cardiac PET scan 1/12/22 with small area of ischemia at the basal anteriro and anteroseptal wall, so [facility] recommended for further evaluation and characterization of cardiac structure and possible evolving CAD.    Symptoms:        Angina (Class):        Ischemic Symptoms:     Heart Failure: no        Systolic/Diastolic/Combined:        NYHA Class (within 2 weeks):     Assessment of LVEF (Must be within 6 months):       EF: 60-65%       Assessed by: TTE        Date: 12/10/2021    Prior Cardiac Interventions (LHC, stents, CABG): 2012       PCI's (Date, Stents, Vessels):        CABG (Date, Grafts):     Noninvasive Testing: Cardiac PET scan 1/12/22 with small area of ischemia at the basal anteriro and anteroseptal wall.   Stress Test: Date:        Protocol:        Duration of Exercise:        Symptoms:        EKG Changes:        DTS:        Myocardial Imaging:        Risk Assessment (Low, Medium, High):     Echo (Date, Findings): 12/10/2021 EF 60-65%. MAC with trace MR/TRI, mild aortic insufficiency,     Antianginal Therapies:        Beta Blockers:         Calcium Channel Blockers:        Long Acting Nitrates:        Ranexa:     Associated Risk Factors:        Cerebrovascular Disease: N/A       Chronic Lung Disease: N/A       Peripheral Arterial Disease: N/A       Chronic Kidney Disease (if yes, what is GFR): N/A       Uncontrolled Diabetes (if yes, what is HgbA1C or FBS): N/A       Poorly Controlled Hypertension (if yes, what is SBP): N/A       Morbid Obesity (if yes, what is BMI): N/A       History of Recent Ventricular Arrhythmia: N/A       Inability to Ambulate Safely: N/A       Need for Therapeutic Anticoagulation: N/A       Antiplatelet or Contrast Allergy: N/A    Social History:        Marital:         Tobacco:        ETOH:        Caffeine:     ROS:  CONSTITUTIONAL: No weakness, fevers or chills  EYES/ENT: No visual changes;  No vertigo or throat pain   NECK: No pain or stiffness  RESPIRATORY: No cough, wheezing, hemoptysis; No shortness of breath  CARDIOVASCULAR: No chest pain or palpitations  GASTROINTESTINAL: No abdominal or epigastric pain. No nausea, vomiting, or hematemesis; No diarrhea or constipation. No melena or hematochezia.  GENITOURINARY: No dysuria, frequency or hematuria  NEUROLOGICAL: No numbness or weakness  SKIN: No itching, burning, rashes, or lesions   All other review of systems is negative unless indicated above    PHYSICAL EXAM:    Vital Signs Last 24 Hrs  T(C): --  T(F): --  HR: --  BP: --  BP(mean): --  RR: --  SpO2: --    GENERAL: Pt lying comfortably, NAD.  ENMT: PERRL, +EOMI.  NECK: soft, Supple, No JVD,   CHEST/LUNG: Clear to auscultatation bilaterally; No wheezing.  HEART: S1S2+, Regular rate and rhythm; No murmurs.  ABDOMEN: Soft, Nontender, Nondistended; Bowel sounds present.  MUSCULOSKELETAL: Normal range of motion.  SKIN: No rashes or lesions.  NEURO: AAOX3, no focal deficits, no motor r sensory loss.  PSYCH: normal mood.  VASCULAR:   Radial +2 R/+2 L  Femoral +2 R/+2 L  PT +2 R/+2 L  DP +2 R/+2 L    EKG:    Narrative: 61 year od male with PMHx of hypertension, hyperlipidemia, diverticulitis, asthma, CAD(nonobstructive disease pLad, D1 2012), former smoker who had admission to [facility] 8/2017 after found unresponsive with agonal breathing requiring intubation(questionable overdose as pt ran out of percocet and was given Suboxone). Pt extubated after 24hours.  Initial Echo at that time 8/30/2017 showed reduced LV systolic function with reduced EF 40%, this was transient as repeat Echo 9/5/2017 revealed EF of 70-75%, nml LV function and trace MR. He has had intermittent chest pain/BOATENG since 6/2020. Presented to office 1/26/2022 for progressing BOATENG/intermittent CP. Cardiac PET scan 1/12/22 with small area of ischemia at the basal anteriro and anteroseptal wall, so [facility] recommended for further evaluation and characterization of cardiac structure and possible evolving CAD.    Symptoms:        Angina (Class):        Ischemic Symptoms: BOATENG    Heart Failure: no        Systolic/Diastolic/Combined: NA       NYHA Class (within 2 weeks):     Assessment of LVEF (Must be within 6 months):       EF: 60-65%       Assessed by: TTE        Date: 12/10/2021    Prior Cardiac Interventions (LHC, stents, CABG): 2012       PCI's (Date, Stents, Vessels):        CABG (Date, Grafts):     Noninvasive Testing: Cardiac PET scan 1/12/22 with small area of ischemia at the basal anteriro and anteroseptal wall.     Echo (Date, Findings): 12/10/2021 EF 60-65%. MAC with trace MR/TRI, mild aortic insufficiency,     Antianginal Therapies:        Beta Blockers:         Calcium Channel Blockers:        Long Acting Nitrates:        Ranexa:     Associated Risk Factors:        Cerebrovascular Disease: N/A       Chronic Lung Disease: Bronchitis       Peripheral Arterial Disease: N/A       Chronic Kidney Disease (if yes, what is GFR): N/A       Uncontrolled Diabetes (if yes, what is HgbA1C or FBS): N/A       Poorly Controlled Hypertension (if yes, what is SBP): N/A       Morbid Obesity (if yes, what is BMI): N/A       History of Recent Ventricular Arrhythmia: N/A       Inability to Ambulate Safely: N/A       Need for Therapeutic Anticoagulation: N/A       Antiplatelet or Contrast Allergy: N/A    Social History:        Marital:         Tobacco: Former smoker 2 ppd x 10 years. Quit 38 years ago       ETOH: Never       Caffeine: 2 cups coffee daily    ROS:  CONSTITUTIONAL: No weakness, fevers or chills  EYES/ENT: No visual changes;  No vertigo or throat pain   NECK: No pain or stiffness  RESPIRATORY: BOATENG and wheezing; No cough, wheezing, hemoptysis   CARDIOVASCULAR: No chest pain or palpitations  GASTROINTESTINAL: No abdominal or epigastric pain. No nausea, vomiting, or hematemesis; No diarrhea or constipation. No melena or hematochezia.  GENITOURINARY: No dysuria, frequency or hematuria  NEUROLOGICAL: No numbness or weakness  SKIN: No itching, burning, rashes, or lesions   All other review of systems is negative unless indicated above      GENERAL: Pt lying comfortably, NAD.  ENMT: PERRL, +EOMI.  NECK: soft, Supple, No JVD,   CHEST/LUNG: Clear to auscultatation bilaterally; No wheezing.  HEART: S1S2+, Regular rate and rhythm; No murmurs.  ABDOMEN: Soft, Nontender, Nondistended; Bowel sounds present.  MUSCULOSKELETAL: Normal range of motion.  SKIN: No rashes or lesions.  NEURO: AAOX3, no focal deficits, no motor r sensory loss.  PSYCH: normal mood.  VASCULAR:   Radial +2 R/+2 L  Femoral +2 R/+2 L  PT +2 R/+2 L  DP +2 R/+2 L    EKG: SR

## 2022-02-09 NOTE — DISCHARGE NOTE PROVIDER - NSDCCPTREATMENT_GEN_ALL_CORE_FT
PRINCIPAL PROCEDURE  Procedure: Left heart catheterization  Findings and Treatment: VENUS X 1 pLAD; DAPT/STATIN/ARB       PRINCIPAL PROCEDURE  Procedure: Initial percutaneous transluminal insertion of drug eluting stent into left anterior descending macey  Findings and Treatment:   HEMODYNAMICS: Hemodynamic assessment demonstrates normal LVEDP.  VENTRICLES: Global left ventricular function was normal. EF calculated by contrast ventriculography was 55 %.  CORONARY VESSELS: The coronary circulation is left dominant.  LM:     --  Distal left main: There was a 30 % stenosis.  LAD:     --  Proximal LAD: There was a 80 % stenosis. It was treated with a XIENCE SKYPOINT VENUS 3.50 X 23 RX US drug-eluting stent.  --  Distal LAD: Angiography showed minor luminal irregularities with no flow limiting lesions.  CX:     --  Mid circumflex: Angiography showed minor luminal irregularities with no flow limiting lesions.  --  LPDA: There was a 30 % stenosis.  RCA:     --  Proximal RCA: Normal.  --  Mid RCA: Normal.

## 2022-02-09 NOTE — ASU PATIENT PROFILE, ADULT - FALL HARM RISK - UNIVERSAL INTERVENTIONS
No Bed in lowest position, wheels locked, appropriate side rails in place/Call bell, personal items and telephone in reach/Instruct patient to call for assistance before getting out of bed or chair/Non-slip footwear when patient is out of bed/Kelly to call system/Physically safe environment - no spills, clutter or unnecessary equipment/Purposeful Proactive Rounding/Room/bathroom lighting operational, light cord in reach

## 2022-02-10 ENCOUNTER — TRANSCRIPTION ENCOUNTER (OUTPATIENT)
Age: 62
End: 2022-02-10

## 2022-02-10 VITALS
SYSTOLIC BLOOD PRESSURE: 134 MMHG | HEART RATE: 62 BPM | RESPIRATION RATE: 18 BRPM | OXYGEN SATURATION: 95 % | TEMPERATURE: 98 F | DIASTOLIC BLOOD PRESSURE: 75 MMHG

## 2022-02-10 LAB
ANION GAP SERPL CALC-SCNC: 11 MMOL/L — SIGNIFICANT CHANGE UP (ref 5–17)
BASOPHILS # BLD AUTO: 0.04 K/UL — SIGNIFICANT CHANGE UP (ref 0–0.2)
BASOPHILS NFR BLD AUTO: 0.5 % — SIGNIFICANT CHANGE UP (ref 0–2)
BUN SERPL-MCNC: 23.4 MG/DL — HIGH (ref 8–20)
CALCIUM SERPL-MCNC: 9 MG/DL — SIGNIFICANT CHANGE UP (ref 8.6–10.2)
CHLORIDE SERPL-SCNC: 102 MMOL/L — SIGNIFICANT CHANGE UP (ref 98–107)
CO2 SERPL-SCNC: 25 MMOL/L — SIGNIFICANT CHANGE UP (ref 22–29)
CREAT SERPL-MCNC: 0.94 MG/DL — SIGNIFICANT CHANGE UP (ref 0.5–1.3)
EOSINOPHIL # BLD AUTO: 0.43 K/UL — SIGNIFICANT CHANGE UP (ref 0–0.5)
EOSINOPHIL NFR BLD AUTO: 5.4 % — SIGNIFICANT CHANGE UP (ref 0–6)
GLUCOSE SERPL-MCNC: 111 MG/DL — HIGH (ref 70–99)
HCT VFR BLD CALC: 49.6 % — SIGNIFICANT CHANGE UP (ref 39–50)
HGB BLD-MCNC: 17.2 G/DL — HIGH (ref 13–17)
IMM GRANULOCYTES NFR BLD AUTO: 0.4 % — SIGNIFICANT CHANGE UP (ref 0–1.5)
LYMPHOCYTES # BLD AUTO: 1.62 K/UL — SIGNIFICANT CHANGE UP (ref 1–3.3)
LYMPHOCYTES # BLD AUTO: 20.4 % — SIGNIFICANT CHANGE UP (ref 13–44)
MCHC RBC-ENTMCNC: 30.6 PG — SIGNIFICANT CHANGE UP (ref 27–34)
MCHC RBC-ENTMCNC: 34.7 GM/DL — SIGNIFICANT CHANGE UP (ref 32–36)
MCV RBC AUTO: 88.3 FL — SIGNIFICANT CHANGE UP (ref 80–100)
MONOCYTES # BLD AUTO: 0.85 K/UL — SIGNIFICANT CHANGE UP (ref 0–0.9)
MONOCYTES NFR BLD AUTO: 10.7 % — SIGNIFICANT CHANGE UP (ref 2–14)
NEUTROPHILS # BLD AUTO: 4.99 K/UL — SIGNIFICANT CHANGE UP (ref 1.8–7.4)
NEUTROPHILS NFR BLD AUTO: 62.6 % — SIGNIFICANT CHANGE UP (ref 43–77)
PLATELET # BLD AUTO: 248 K/UL — SIGNIFICANT CHANGE UP (ref 150–400)
POTASSIUM SERPL-MCNC: 4 MMOL/L — SIGNIFICANT CHANGE UP (ref 3.5–5.3)
POTASSIUM SERPL-SCNC: 4 MMOL/L — SIGNIFICANT CHANGE UP (ref 3.5–5.3)
RBC # BLD: 5.62 M/UL — SIGNIFICANT CHANGE UP (ref 4.2–5.8)
RBC # FLD: 13.2 % — SIGNIFICANT CHANGE UP (ref 10.3–14.5)
SODIUM SERPL-SCNC: 138 MMOL/L — SIGNIFICANT CHANGE UP (ref 135–145)
WBC # BLD: 7.96 K/UL — SIGNIFICANT CHANGE UP (ref 3.8–10.5)
WBC # FLD AUTO: 7.96 K/UL — SIGNIFICANT CHANGE UP (ref 3.8–10.5)

## 2022-02-10 PROCEDURE — C9600: CPT | Mod: LD

## 2022-02-10 PROCEDURE — 80048 BASIC METABOLIC PNL TOTAL CA: CPT

## 2022-02-10 PROCEDURE — 86850 RBC ANTIBODY SCREEN: CPT

## 2022-02-10 PROCEDURE — C1725: CPT

## 2022-02-10 PROCEDURE — 86901 BLOOD TYPING SEROLOGIC RH(D): CPT

## 2022-02-10 PROCEDURE — C1753: CPT

## 2022-02-10 PROCEDURE — 99153 MOD SED SAME PHYS/QHP EA: CPT

## 2022-02-10 PROCEDURE — 93005 ELECTROCARDIOGRAM TRACING: CPT

## 2022-02-10 PROCEDURE — 99152 MOD SED SAME PHYS/QHP 5/>YRS: CPT

## 2022-02-10 PROCEDURE — 86900 BLOOD TYPING SEROLOGIC ABO: CPT

## 2022-02-10 PROCEDURE — 85025 COMPLETE CBC W/AUTO DIFF WBC: CPT

## 2022-02-10 PROCEDURE — 80061 LIPID PANEL: CPT

## 2022-02-10 PROCEDURE — C1894: CPT

## 2022-02-10 PROCEDURE — C1769: CPT

## 2022-02-10 PROCEDURE — 92978 ENDOLUMINL IVUS OCT C 1ST: CPT | Mod: LD

## 2022-02-10 PROCEDURE — 80053 COMPREHEN METABOLIC PANEL: CPT

## 2022-02-10 PROCEDURE — 83735 ASSAY OF MAGNESIUM: CPT

## 2022-02-10 PROCEDURE — 93010 ELECTROCARDIOGRAM REPORT: CPT

## 2022-02-10 PROCEDURE — 93458 L HRT ARTERY/VENTRICLE ANGIO: CPT | Mod: 59

## 2022-02-10 PROCEDURE — C1874: CPT

## 2022-02-10 PROCEDURE — C1760: CPT

## 2022-02-10 PROCEDURE — 36415 COLL VENOUS BLD VENIPUNCTURE: CPT

## 2022-02-10 PROCEDURE — 83036 HEMOGLOBIN GLYCOSYLATED A1C: CPT

## 2022-02-10 PROCEDURE — C1887: CPT

## 2022-02-10 RX ORDER — ACETAMINOPHEN 500 MG
650 TABLET ORAL ONCE
Refills: 0 | Status: COMPLETED | OUTPATIENT
Start: 2022-02-10 | End: 2022-02-10

## 2022-02-10 RX ORDER — MESALAMINE 400 MG
0 TABLET, DELAYED RELEASE (ENTERIC COATED) ORAL
Qty: 0 | Refills: 0 | DISCHARGE

## 2022-02-10 RX ORDER — CLOPIDOGREL BISULFATE 75 MG/1
1 TABLET, FILM COATED ORAL
Qty: 90 | Refills: 3
Start: 2022-02-10 | End: 2023-02-04

## 2022-02-10 RX ORDER — ASPIRIN/CALCIUM CARB/MAGNESIUM 324 MG
0 TABLET ORAL
Qty: 0 | Refills: 0 | DISCHARGE

## 2022-02-10 RX ORDER — SIMVASTATIN 20 MG/1
1 TABLET, FILM COATED ORAL
Qty: 0 | Refills: 0 | DISCHARGE

## 2022-02-10 RX ORDER — ATORVASTATIN CALCIUM 80 MG/1
1 TABLET, FILM COATED ORAL
Qty: 90 | Refills: 0
Start: 2022-02-10 | End: 2022-05-10

## 2022-02-10 RX ORDER — METOPROLOL TARTRATE 50 MG
0.5 TABLET ORAL
Qty: 45 | Refills: 0
Start: 2022-02-10 | End: 2022-05-10

## 2022-02-10 RX ADMIN — Medication 81 MILLIGRAM(S): at 07:59

## 2022-02-10 RX ADMIN — GABAPENTIN 300 MILLIGRAM(S): 400 CAPSULE ORAL at 06:02

## 2022-02-10 RX ADMIN — CLOPIDOGREL BISULFATE 75 MILLIGRAM(S): 75 TABLET, FILM COATED ORAL at 07:59

## 2022-02-10 RX ADMIN — Medication 650 MILLIGRAM(S): at 07:52

## 2022-02-10 RX ADMIN — VALSARTAN 160 MILLIGRAM(S): 80 TABLET ORAL at 06:03

## 2022-02-10 RX ADMIN — Medication 650 MILLIGRAM(S): at 06:05

## 2022-02-10 NOTE — DISCHARGE NOTE NURSING/CASE MANAGEMENT/SOCIAL WORK - NSDCPEFALRISK_GEN_ALL_CORE
For information on Fall & Injury Prevention, visit: [email or web address] OR  [email or web address] OR  https://www.cdc.gov/steadi/patient.html

## 2022-02-10 NOTE — PROGRESS NOTE ADULT - ASSESSMENT
Procedure: LHC and PCI of the     1. S/P LHC and PCI of the   ·	Post procedure instructions explained.  ·	Medications:   ·	     DAPT: Plavix 75 mg daily and aspirin 81 mg daily  ·	     Statin: Change to Lipitor 80 mg daily  ·	     Beta Blocker: No bradycardia noted, will start Toprol 12.5 mg daily  ·	     RAAS Inhibition: Will continue valsartan-HCTZ 160-12.5 mg daily  ·	     Other Medications: N/A  ·	Cardiac rehab info provided/referral and communication to cardiac rehab completed  ·	Aggressive lifestyle modification.    2. Mixed HLD  ·	Goal Non-HDL < 100, LDL < 70  ·	Lipid Panel: Not controlled  ·	Statin: Change to Lipitor 80 mg daily  ·	Other: N/A    3. Pre-diabetes  ·	Teaching started    4. HTN  ·	No bradycardia noted, will start Toprol 12.5 mg daily  ·	Will continue valsartan-HCTZ 160-12.5 mg daily    Discharge Planning:   ·	Discharge home today  ·	Follow up with: Dr. Feliciano

## 2022-02-10 NOTE — DISCHARGE NOTE NURSING/CASE MANAGEMENT/SOCIAL WORK - PATIENT PORTAL LINK FT
You can access the FollowMyHealth Patient Portal offered by [facility] by registering at the following website: [email or web address]. By joining [employer]’s FollowMyHealth portal, you will also be able to view your health information using other applications (apps) compatible with our system.

## 2022-02-10 NOTE — PROGRESS NOTE ADULT - SUBJECTIVE AND OBJECTIVE BOX
Department of Cardiology                                                                  [facility]/[place] [address] [address] [facility]                                                            Telephone: [phone]. Fax:[phone]                                                                                         CARDIOLOGY NOTE       Subjective:  61y Male who had a left heart catheterization which showed:  HEMODYNAMICS: Hemodynamic assessment demonstrates normal LVEDP.  VENTRICLES: Global left ventricular function was normal. EF calculated by contrast ventriculography was 55 %.  CORONARY VESSELS: The coronary circulation is left dominant.  LM:     --  Distal left main: There was a 30 % stenosis.  LAD:     --  Proximal LAD: There was a 80 % stenosis. It was treated with a XIENCE SKYPOINT VENUS 3.50 X 23 RX US drug-eluting stent.  --  Distal LAD: Angiography showed minor luminal irregularities with no flow limiting lesions.  CX:     --  Mid circumflex: Angiography showed minor luminal irregularities with no flow limiting lesions.  --  LPDA: There was a 30 % stenosis.  RCA:     --  Proximal RCA: Normal.  --  Mid RCA: Normal.    Interval history: No chest pain, SOB, or palpitations overnight.    HPI: 61 year od male with PMHx of hypertension, hyperlipidemia, diverticulitis, asthma, CAD(nonobstructive disease pLad, D1 2012), former smoker who had admission to [facility] 8/2017 after found unresponsive with agonal breathing requiring intubation(questionable overdose as pt ran out of percocet and was given Suboxone). Pt extubated after 24hours.  Initial Echo at that time 8/30/2017 showed reduced LV systolic function with reduced EF 40%, this was transient as repeat Echo 9/5/2017 revealed EF of 70-75%, nml LV function and trace MR. He has had intermittent chest pain/BOATENG since 6/2020. Presented to office 1/26/2022 for progressing BOATENG/intermittent CP. Cardiac PET scan 1/12/22 with small area of ischemia at the basal anteriro and anteroseptal wall, so [facility] recommended for further evaluation and characterization of cardiac structure and possible evolving CAD.    PAST MEDICAL & SURGICAL HISTORY:  Essential hypertension  High cholesterol  Chronic back pain  Diverticulitis  Bronchitis, asthmatic  H/O carpal tunnel repair    Allergies  ·	latex (Rash)  ·	No Known Drug Allergies    Home Medications:  aspirin 81 mg oral tablet:  (09 Feb 2022 19:23)  mesalamine 800 mg oral delayed release tablet:  (09 Feb 2022 19:23)  multivitamin: orally once a day (09 Feb 2022 19:23)  simvastatin 40 mg oral tablet: 1 tab(s) orally once a day (at bedtime) (09 Feb 2022 13:20)  simvastatin 40 mg oral tablet: 1 tab(s) orally once a day (at bedtime) (09 Feb 2022 19:23)  traZODone 100 mg oral tablet: 2 tab(s) orally once a day (at bedtime) (09 Feb 2022 13:20)  valsartan-hydrochlorothiazide 160mg-12.5mg oral tablet: 1 tab(s) orally once a day (09 Feb 2022 13:20)    MEDICATIONS  (STANDING):  aspirin enteric coated 81 milliGRAM(s) Oral daily  chlorhexidine 4% Liquid 1 Application(s) Topical Once  clopidogrel Tablet 75 milliGRAM(s) Oral daily  gabapentin 300 milliGRAM(s) Oral two times a day  mesalamine DR Capsule 800 milliGRAM(s) Oral daily  simvastatin 40 milliGRAM(s) Oral at bedtime  traZODone 100 milliGRAM(s) Oral at bedtime  valsartan 160 milliGRAM(s) Oral daily    MEDICATIONS  (PRN): N/A    Objective:  Vital Signs Last 24 Hrs  T(C): 36.8 (10 Feb 2022 08:03), Max: 36.8 (10 Feb 2022 08:03)  T(F): 98.2 (10 Feb 2022 08:03), Max: 98.2 (10 Feb 2022 08:03)  HR: 62 (10 Feb 2022 08:03) (58 - 97)  BP: 134/75 (10 Feb 2022 08:03) (124/79 - 167/96)  RR: 18 (10 Feb 2022 08:03) (16 - 19)  SpO2: 95% (10 Feb 2022 08:03) (95% - 99%)    CM: SR  Neuro: A&OX3, CN 2-12 intact  HEENT: NC, AT  Lungs: CTA B/L  CV: S1, S2, no murmur, RRR  Abd: Soft  Right Groin: Soft, no bleeding, no hematoma  Extremity: + distal pulses    EKG: NSR, LAD                          17.2   7.96  )-----------( 248      ( 10 Feb 2022 06:20 )             49.6     02-10    138  |  102  |  23.4  ----------------------------<  111  4.0   |  25.0  |  0.94    Ca    9.0      10 Feb 2022 06:20  Mg     1.9     02-09    TPro  7.4  /  Alb  4.5  /  TBili  0.8  /  DBili  x   /  AST  34  /  ALT  42<H>  /  AlkPhos  78  02-09    Lipids       Total Cholesterol: 184       Triglycerides: 183       HDL: 35       Non-HDL: 149       LDL: 112    HgbA1C: 5.8%
                                                                                                                             Post- Procedure Note: Left Heart Cardiac Catheterization         61y  Male now s/p LHC via RFA with VENUS X 1 to pLAD(XIENCE 3.5x23). Procedure performed by  and closed with 6F Perclose. He received Heparin 8000 units and Plavix 600 mg po, [id number], Fentanyl and Versed IV intraprocedurally, arrived to recovery in NAD and HDS, Post PCI ECG with no acute changes, RFA access site stable, no hematoma, minimal oozing, distal pulse +          PAST MEDICAL & SURGICAL HISTORY:  Essential hypertension    High cholesterol    Chronic back pain    Diverticulitis    Bronchitis, asthmatic    H/O carpal tunnel repair      Home Medications:  aspirin 81 mg oral tablet:  (2022 13:20)  mesalamine 800 mg oral delayed release tablet:  (:20)  multivitamin: orally once a day (:20)  simvastatin 40 mg oral tablet: 1 tab(s) orally once a day (at bedtime) (:20)  simvastatin 40 mg oral tablet: 1 tab(s) orally once a day (at bedtime) (:20)  traZODone 100 mg oral tablet: 2 tab(s) orally once a day (at bedtime) (2022 13:20)  valsartan-hydrochlorothiazide 160mg-12.5mg oral tablet: 1 tab(s) orally once a day (2022 13:20)    latex (Rash)  No Known Drug Allergies      Objective:  Vital Signs Last 24 Hrs  T(C): 36.7 (2022 13:21), Max: 36.7 (2022 13:21)  T(F): 98.1 (2022 13:21), Max: 98.1 (2022 13:21)  HR: 70 (2022 13:21) (70 - 70)  BP: 166/87 (2022 13:21) (166/87 - 166/87)  BP(mean): --  RR: 16 (2022 13:21) (16 - 16)  SpO2: 99% (:21) (99% - 99%)      PHYSICAL EXAM:  Constitutional: A & O x 3, NAD  HEENT:  Normal oral mucosa, PERRL, EOMI	  Cardiovascular: S1 S2, No murmurs, No JVD  Respiratory: Lungs clear to auscultation	  Gastrointestinal:  Soft, Non-tender, + BS	  Neurologic: No deficit appreciated  Extremities: No edema  Vascular: Access site stable, no bleed or hematoma, distal pulses +   EKG SR PRWP unchanged from pre procedure EKG    Labs:                           17.2   8.77  )-----------( 277      ( 2022 13:22 )             51.0     02    139  |  101  |  26.8<H>  ----------------------------<  101<H>  4.0   |  26.0  |  1.07    Ca    9.4      2022 13:22  Mg     1.9         TPro  7.4  /  Alb  4.5  /  TBili  0.8  /  DBili  x   /  AST  34  /  ALT  42<H>  /  AlkPhos  78      < from: Cardiac Catheterization (22 @ 14:08) >  [facility]  Department of Cardiology  [address] [place] [address]  [phone]  Cath Lab Report -- Comprehensive Report  Patient: KIMMY GRANT  Study date: 2022  Account number: [id number]  MR number: [id number]  : 1960  Gender: Male  Race: W  Case Physician(s):  Donny Cabezas MD  Referring Physician:  Bob Feliciano M.D.  INDICATIONS: High risk stress test  Dyspnea on exertion  HISTORY: There was no prior cardiac history. The patient has a former  history of cigarette use.  PROCEDURE:  --  Left heart catheterization with ventriculography.  --  Right coronary angiography.  --  Left coronary angiography.  --  Sonosite.  --  Sheath Exchange for Intervention.  --Interventional IVUS.  --  Hemostasis with Perclose-Intervention.  --  Intervention on proximal LAD: drug-eluting stent.  TECHNIQUE: The risks and alternatives of the procedures and conscious  sedation were explained to the patient and informed consent was obtained.  Cardiac catheterization performed electively. Coronary intervention  performed electively.  Local anesthetic given. Right femoral artery access. Left heart  catheterization. Ventriculography was performed. Right coronary artery  angiography. The vessel was injected utilizing a catheter. Left coronary  artery angiography. The vessel was injected utilizing a catheter.  Sonosite. RADIATION EXPOSURE: 15.4 min. A drug-eluting stent was performed  on the 80 % lesion in the proximal LAD.Following intervention there was a  1 % residual stenosis. There was DONI 3 flow before the procedure and DONI  3 flow after the procedure. There was no dissection. Vessel setup was  performed. A 6F EBU3.75 LAUNCHER guiding catheter was used to intubate the  vessel. Intravascular ultrasound was performed using a(n) EAGLE EYE SHORT  TIP catheter over a previously placed guidewire. Balloon angioplasty was  performed, using a 3.0 X 15 NC EUPHORA BALLOON balloon, with 2 inflations  and a maximum inflation pressure of 18 angelo. A XIENCE SKYPOINT VENUS 3.50 X  23 RX US drug-eluting stent was placed across the lesion and deployed at a  maximum inflation pressure of 14 angelo. Balloon angioplasty was performed,  using a 3.5 X 15 NC EUPHORA BALLOON balloon, with 5 inflations and a  maximum inflation pressure of 18 angelo. Sheath Exchange for Intervention.  Interventional IVUS. Hemostasis with Perclose-Intervention.  CONTRAST GIVEN: Omnipaque 64 ml. Omnipaque 104 ml.  MEDICATIONS GIVEN: Midazolam, 1 mg, IV. Fentanyl, 50 mcg, IV. Midazolam, 1  mg, IV. Fentanyl, 25 mcg, IV. Fentanyl, 25 mcg, IV. Heparin, 8000 units,  IV. Clopidogrel (Plavix), 600 mg, PO. 1% Lidocaine, 10 ml, subcutaneously.  HEMODYNAMICS: Hemodynamic assessment demonstrates normal LVEDP.  VENTRICLES: Global left ventricular function was normal. EF calculated by  contrast ventriculography was 55 %.  CORONARY VESSELS: The coronary circulation is left dominant.  LM:   --  Distal left main: There was a 30 % stenosis.  LAD:   --  Proximal LAD: There was a 80 % stenosis.  --  Distal LAD: Angiography showed minor luminal irregularities with no  flow limiting lesions.  CX:   --  Mid circumflex: Angiography showed minor luminal irregularities  with no flow limiting lesions.  --  LPDA: Therewas a 30 % stenosis.  RCA:   --  Proximal RCA: Normal.  --  Mid RCA: Normal.  COMPLICATIONS: There were no complications. No complications occurred  during the cath lab visit.  DIAGNOSTIC IMPRESSIONS: 80% proximal LAD stenosis s/p IVUS guided PTCA and  VENUS  DIAGNOSTIC RECOMMENDATIONS: ASA 81mg daily  Plavix 75 mg daily  Beta blocker  ace inhibitor  high dose statin  Lifestyle and risk factor modifications  INTERVENTIONAL IMPRESSIONS: 80% proximal LAD stenosis s/p IVUS guided PTCA  and VENUS  INTERVENTIONAL RECOMMENDATIONS: ASA 81mg daily  Plavix 75 mg daily  Beta blocker  ace inhibitor  high dose statin  Lifestyle and risk factor modifications  Prepared and signed by  Dnony Cabezas MD  Signed 2022 15:36:10  HEMODYNAMIC TABLES  Pressures: Baseline  Pressures:  - HR: 73  Pressures:  - Rhythm:  Pressures:  -- Aortic Pressure (S/D/M): 139/75/104  Pressures:  -- Left Ventricle (s/edp): 157/20/--  Outputs:  Baseline  Outputs:  -- CALCULATIONS[age]  Outputs:  -- CALCULATIONS: Body Surface Area: 1.94  Outputs:  -- CALCULATIONS: Height in cm: 170.00  Outputs:  -- CALCULATIONS: Sex: Male  Outputs:  -- CALCULATIONS: Weight in k.60  Outputs:  -- OUTPUTS: O2 consumption: 242.70  Outputs:  -- OUTPUTS: Vo2 Indexed: 125.00    < end of copied text >        Assessment:  61y  Male  with progressive BOATENG and abnormal PET scan  Now via RFA with VENUS X 1 to pLAD(XIENCE 3.5x23). Procedure performed by  and closed with 6F Perclose.    Plan:  Admit to Dr Cabezas for monitoring overnight sec to complex lesion and potential for access complications  -Post procedure management/monitoring per protocol  -Access site precautions  -Bedrest x 2 hours post procedure  -Labs and EKG in am  -Repeat ECG if any clinical indication or change on tele  -Continue current medical therapy  -Dual anti platelet therapy with aspirin/plavix    -Consider BB if no bradycardia  High dose statin therapy    -Educated regarding strict adherence with DAPT   -Educated regarding post procedure management and care  -Discussed the importance of RF modification  -Cardiac rehab info provided/referral and communication to cardiac rehab completed  -F/U outpt in 1-2 weeks with Cardiologist Dr. Feliciano  -DISPO: Plan for D/C in am if remains HDS, ECG and labs in am stable and without complications

## 2022-02-11 ENCOUNTER — NON-APPOINTMENT (OUTPATIENT)
Age: 62
End: 2022-02-11

## 2022-02-11 RX ORDER — METOPROLOL SUCCINATE 25 MG/1
25 TABLET, EXTENDED RELEASE ORAL DAILY
Refills: 0 | Status: ACTIVE | COMMUNITY
Start: 2022-02-11

## 2022-02-11 RX ORDER — CLOPIDOGREL BISULFATE 75 MG/1
75 TABLET, FILM COATED ORAL
Refills: 0 | Status: ACTIVE | COMMUNITY
Start: 2022-02-11

## 2022-02-14 ENCOUNTER — RX RENEWAL (OUTPATIENT)
Age: 62
End: 2022-02-14

## 2022-03-12 ENCOUNTER — RX CHANGE (OUTPATIENT)
Age: 62
End: 2022-03-12

## 2022-03-12 RX ORDER — TRAZODONE HYDROCHLORIDE 100 MG/1
100 TABLET ORAL
Qty: 60 | Refills: 1 | Status: DISCONTINUED | COMMUNITY
Start: 2017-09-25 | End: 2022-03-12

## 2022-04-06 PROBLEM — Z98.890 OTHER SPECIFIED POSTPROCEDURAL STATES: Chronic | Status: ACTIVE | Noted: 2022-02-09

## 2022-04-06 PROBLEM — K57.92 DIVERTICULITIS OF INTESTINE, PART UNSPECIFIED, WITHOUT PERFORATION OR ABSCESS WITHOUT BLEEDING: Chronic | Status: ACTIVE | Noted: 2022-02-09

## 2022-04-06 PROBLEM — J45.909 UNSPECIFIED ASTHMA, UNCOMPLICATED: Chronic | Status: ACTIVE | Noted: 2022-02-09

## 2022-04-18 ENCOUNTER — RX CHANGE (OUTPATIENT)
Age: 62
End: 2022-04-18

## 2022-04-25 ENCOUNTER — APPOINTMENT (OUTPATIENT)
Dept: INTERNAL MEDICINE | Facility: CLINIC | Age: 62
End: 2022-04-25
Payer: COMMERCIAL

## 2022-04-25 VITALS
HEART RATE: 69 BPM | SYSTOLIC BLOOD PRESSURE: 136 MMHG | OXYGEN SATURATION: 98 % | HEIGHT: 67 IN | BODY MASS INDEX: 28.25 KG/M2 | WEIGHT: 180 LBS | DIASTOLIC BLOOD PRESSURE: 60 MMHG

## 2022-04-25 PROCEDURE — 36415 COLL VENOUS BLD VENIPUNCTURE: CPT

## 2022-04-25 PROCEDURE — 99215 OFFICE O/P EST HI 40 MIN: CPT | Mod: 25

## 2022-04-25 RX ORDER — SIMVASTATIN 40 MG/1
40 TABLET, FILM COATED ORAL
Qty: 90 | Refills: 1 | Status: DISCONTINUED | COMMUNITY
Start: 2017-09-22 | End: 2022-04-25

## 2022-04-25 NOTE — PHYSICAL EXAM
[Well Nourished] : well nourished [Well Developed] : well developed [Well-Appearing] : well-appearing [Normal Sclera/Conjunctiva] : normal sclera/conjunctiva [PERRL] : pupils equal round and reactive to light [EOMI] : extraocular movements intact [Normal Outer Ear/Nose] : the outer ears and nose were normal in appearance [Normal Oropharynx] : the oropharynx was normal [No JVD] : no jugular venous distention [No Lymphadenopathy] : no lymphadenopathy [Supple] : supple [Thyroid Normal, No Nodules] : the thyroid was normal and there were no nodules present [No Respiratory Distress] : no respiratory distress  [No Accessory Muscle Use] : no accessory muscle use [Clear to Auscultation] : lungs were clear to auscultation bilaterally [Normal Rate] : normal rate  [Regular Rhythm] : with a regular rhythm [Normal S1, S2] : normal S1 and S2 [No Murmur] : no murmur heard [No Carotid Bruits] : no carotid bruits [No Abdominal Bruit] : a ~M bruit was not heard ~T in the abdomen [No Varicosities] : no varicosities [Pedal Pulses Present] : the pedal pulses are present [No Edema] : there was no peripheral edema [No Palpable Aorta] : no palpable aorta [No Extremity Clubbing/Cyanosis] : no extremity clubbing/cyanosis [Soft] : abdomen soft [Non-distended] : non-distended [No Masses] : no abdominal mass palpated [No HSM] : no HSM [Normal Bowel Sounds] : normal bowel sounds [Normal Posterior Cervical Nodes] : no posterior cervical lymphadenopathy [Normal Anterior Cervical Nodes] : no anterior cervical lymphadenopathy [No CVA Tenderness] : no CVA  tenderness [No Spinal Tenderness] : no spinal tenderness [No Joint Swelling] : no joint swelling [Grossly Normal Strength/Tone] : grossly normal strength/tone [No Rash] : no rash [Coordination Grossly Intact] : coordination grossly intact [No Focal Deficits] : no focal deficits [Normal Gait] : normal gait [Normal Affect] : the affect was normal [Normal Insight/Judgement] : insight and judgment were intact [de-identified] : MILD TENDERNESS BOTH LEFT AND RIGHT LOWER  QUADRANS MORE ON RIGHT THAN LEFT  UMBILICAL HERNIA

## 2022-04-25 NOTE — HISTORY OF PRESENT ILLNESS
[FreeTextEntry1] : POST HOPS FOLLOWUP  [de-identified] : 62yo MALE WITH HX OF HTN HLD  AND  DIVERTICULITIS  WITH RECENT HOSP STAY WITH CP AND UNDERWENT A CARDIAC CATH AND STENT Placement RECENTLY LOW DOSE BLOCKER ADDED AND PT FEELS OFF NO CP  \par

## 2022-04-25 NOTE — PLAN
[FreeTextEntry1] : 60yo MALE WITH HX OF HTN HLD  AND  DIVERTICULITIS  WITH RECENT HOSP STAY WITH CP AND UNDERWENT A CARDIAC CATH AND STENT Placement RECENTLY LOW DOSE BLOCKER ADDED AND PT FEELS OFF NO CP  \par PT MAY BE HAVING SIDE EFFECT FROM BETA BLOCKKER  AS THIS IS NEW TO HIS REGIMEN\par WILL RECOMMEND D/C THIS  AND FOLLOW UP WITH CARDIOLOGY\par PT HAS APPT WITH [facility]\par WILL CHECK LABS  \par SPOKE TO DAUGHTER ON THE PHONE \par

## 2022-05-05 LAB
ALBUMIN SERPL ELPH-MCNC: 4.8 G/DL
ALP BLD-CCNC: 112 U/L
ALT SERPL-CCNC: 33 U/L
ANION GAP SERPL CALC-SCNC: 13 MMOL/L
AST SERPL-CCNC: 27 U/L
BASOPHILS # BLD AUTO: 0.03 K/UL
BASOPHILS NFR BLD AUTO: 0.4 %
BILIRUB SERPL-MCNC: 0.8 MG/DL
BUN SERPL-MCNC: 23 MG/DL
CALCIUM SERPL-MCNC: 9.9 MG/DL
CHLORIDE SERPL-SCNC: 103 MMOL/L
CHOLEST SERPL-MCNC: 140 MG/DL
CO2 SERPL-SCNC: 27 MMOL/L
CREAT SERPL-MCNC: 1.07 MG/DL
EGFR: 78 ML/MIN/1.73M2
EOSINOPHIL # BLD AUTO: 0.32 K/UL
EOSINOPHIL NFR BLD AUTO: 4.3 %
ESTIMATED AVERAGE GLUCOSE: 120 MG/DL
GLUCOSE SERPL-MCNC: 100 MG/DL
HBA1C MFR BLD HPLC: 5.8 %
HCT VFR BLD CALC: 51.7 %
HDLC SERPL-MCNC: 31 MG/DL
HGB BLD-MCNC: 16.6 G/DL
IMM GRANULOCYTES NFR BLD AUTO: 0.3 %
LDLC SERPL CALC-MCNC: 78 MG/DL
LYMPHOCYTES # BLD AUTO: 1.86 K/UL
LYMPHOCYTES NFR BLD AUTO: 25 %
MAN DIFF?: NORMAL
MCHC RBC-ENTMCNC: 30.1 PG
MCHC RBC-ENTMCNC: 32.1 GM/DL
MCV RBC AUTO: 93.7 FL
MONOCYTES # BLD AUTO: 0.76 K/UL
MONOCYTES NFR BLD AUTO: 10.2 %
NEUTROPHILS # BLD AUTO: 4.45 K/UL
NEUTROPHILS NFR BLD AUTO: 59.8 %
NONHDLC SERPL-MCNC: 109 MG/DL
PLATELET # BLD AUTO: 278 K/UL
POTASSIUM SERPL-SCNC: 4.6 MMOL/L
PROT SERPL-MCNC: 7.4 G/DL
RBC # BLD: 5.52 M/UL
RBC # FLD: 13.6 %
SODIUM SERPL-SCNC: 143 MMOL/L
TRIGL SERPL-MCNC: 153 MG/DL
WBC # FLD AUTO: 7.44 K/UL

## 2022-05-08 ENCOUNTER — RX RENEWAL (OUTPATIENT)
Age: 62
End: 2022-05-08

## 2022-05-20 ENCOUNTER — RX RENEWAL (OUTPATIENT)
Age: 62
End: 2022-05-20

## 2022-05-23 ENCOUNTER — INPATIENT (INPATIENT)
Facility: HOSPITAL | Age: 62
LOS: 1 days | Discharge: HOME | DRG: 287 | End: 2022-05-25
Attending: HOSPITALIST | Admitting: HOSPITALIST
Payer: COMMERCIAL

## 2022-05-23 VITALS
SYSTOLIC BLOOD PRESSURE: 130 MMHG | RESPIRATION RATE: 20 BRPM | HEIGHT: 67 IN | HEART RATE: 173 BPM | OXYGEN SATURATION: 99 % | DIASTOLIC BLOOD PRESSURE: 92 MMHG

## 2022-05-23 DIAGNOSIS — I47.1 SUPRAVENTRICULAR TACHYCARDIA: ICD-10-CM

## 2022-05-23 LAB
ALBUMIN SERPL ELPH-MCNC: 4.5 G/DL — SIGNIFICANT CHANGE UP (ref 3.3–5.2)
ALP SERPL-CCNC: 129 U/L — HIGH (ref 40–120)
ALT FLD-CCNC: 117 U/L — HIGH
ANION GAP SERPL CALC-SCNC: 14 MMOL/L — SIGNIFICANT CHANGE UP (ref 5–17)
APTT BLD: 31.6 SEC — SIGNIFICANT CHANGE UP (ref 27.5–35.5)
AST SERPL-CCNC: 95 U/L — HIGH
BASOPHILS # BLD AUTO: 0.05 K/UL — SIGNIFICANT CHANGE UP (ref 0–0.2)
BASOPHILS NFR BLD AUTO: 0.5 % — SIGNIFICANT CHANGE UP (ref 0–2)
BILIRUB SERPL-MCNC: 0.6 MG/DL — SIGNIFICANT CHANGE UP (ref 0.4–2)
BUN SERPL-MCNC: 24.4 MG/DL — HIGH (ref 8–20)
CALCIUM SERPL-MCNC: 10 MG/DL — SIGNIFICANT CHANGE UP (ref 8.6–10.2)
CHLORIDE SERPL-SCNC: 103 MMOL/L — SIGNIFICANT CHANGE UP (ref 98–107)
CO2 SERPL-SCNC: 26 MMOL/L — SIGNIFICANT CHANGE UP (ref 22–29)
CREAT SERPL-MCNC: 1.08 MG/DL — SIGNIFICANT CHANGE UP (ref 0.5–1.3)
EGFR: 77 ML/MIN/1.73M2 — SIGNIFICANT CHANGE UP
EOSINOPHIL # BLD AUTO: 0.59 K/UL — HIGH (ref 0–0.5)
EOSINOPHIL NFR BLD AUTO: 6 % — SIGNIFICANT CHANGE UP (ref 0–6)
FLUAV AG NPH QL: SIGNIFICANT CHANGE UP
FLUBV AG NPH QL: SIGNIFICANT CHANGE UP
GLUCOSE SERPL-MCNC: 108 MG/DL — HIGH (ref 70–99)
HCT VFR BLD CALC: 51.3 % — HIGH (ref 39–50)
HGB BLD-MCNC: 17.2 G/DL — HIGH (ref 13–17)
IMM GRANULOCYTES NFR BLD AUTO: 0.4 % — SIGNIFICANT CHANGE UP (ref 0–1.5)
INR BLD: 1.16 RATIO — SIGNIFICANT CHANGE UP (ref 0.88–1.16)
LYMPHOCYTES # BLD AUTO: 2.66 K/UL — SIGNIFICANT CHANGE UP (ref 1–3.3)
LYMPHOCYTES # BLD AUTO: 27.2 % — SIGNIFICANT CHANGE UP (ref 13–44)
MAGNESIUM SERPL-MCNC: 2 MG/DL — SIGNIFICANT CHANGE UP (ref 1.8–2.6)
MCHC RBC-ENTMCNC: 29.9 PG — SIGNIFICANT CHANGE UP (ref 27–34)
MCHC RBC-ENTMCNC: 33.5 GM/DL — SIGNIFICANT CHANGE UP (ref 32–36)
MCV RBC AUTO: 89.1 FL — SIGNIFICANT CHANGE UP (ref 80–100)
MONOCYTES # BLD AUTO: 1.09 K/UL — HIGH (ref 0–0.9)
MONOCYTES NFR BLD AUTO: 11.1 % — SIGNIFICANT CHANGE UP (ref 2–14)
NEUTROPHILS # BLD AUTO: 5.35 K/UL — SIGNIFICANT CHANGE UP (ref 1.8–7.4)
NEUTROPHILS NFR BLD AUTO: 54.8 % — SIGNIFICANT CHANGE UP (ref 43–77)
NT-PROBNP SERPL-SCNC: 127 PG/ML — SIGNIFICANT CHANGE UP (ref 0–300)
PLATELET # BLD AUTO: 286 K/UL — SIGNIFICANT CHANGE UP (ref 150–400)
POTASSIUM SERPL-MCNC: 4.4 MMOL/L — SIGNIFICANT CHANGE UP (ref 3.5–5.3)
POTASSIUM SERPL-SCNC: 4.4 MMOL/L — SIGNIFICANT CHANGE UP (ref 3.5–5.3)
PROT SERPL-MCNC: 7.7 G/DL — SIGNIFICANT CHANGE UP (ref 6.6–8.7)
PROTHROM AB SERPL-ACNC: 13.5 SEC — HIGH (ref 10.5–13.4)
RBC # BLD: 5.76 M/UL — SIGNIFICANT CHANGE UP (ref 4.2–5.8)
RBC # FLD: 13.2 % — SIGNIFICANT CHANGE UP (ref 10.3–14.5)
RSV RNA NPH QL NAA+NON-PROBE: SIGNIFICANT CHANGE UP
SARS-COV-2 RNA SPEC QL NAA+PROBE: SIGNIFICANT CHANGE UP
SODIUM SERPL-SCNC: 142 MMOL/L — SIGNIFICANT CHANGE UP (ref 135–145)
TROPONIN T SERPL-MCNC: <0.01 NG/ML — SIGNIFICANT CHANGE UP (ref 0–0.06)
WBC # BLD: 9.78 K/UL — SIGNIFICANT CHANGE UP (ref 3.8–10.5)
WBC # FLD AUTO: 9.78 K/UL — SIGNIFICANT CHANGE UP (ref 3.8–10.5)

## 2022-05-23 PROCEDURE — 99223 1ST HOSP IP/OBS HIGH 75: CPT

## 2022-05-23 PROCEDURE — 99291 CRITICAL CARE FIRST HOUR: CPT

## 2022-05-23 PROCEDURE — 99292 CRITICAL CARE ADDL 30 MIN: CPT

## 2022-05-23 PROCEDURE — 93010 ELECTROCARDIOGRAM REPORT: CPT | Mod: 76

## 2022-05-23 PROCEDURE — 76705 ECHO EXAM OF ABDOMEN: CPT | Mod: 26

## 2022-05-23 PROCEDURE — 71045 X-RAY EXAM CHEST 1 VIEW: CPT | Mod: 26

## 2022-05-23 RX ORDER — VALSARTAN 80 MG/1
80 TABLET ORAL DAILY
Refills: 0 | Status: DISCONTINUED | OUTPATIENT
Start: 2022-05-23 | End: 2022-05-24

## 2022-05-23 RX ORDER — ONDANSETRON 8 MG/1
4 TABLET, FILM COATED ORAL EVERY 8 HOURS
Refills: 0 | Status: DISCONTINUED | OUTPATIENT
Start: 2022-05-23 | End: 2022-05-25

## 2022-05-23 RX ORDER — CLOPIDOGREL BISULFATE 75 MG/1
75 TABLET, FILM COATED ORAL DAILY
Refills: 0 | Status: DISCONTINUED | OUTPATIENT
Start: 2022-05-23 | End: 2022-05-25

## 2022-05-23 RX ORDER — GABAPENTIN 400 MG/1
300 CAPSULE ORAL AT BEDTIME
Refills: 0 | Status: DISCONTINUED | OUTPATIENT
Start: 2022-05-23 | End: 2022-05-25

## 2022-05-23 RX ORDER — TRAZODONE HCL 50 MG
200 TABLET ORAL AT BEDTIME
Refills: 0 | Status: DISCONTINUED | OUTPATIENT
Start: 2022-05-23 | End: 2022-05-23

## 2022-05-23 RX ORDER — LANOLIN ALCOHOL/MO/W.PET/CERES
5 CREAM (GRAM) TOPICAL AT BEDTIME
Refills: 0 | Status: DISCONTINUED | OUTPATIENT
Start: 2022-05-23 | End: 2022-05-25

## 2022-05-23 RX ORDER — TRAZODONE HCL 50 MG
100 TABLET ORAL AT BEDTIME
Refills: 0 | Status: DISCONTINUED | OUTPATIENT
Start: 2022-05-23 | End: 2022-05-25

## 2022-05-23 RX ORDER — LANOLIN ALCOHOL/MO/W.PET/CERES
3 CREAM (GRAM) TOPICAL AT BEDTIME
Refills: 0 | Status: DISCONTINUED | OUTPATIENT
Start: 2022-05-23 | End: 2022-05-23

## 2022-05-23 RX ORDER — ASPIRIN/CALCIUM CARB/MAGNESIUM 324 MG
81 TABLET ORAL DAILY
Refills: 0 | Status: DISCONTINUED | OUTPATIENT
Start: 2022-05-23 | End: 2022-05-25

## 2022-05-23 RX ORDER — METOPROLOL TARTRATE 50 MG
12.5 TABLET ORAL
Refills: 0 | Status: DISCONTINUED | OUTPATIENT
Start: 2022-05-23 | End: 2022-05-25

## 2022-05-23 RX ORDER — ADENOSINE 3 MG/ML
12 INJECTION INTRAVENOUS ONCE
Refills: 0 | Status: DISCONTINUED | OUTPATIENT
Start: 2022-05-23 | End: 2022-05-25

## 2022-05-23 RX ORDER — ACETAMINOPHEN 500 MG
650 TABLET ORAL EVERY 6 HOURS
Refills: 0 | Status: DISCONTINUED | OUTPATIENT
Start: 2022-05-23 | End: 2022-05-25

## 2022-05-23 RX ORDER — ADENOSINE 3 MG/ML
6 INJECTION INTRAVENOUS ONCE
Refills: 0 | Status: DISCONTINUED | OUTPATIENT
Start: 2022-05-23 | End: 2022-05-25

## 2022-05-23 RX ORDER — FLUOXETINE HCL 10 MG
40 CAPSULE ORAL DAILY
Refills: 0 | Status: DISCONTINUED | OUTPATIENT
Start: 2022-05-23 | End: 2022-05-25

## 2022-05-23 RX ADMIN — GABAPENTIN 300 MILLIGRAM(S): 400 CAPSULE ORAL at 23:11

## 2022-05-23 RX ADMIN — Medication 5 MILLIGRAM(S): at 23:11

## 2022-05-23 RX ADMIN — Medication 100 MILLIGRAM(S): at 23:11

## 2022-05-23 NOTE — ED ADULT NURSE NOTE - OBJECTIVE STATEMENT
Pt A&OX3, bib his daughter due to sob, no chest pain.  Pt arrived to ED with HR of 170's.  Pt states he recently had a cardiac stent placed.  Pt was given 6mg of Adenosine with no improvement, later given 12 mg, HR decreased to 90's.  No sob after converted to NSR.

## 2022-05-23 NOTE — PATIENT PROFILE ADULT - FALL HARM RISK - UNIVERSAL INTERVENTIONS
Bed in lowest position, wheels locked, appropriate side rails in place/Call bell, personal items and telephone in reach/Instruct patient to call for assistance before getting out of bed or chair/Non-slip footwear when patient is out of bed/[place]o to call system/Physically safe environment - no spills, clutter or unnecessary equipment/Purposeful Proactive Rounding/Room/bathroom lighting operational, light cord in reach

## 2022-05-23 NOTE — ED PROVIDER NOTE - OBJECTIVE STATEMENT
60 yo male with hx of CAD s/p stent, HTN, HLD presents to the ED for palpitations. Patient states that about 1hPTA he began feeling chest pain, palpitations, SOB. patient has felt these sxs before but never this bad. ON arrival noted to have HR of 173 in SVT.

## 2022-05-23 NOTE — ED PROVIDER NOTE - CLINICAL SUMMARY MEDICAL DECISION MAKING FREE TEXT BOX
SVT to rate of 173, placed on monitor and pads. Given 6 mg and then 12 of adenosine with lowered HR. Now in NSR in 70s. Other vitals stable. Will order labs, consult cardiology.

## 2022-05-23 NOTE — CONSULT NOTE ADULT - SUBJECTIVE AND OBJECTIVE BOX
[facility], [facility]                                   [address] [place] [address]                                                      PHONE: [phone]                                                         FAX: [phone]  [email or web address]  ---------------------------------------------------------------------------------------------------------------------------------    Reason for Consult: SVT    HPI:  KIMMY GRANT is an 61y Male with hx CAD, s/p PCI to LAD 2/2022, HTN, hyperlipidemia, former smoker, presented with sudden onset chest tightness, dizziness, and sweating, began while "just standing there", was brought to ER by his daughter (who is a nurse here), noted to be in SVT with /min, given 12mg IV adenosine and rhythm terminated to sinus.  Feels well presently.  States he has had similar, but shorter duration, frequently since the PCI in February.  Never prior to PCI.  Meds same as pre-PCI except Toprol XL 12.5 mg was added at that time.  Was recently in our office to see Dr. Feliciano and endorsed daily nonexertional CP, and outpatient cath was recommended to assess stent patency.  Pt thinks today's symptoms were identical to those.  No F/C/cough/N/V. +chronic mild exertional dyspnea.    PAST MEDICAL & SURGICAL HISTORY:  Essential hypertension      High cholesterol      Chronic back pain      Diverticulitis      Bronchitis, asthmatic      H/O carpal tunnel repair      No significant past surgical history          latex (Rash)  No Known Drug Allergies      MEDICATIONS  (STANDING):ASA, plavix, Toprol XL 12.5 daily, Diovan 80 daily, lipitor    MEDICATIONS  (PRN):      Social History:  Cigarettes:  quit 35 y ago                  Alcohol: no                 Illicit Drug Abuse: no   Retired     ROS: Negative other than as mentioned in HPI.    Vital Signs Last 24 Hrs  T(C): --  T(F): --  HR: 85 (23 May 2022 19:52) (85 - 170)  BP: 158/94 (23 May 2022 19:52) (130/92 - 164/86)  BP(mean): --  RR: 16 (23 May 2022 19:52) (16 - 20)  SpO2: 98% (23 May 2022 19:52) (98% - 99%)  ICU Vital Signs Last 24 Hrs  KIMMY GRANT  I&O's Detail    I&O's Summary    Drug Dosing Weight  KIMMY GRANT      PHYSICAL EXAM:  General:  alert and cooperative.  HEENT: Head; normocephalic, atraumatic.  Eyes: Pupils reactive, cornea wnl.  Neck: Supple, no nodes adenopathy, no NVD or carotid bruit or thyromegaly.  CARDIOVASCULAR: Normal S1 and S2, No murmur, rub, gallop or lift.   LUNGS: No rales, rhonchi or wheeze. Normal breath sounds bilaterally.  ABDOMEN: Soft, nontender without mass or organomegaly. bowel sounds normoactive.  EXTREMITIES: No clubbing, cyanosis or edema. Distal pulses wnl.   SKIN: warm and dry with normal turgor.  NEURO: Alert/oriented x 3/normal motor exam. No pathologic reflexes.    PSYCH: normal affect.        LABS:                        17.2   9.78  )-----------( 286      ( 23 May 2022 19:13 )             51.3     05-23    142  |  103  |  24.4<H>  ----------------------------<  108<H>  4.4   |  26.0  |  1.08    Ca    10.0      23 May 2022 19:13  Mg     2.0     05-23    TPro  7.7  /  Alb  4.5  /  TBili  0.6  /  DBili  x   /  AST  95<H>  /  ALT  117<H>  /  AlkPhos  129<H>  05-23    KIMMY GRANT  CARDIAC MARKERS ( 23 May 2022 19:13 )  x     / <0.01 ng/mL / x     / x     / x          PT/INR - ( 23 May 2022 19:13 )   PT: 13.5 sec;   INR: 1.16 ratio         PTT - ( 23 May 2022 19:13 )  PTT:31.6 sec      RADIOLOGY & ADDITIONAL STUDIES:    INTERPRETATION OF TELEMETRY (personally reviewed):    ECG: initial- SVT, 170/min.  now- sinus rhythm, normal intervals, L axis deviation, no ischemic changes    ECHO: normal EF. mild aortic sclerosis 12/10/2021    CARDIAC CATHETERIZATION: 2/2022 80% prox LAD (PCI was done), 30% distal LM, 30% PDA    Assessment and Plan:  In summary, KIMMY GRANT is an 61y Male with past medical history significant for recent PCI to LAD, presented with very symptomatic pSVT as above, terminated with adenosine, has had multiple similar episodes since PCI but none prior.  Outpatient cath already planned as above due to CP.  No evidence MI here.  No CHF.  Likely mildly volume depleted.  Would hydrate with IV NS.  Cath tomorrow to eval CP as above.  NPO p MN.  Continue ASA, plavix, Topol, Diovan and lipitor.  Ultimately recommend EPS and ablation for treatment of highly symptomatic SVT, option of med therapy discussed as well,  but given severe symptoms and the fact that he runs borderline hypotensive at home on current dose Toprol, doubt med uptitration will be effective.  Discussed at length with patient and his daughter, he states "needs to get his mind in the right place" prior to proceeding with EPS.  Tele overnight.  Liver enzyme elevation of unclear significance/etiology, would repeat.  We will follow and cath tomorrow as above.

## 2022-05-23 NOTE — ED PROVIDER NOTE - ATTENDING CONTRIBUTION TO CARE
The patient seen and examined    SVT    HERBIE, Eder Linares, performed the initial face to face bedside interview with this patient regarding history of present illness, review of symptoms and relevant past medical, social and family history.  I completed an independent physical examination.  I was the initial provider who evaluated this patient. I have signed out the follow up of any pending tests (i.e. labs, radiological studies) to the resident  I have communicated the patient’s plan of care and disposition with the resident.

## 2022-05-23 NOTE — ED ADULT NURSE NOTE - NSFALLRSKHARMRISK_ED_ALL_ED
Fever in Children    WHAT YOU NEED TO KNOW:    A fever is an increase in your child's body temperature. Normal body temperature is 98.6°F (37°C). Fever is generally defined as greater than 100.4°F (38°C). A fever is usually a sign that your child's body is fighting an infection caused by a virus. The cause of your child's fever may not be known. A fever can be serious in young children.    DISCHARGE INSTRUCTIONS:    Seek care immediately if:    Your child's temperature reaches 105°F (40.6°C).    Your child has a dry mouth, cracked lips, or cries without tears.     Your baby has a dry diaper for at least 8 hours, or he or she is urinating less than usual.    Your child is less alert, less active, or is acting differently than he or she usually does.    Your child has a seizure or has abnormal movements of the face, arms, or legs.    Your child is drooling and not able to swallow.    Your child has a stiff neck, severe headache, confusion, or is difficult to wake.    Your child has a fever for longer than 5 days.    Your child is crying or irritable and cannot be soothed.    Contact your child's healthcare provider if:    Your child's ear or forehead temperature is higher than 100.4°F (38°C).    Your child's oral or pacifier temperature is higher than 100°F (37.8°C).    Your child's armpit temperature is higher than 99°F (37.2°C).    Your child's fever lasts longer than 3 days.    You have questions or concerns about your child's fever.    Medicines: Your child may need any of the following:    Acetaminophen decreases pain and fever. It is available without a doctor's order. Ask how much to give your child and how often to give it. Follow directions. Read the labels of all other medicines your child uses to see if they also contain acetaminophen, or ask your child's doctor or pharmacist. Acetaminophen can cause liver damage if not taken correctly.    NSAIDs, such as ibuprofen, help decrease swelling, pain, and fever. This medicine is available with or without a doctor's order. NSAIDs can cause stomach bleeding or kidney problems in certain people. If your child takes blood thinner medicine, always ask if NSAIDs are safe for him. Always read the medicine label and follow directions. Do not give these medicines to children under 6 months of age without direction from your child's healthcare provider.    Do not give aspirin to children under 18 years of age. Your child could develop Reye syndrome if he takes aspirin. Reye syndrome can cause life-threatening brain and liver damage. Check your child's medicine labels for aspirin, salicylates, or oil of wintergreen.    Give your child's medicine as directed. Contact your child's healthcare provider if you think the medicine is not working as expected. Tell him or her if your child is allergic to any medicine. Keep a current list of the medicines, vitamins, and herbs your child takes. Include the amounts, and when, how, and why they are taken. Bring the list or the medicines in their containers to follow-up visits. Carry your child's medicine list with you in case of an emergency.    Temperature that is a fever in children:    An ear or forehead temperature of 100.4°F (38°C) or higher    An oral or pacifier temperature of 100°F (37.8°C) or higher    An armpit temperature of 99°F (37.2°C) or higher    The best way to take your child's temperature: The following are guidelines based on a child's age. Ask your child's healthcare provider about the best way to take your child's temperature.    If your baby is 3 months or younger, take the temperature in his or her armpit.    If your child is 3 months to 5 years, use an electronic pacifier temperature, depending on his or her age. After age 6 months, you can also take an ear, armpit, or forehead temperature.    If your child is 5 years or older, take an oral, ear, or forehead temperature.    Make your child more comfortable while he or she has a fever:    Give your child more liquids as directed. A fever makes your child sweat. This can increase his or her risk for dehydration. Liquids can help prevent dehydration.  Help your child drink at least 6 to 8 eight-ounce cups of clear liquids each day. Give your child water, juice, or broth. Do not give sports drinks to babies or toddlers.    Ask your child's healthcare provider if you should give your child an oral rehydration solution (ORS) to drink. An ORS has the right amounts of water, salts, and sugar your child needs to replace body fluids.    If you are breastfeeding or feeding your child formula, continue to do so. Your baby may not feel like drinking his or her regular amounts with each feeding. If so, feed him or her smaller amounts more often.    Dress your child in lightweight clothes. Shivers may be a sign that your child's fever is rising. Do not put extra blankets or clothes on him or her. This may cause his or her fever to rise even higher. Dress your child in light, comfortable clothing. Cover him or her with a lightweight blanket or sheet. Change your child's clothes, blanket, or sheets if they get wet.    Cool your child safely. Use a cool compress or give your child a bath in cool or lukewarm water. Your child's fever may not go down right away after his or her bath. Wait 30 minutes and check his or her temperature again. Do not put your child in a cold water or ice bath.    Follow up with your child's healthcare provider as directed: Write down your questions so you remember to ask them during your child's visits.       Upper Respiratory Infection in Children    AMBULATORY CARE:    An upper respiratory infection is also called a common cold. It can affect your child's nose, throat, ears, and sinuses. Most children get about 5 to 8 colds each year.     Common signs and symptoms include the following: Your child's cold symptoms will be worst for the first 3 to 5 days. Your child may have any of the following:     Runny or stuffy nose      Sneezing and coughing    Sore throat or hoarseness    Red, watery, and sore eyes    Tiredness or fussiness    Chills and a fever that usually lasts 1 to 3 days    Headache, body aches, or sore muscles    Seek care immediately if:     Your child's temperature reaches 105°F (40.6°C).      Your child has trouble breathing or is breathing faster than usual.       Your child's lips or nails turn blue.       Your child's nostrils flare when he or she takes a breath.       The skin above or below your child's ribs is sucked in with each breath.       Your child's heart is beating much faster than usual.       You see pinpoint or larger reddish-purple dots on your child's skin.       Your child stops urinating or urinates less than usual.       Your baby's soft spot on his or her head is bulging outward or sunken inward.       Your child has a severe headache or stiff neck.       Your child has chest or stomach pain.       Your baby is too weak to eat.     Contact your child's healthcare provider if:     Your child has a rectal, ear, or forehead temperature higher than 100.4°F (38°C).       Your child has an oral or pacifier temperature higher than 100°F (37.8°C).      Your child has an armpit temperature higher than 99°F (37.2°C).      Your child is younger than 2 years and has a fever for more than 24 hours.       Your child is 2 years or older and has a fever for more than 72 hours.       Your child has had thick nasal drainage for more than 2 days.       Your child has ear pain.       Your child has white spots on his or her tonsils.       Your child coughs up a lot of thick, yellow, or green mucus.       Your child is unable to eat, has nausea, or is vomiting.       Your child has increased tiredness and weakness.      Your child's symptoms do not improve or get worse within 3 days.       You have questions or concerns about your child's condition or care.    Treatment for your child's cold: There is no cure for the common cold. Colds are caused by viruses and do not get better with antibiotics. Most colds in children go away without treatment in 1 to 2 weeks. Do not give over-the-counter (OTC) cough or cold medicines to children younger than 4 years. Your child's healthcare provider may tell you not to give these medicines to children younger than 6 years. OTC cough and cold medicines can cause side effects that may harm your child. Your child may need any of the following to help manage his or her symptoms:     Over the counter Cough suppressants and Decongestants have not been shown to be effective in children. please consult with your physician before giving them to your child.    Acetaminophen decreases pain and fever. It is available without a doctor's order. Ask how much to give your child and how often to give it. Follow directions. Read the labels of all other medicines your child uses to see if they also contain acetaminophen, or ask your child's doctor or pharmacist. Acetaminophen can cause liver damage if not taken correctly.    NSAIDs, such as ibuprofen, help decrease swelling, pain, and fever. This medicine is available with or without a doctor's order. NSAIDs can cause stomach bleeding or kidney problems in certain people. If your child takes blood thinner medicine, always ask if NSAIDs are safe for him. Always read the medicine label and follow directions. Do not give these medicines to children under 6 months of age without direction from your child's healthcare provider.    Do not give aspirin to children under 18 years of age. Your child could develop Reye syndrome if he takes aspirin. Reye syndrome can cause life-threatening brain and liver damage. Check your child's medicine labels for aspirin, salicylates, or oil of wintergreen.       Give your child's medicine as directed. Contact your child's healthcare provider if you think the medicine is not working as expected. Tell him or her if your child is allergic to any medicine. Keep a current list of the medicines, vitamins, and herbs your child takes. Include the amounts, and when, how, and why they are taken. Bring the list or the medicines in their containers to follow-up visits. Carry your child's medicine list with you in case of an emergency.    Care for your child:     Have your child rest. Rest will help his or her body get better.     Give your child more liquids as directed. Liquids will help thin and loosen mucus so your child can cough it up. Liquids will also help prevent dehydration. Liquids that help prevent dehydration include water, fruit juice, and broth. Do not give your child liquids that contain caffeine. Caffeine can increase your child's risk for dehydration. Ask your child's healthcare provider how much liquid to give your child each day.     Clear mucus from your child's nose. Use a bulb syringe to remove mucus from a baby's nose. Squeeze the bulb and put the tip into one of your baby's nostrils. Gently close the other nostril with your finger. Slowly release the bulb to suck up the mucus. Empty the bulb syringe onto a tissue. Repeat the steps if needed. Do the same thing in the other nostril. Make sure your baby's nose is clear before he or she feeds or sleeps. Your child's healthcare provider may recommend you put saline drops into your baby's nose if the mucus is very thick.     Soothe your child's throat. If your child is 8 years or older, have him or her gargle with salt water. Make salt water by dissolving ¼ teaspoon salt in 1 cup warm water.     Soothe your child's cough. You can give honey to children older than 1 year. Give ½ teaspoon of honey to children 1 to 5 years. Give 1 teaspoon of honey to children 6 to 11 years. Give 2 teaspoons of honey to children 12 or older.    Use a cool-mist humidifier. This will add moisture to the air and help your child breathe easier. Make sure the humidifier is out of your child's reach.    Apply petroleum-based jelly around the outside of your child's nostrils. This can decrease irritation from blowing his or her nose.     Keep your child away from smoke. Do not smoke near your child. Do not let your older child smoke. Nicotine and other chemicals in cigarettes and cigars can make your child's symptoms worse. They can also cause infections such as bronchitis or pneumonia. Ask your child's healthcare provider for information if you or your child currently smoke and need help to quit. E-cigarettes or smokeless tobacco still contain nicotine. Talk to your healthcare provider before you or your child use these products.     Prevent the spread of a cold:     Keep your child away from other people during the first 3 to 5 days of his or her cold. The virus is spread most easily during this time.     Wash your hands and your child's hands often. Teach your child to cover his or her nose and mouth when he or she sneezes, coughs, and blows his or her nose. Show your child how to cough and sneeze into the crook of the elbow instead of the hands.      Do not let your child share toys, pacifiers, or towels with others while he or she is sick.     Do not let your child share foods, eating utensils, cups, or drinks with others while he or she is sick.    Follow up with your child's healthcare provider as directed: Write down your questions so you remember to ask them during your child's visits. no

## 2022-05-24 LAB
ANION GAP SERPL CALC-SCNC: 12 MMOL/L — SIGNIFICANT CHANGE UP (ref 5–17)
BUN SERPL-MCNC: 21.4 MG/DL — HIGH (ref 8–20)
CALCIUM SERPL-MCNC: 9 MG/DL — SIGNIFICANT CHANGE UP (ref 8.6–10.2)
CHLORIDE SERPL-SCNC: 104 MMOL/L — SIGNIFICANT CHANGE UP (ref 98–107)
CO2 SERPL-SCNC: 26 MMOL/L — SIGNIFICANT CHANGE UP (ref 22–29)
CREAT SERPL-MCNC: 1.13 MG/DL — SIGNIFICANT CHANGE UP (ref 0.5–1.3)
EGFR: 73 ML/MIN/1.73M2 — SIGNIFICANT CHANGE UP
GLUCOSE SERPL-MCNC: 107 MG/DL — HIGH (ref 70–99)
HAV IGM SER-ACNC: SIGNIFICANT CHANGE UP
HBV CORE IGM SER-ACNC: SIGNIFICANT CHANGE UP
HBV SURFACE AG SER-ACNC: SIGNIFICANT CHANGE UP
HCT VFR BLD CALC: 45.8 % — SIGNIFICANT CHANGE UP (ref 39–50)
HCV AB S/CO SERPL IA: 0.17 S/CO — SIGNIFICANT CHANGE UP (ref 0–0.99)
HCV AB SERPL-IMP: SIGNIFICANT CHANGE UP
HGB BLD-MCNC: 15.4 G/DL — SIGNIFICANT CHANGE UP (ref 13–17)
MAGNESIUM SERPL-MCNC: 2.1 MG/DL — SIGNIFICANT CHANGE UP (ref 1.6–2.6)
MCHC RBC-ENTMCNC: 30.4 PG — SIGNIFICANT CHANGE UP (ref 27–34)
MCHC RBC-ENTMCNC: 33.6 GM/DL — SIGNIFICANT CHANGE UP (ref 32–36)
MCV RBC AUTO: 90.3 FL — SIGNIFICANT CHANGE UP (ref 80–100)
PHOSPHATE SERPL-MCNC: 3.6 MG/DL — SIGNIFICANT CHANGE UP (ref 2.4–4.7)
PLATELET # BLD AUTO: 193 K/UL — SIGNIFICANT CHANGE UP (ref 150–400)
POTASSIUM SERPL-MCNC: 4.1 MMOL/L — SIGNIFICANT CHANGE UP (ref 3.5–5.3)
POTASSIUM SERPL-SCNC: 4.1 MMOL/L — SIGNIFICANT CHANGE UP (ref 3.5–5.3)
RBC # BLD: 5.07 M/UL — SIGNIFICANT CHANGE UP (ref 4.2–5.8)
RBC # FLD: 13.2 % — SIGNIFICANT CHANGE UP (ref 10.3–14.5)
SODIUM SERPL-SCNC: 142 MMOL/L — SIGNIFICANT CHANGE UP (ref 135–145)
T4 FREE SERPL-MCNC: 1.2 NG/DL — SIGNIFICANT CHANGE UP (ref 0.9–1.8)
WBC # BLD: 6.4 K/UL — SIGNIFICANT CHANGE UP (ref 3.8–10.5)
WBC # FLD AUTO: 6.4 K/UL — SIGNIFICANT CHANGE UP (ref 3.8–10.5)

## 2022-05-24 PROCEDURE — 99232 SBSQ HOSP IP/OBS MODERATE 35: CPT

## 2022-05-24 RX ORDER — VALSARTAN 80 MG/1
160 TABLET ORAL DAILY
Refills: 0 | Status: DISCONTINUED | OUTPATIENT
Start: 2022-05-25 | End: 2022-05-25

## 2022-05-24 RX ORDER — TRAZODONE HCL 50 MG
2 TABLET ORAL
Qty: 0 | Refills: 0 | DISCHARGE

## 2022-05-24 RX ORDER — VALSARTAN 80 MG/1
40 TABLET ORAL ONCE
Refills: 0 | Status: COMPLETED | OUTPATIENT
Start: 2022-05-24 | End: 2022-05-24

## 2022-05-24 RX ADMIN — Medication 12.5 MILLIGRAM(S): at 05:28

## 2022-05-24 RX ADMIN — Medication 100 MILLIGRAM(S): at 22:58

## 2022-05-24 RX ADMIN — Medication 81 MILLIGRAM(S): at 13:09

## 2022-05-24 RX ADMIN — GABAPENTIN 300 MILLIGRAM(S): 400 CAPSULE ORAL at 22:57

## 2022-05-24 RX ADMIN — Medication 5 MILLIGRAM(S): at 22:57

## 2022-05-24 RX ADMIN — CLOPIDOGREL BISULFATE 75 MILLIGRAM(S): 75 TABLET, FILM COATED ORAL at 13:10

## 2022-05-24 RX ADMIN — VALSARTAN 40 MILLIGRAM(S): 80 TABLET ORAL at 20:43

## 2022-05-24 RX ADMIN — Medication 40 MILLIGRAM(S): at 13:10

## 2022-05-24 RX ADMIN — VALSARTAN 80 MILLIGRAM(S): 80 TABLET ORAL at 05:28

## 2022-05-24 NOTE — PROGRESS NOTE ADULT - SUBJECTIVE AND OBJECTIVE BOX
[facility], [facility]                                   [address] [address]                                                      PHONE: [phone]                                                         FAX: [phone]  [email or web address]  ---------------------------------------------------------------------------------------------------------------------------------    Please see cath report.    Prior LAD stent patent with non obstructive CAD.   Med tx.  OK to dc home with out pt follow up with EP      Marcus Linares MD    Office [phone]  Cell     [phone]

## 2022-05-24 NOTE — H&P ADULT - ASSESSMENT
62 y/o male with PSVT, elevated LFTs, CAD s/p PCI in 2/22, HTN, HLD, Insomnia     PSVT:  -Currently in NSR, NO CP, no evidence of ACS  -TSH elevated, check FT4  -Repeat lytes in AM  -Cont. BB  -Cont. Tele monitor  -OOB, ambulate    CAD:  -NPO for LHC in AM per Cardiology consult  -Cont. o/p regimen    Elevated LFTs:  -Check RUQ sono  -Check viral hep profile  -Statin?, Fluoxetine?     HTN:  -Cont. o/p regimen  -DASH diet    Insomnia:  -Cont. trazodone/melatonin     Discussed with Patient who agrees with above plan of care.

## 2022-05-24 NOTE — PROGRESS NOTE ADULT - SUBJECTIVE AND OBJECTIVE BOX
[facility], [facility]                              [address] [address]                                                 PHONE: [phone]                                                 FAX: [phone]  -----------------------------------------------------------------------------------------------  Pt seen and examined. FU for CAD    Overnight events/Complaints: Pt with no further episodes of palpitations. No further chest pain.    Vital Signs Last 24 Hrs  T(C): 36.6 (24 May 2022 05:22), Max: 36.7 (24 May 2022 00:50)  T(F): 97.9 (24 May 2022 05:22), Max: 98 (24 May 2022 00:50)  HR: 66 (24 May 2022 05:22) (66 - 170)  BP: 148/88 (24 May 2022 05:22) (130/92 - 164/86)  BP(mean): 113 (24 May 2022 00:50) (113 - 113)  RR: 16 (24 May 2022 05:22) (16 - 20)  SpO2: 98% (24 May 2022 05:22) (98% - 99%)  I&O's Summary    23 May 2022 07:01  -  24 May 2022 07:00  --------------------------------------------------------  IN: 180 mL / OUT: 0 mL / NET: 180 mL        PHYSICAL EXAM:  Constitutional: Appears well; alert and co-operative  HEENT:     Head: Normocephalic and atraumatic  Neck: supple. No JVD  Cardiovascular: regular S1 S2  Respiratory: Lungs clear to auscultation; no crepitations, no wheeze  Gastrointestinal:  Soft, Non-tender, + BS	  Musculoskeletal: Normal range of motion. No edema  Skin: Warm and dry. No cyanosis . No diaphoresis.  Neurologic: Alert oriented to time place and person. Normal strength and no tremor.        LABS:                        15.4   6.40  )-----------( 193      ( 24 May 2022 06:05 )             45.8     05-24    142  |  104  |  21.4<H>  ----------------------------<  107<H>  4.1   |  26.0  |  1.13    Ca    9.0      24 May 2022 06:05  Phos  3.6     05-24  Mg     2.1     05-24    TPro  7.7  /  Alb  4.5  /  TBili  0.6  /  DBili  x   /  AST  95<H>  /  ALT  117<H>  /  AlkPhos  129<H>  05-23    CARDIAC MARKERS ( 23 May 2022 19:13 )  x     / <0.01 ng/mL / x     / x     / x          PT/INR - ( 23 May 2022 19:13 )   PT: 13.5 sec;   INR: 1.16 ratio         PTT - ( 23 May 2022 19:13 )  PTT:31.6 sec    CARDIOLOGY TESTING:(reviewed)     12 lead EKG independently visualized/reviewed  and demonstrated  initial- SVT, 170/min.  now- sinus rhythm, normal intervals, L axis deviation, no ischemic changes    ECHO: normal EF. mild aortic sclerosis 12/10/2021    CARDIAC CATHETERIZATION: 2/2022 80% prox LAD (PCI was done), 30% distal LM, 30% PDA    TELEMETRY independently visualized/reviewed and demonstrated : NSR [70-80 ]  with no arrhythmias;     MEDICATIONS:(reviewed)  MEDICATIONS  (STANDING):  aDENosine Injectable (ADENOCARD) 6 milliGRAM(s) IV Push once  aDENosine Injectable (ADENOCARD) 12 milliGRAM(s) IV Push once  aspirin enteric coated 81 milliGRAM(s) Oral daily  clopidogrel Tablet 75 milliGRAM(s) Oral daily  FLUoxetine 40 milliGRAM(s) Oral daily  gabapentin 300 milliGRAM(s) Oral at bedtime  melatonin 5 milliGRAM(s) Oral at bedtime  metoprolol tartrate 12.5 milliGRAM(s) Oral two times a day  traZODone 100 milliGRAM(s) Oral at bedtime  valsartan 80 milliGRAM(s) Oral daily      ASSESSMENT AND PLAN:    61y Male with past medical history significant for recent PCI to LAD, presented with very symptomatic pSVT as above, terminated with adenosine, has had multiple similar episodes since PCI but none prior.     No evidence MI here.    Cath today  Continue ASA, plavix, Topol, Diovan and lipitor.    Ultimately recommend EPS and ablation for treatment of highly symptomatic SVT    Plan discussed with Medicine team

## 2022-05-24 NOTE — PROGRESS NOTE ADULT - SUBJECTIVE AND OBJECTIVE BOX
Pre Procedure Cardiac Cath NP Note      Narrative:      61y Male with hx CAD, s/p PCI to LAD 2022, HTN, hyperlipidemia, former smoker, presented with sudden onset chest tightness, dizziness, and sweating, began while "just standing there", was brought to ER by his daughter (who is a nurse here), noted to be in SVT with /min, given 12mg IV adenosine and rhythm terminated to sinus.  Feels well presently.  States he has had similar, but shorter duration, frequently since the PCI in February.  Never prior to PCI.  Meds same as pre-PCI except Toprol XL 12.5 mg was added at that time.  Was recently in our office to see Dr. Feliciano and endorsed daily nonexertional CP, and outpatient cath was recommended to assess stent patency.  Pt thinks today's symptoms were identical to those.  No F/C/cough/N/V. +chronic mild exertional dyspnea.    Above HPI noted:   Patient seen in cath holding, in NAD, hemodynamically stable. Denies chest pain, sob, palpitations. Patient has been experiencing episodes of SVT, is being followed by cardiology who is recommending EPS and possible SVT Ablation. He now presents to cath holding to evaluate progression of CAD.         ASA:     2  Mallampati: 2    Bleeding Risk:  1.1%   Creatinine:   1.13   GFR:   73     	  MEDICATIONS:  aDENosine Injectable (ADENOCARD) 6 milliGRAM(s) IV Push once  aDENosine Injectable (ADENOCARD) 12 milliGRAM(s) IV Push once  metoprolol tartrate 12.5 milliGRAM(s) Oral two times a day  valsartan 80 milliGRAM(s) Oral daily  acetaminophen     Tablet .. 650 milliGRAM(s) Oral every 6 hours PRN  FLUoxetine 40 milliGRAM(s) Oral daily  gabapentin 300 milliGRAM(s) Oral at bedtime  melatonin 5 milliGRAM(s) Oral at bedtime  ondansetron Injectable 4 milliGRAM(s) IV Push every 8 hours PRN  traZODone 100 milliGRAM(s) Oral at bedtime  aluminum hydroxide/magnesium hydroxide/simethicone Suspension 30 milliLiter(s) Oral every 4 hours PRN  aspirin enteric coated 81 milliGRAM(s) Oral daily  clopidogrel Tablet 75 milliGRAM(s) Oral daily                 15.4   6.40  )-----------( 193      ( 24 May 2022 06:05 )             45.8         142  |  104  |  21.4<H>  ----------------------------<  107<H>  4.1   |  26.0  |  1.13    Ca    9.0      24 May 2022 06:05  Phos  3.6       Mg     2.1         TPro  7.7  /  Alb  4.5  /  TBili  0.6  /  DBili  x   /  AST  95<H>  /  ALT  117<H>  /  AlkPhos  129<H>      proBNP: Serum Pro-Brain Natriuretic Peptide: 127 pg/mL ( @ 19:13)    Lipid Profile:   HgA1c:   TSH: Thyroid Stimulating Hormone, Serum: 7.75 uIU/mL ( @ 19:13)      PHYSICAL EXAM:    T(C): 36.4 (22 @ 18:00), Max: 37.1 (22 @ 10:00)  HR: 59 (22 @ 18:00) (59 - 170)  BP: 175/85 (22 @ 18:00) (124/75 - 175/85)  RR: 18 (22 @ 18:00) (16 - 20)  SpO2: 98% (22 @ 18:00) (92% - 99%)  Wt(kg): --    I&O's Summary    23 May 2022 07:01  -  24 May 2022 07:00  --------------------------------------------------------  IN: 180 mL / OUT: 0 mL / NET: 180 mL        Daily Height in cm: 170.18 (23 May 2022 18:52)    Daily Weight in k.4 (24 May 2022 04:06)    Constitutional: A & O x 3  HEENT:   Normal oral mucosa, PERRL, EOMI	  Cardiovascular: Normal S1 S2, No JVD, No murmurs, No edema  Respiratory: Lungs clear to auscultation	  Gastrointestinal:  Soft, Non-tender, + BS	  Skin: No rashes, No ecchymoses, No cyanosis  Neurologic: Non-focal  Extremities: Normal range of motion, No clubbing, cyanosis or edema  Vascular: Peripheral pulses palpable 2+ bilaterally    TELEMETRY: 	    ECG:  	  RADIOLOGY:   DIAGNOSTIC TESTING:  [X ] Echocardiogram:  < from: TTE Echo Limited or F/U (17 @ 09:04) >     Summary:   1. Left ventricular ejection fraction, by visual estimation, is 70 to   75%.   2. Normal global left ventricular systolic function.   3. Normal left ventricular internal cavity size.   4. Spectral Doppler shows pseudonormal pattern of left ventricular   myocardial filling (Grade II diastolic dysfunction).   5. There is no evidence of pericardial effusion.   6. Trace mitral valve regurgitation.   7. Endocardial visualization was enhanced with intravenous echo contrast.     MD Wyatt Electronically signed on 2017 at 11:00:51 AM                *** Final ***          < end of copied text >      [ ]  Catheterization:  [ ] Stress Test:    OTHER: 	    LABS:	 	    CARDIAC MARKERS:                         ASSESSMENT:      -consent to be obtained  -procedure discussed with patient; risks and benefits explained, questions answered  -labs and ECG reviewed                                                    Pre Procedure Cardiac Cath NP Note      Narrative:      61y Male with hx CAD, s/p PCI to LAD 2022, HTN, hyperlipidemia, former smoker, presented with sudden onset chest tightness, dizziness, and sweating, began while "just standing there", was brought to ER by his daughter (who is a nurse here), noted to be in SVT with /min, given 12mg IV adenosine and rhythm terminated to sinus.  Feels well presently.  States he has had similar, but shorter duration, frequently since the PCI in February.  Never prior to PCI.  Meds same as pre-PCI except Toprol XL 12.5 mg was added at that time.  Was recently in our office to see Dr. Feliciano and endorsed daily nonexertional CP, and outpatient cath was recommended to assess stent patency.  Pt thinks today's symptoms were identical to those.  No F/C/cough/N/V. +chronic mild exertional dyspnea.    Above HPI noted:   Patient seen in cath holding, in NAD, hemodynamically stable. Denies chest pain, sob, palpitations. Patient has been experiencing episodes of SVT, is being followed by cardiology who is recommending EPS and possible SVT Ablation. He now presents to cath holding to evaluate progression of CAD.         ASA:     2  Mallampati: 2    Bleeding Risk:  1.1%   Creatinine:   1.13   GFR:   73     	  MEDICATIONS:  aDENosine Injectable (ADENOCARD) 6 milliGRAM(s) IV Push once  aDENosine Injectable (ADENOCARD) 12 milliGRAM(s) IV Push once  metoprolol tartrate 12.5 milliGRAM(s) Oral two times a day  valsartan 80 milliGRAM(s) Oral daily  acetaminophen     Tablet .. 650 milliGRAM(s) Oral every 6 hours PRN  FLUoxetine 40 milliGRAM(s) Oral daily  gabapentin 300 milliGRAM(s) Oral at bedtime  melatonin 5 milliGRAM(s) Oral at bedtime  ondansetron Injectable 4 milliGRAM(s) IV Push every 8 hours PRN  traZODone 100 milliGRAM(s) Oral at bedtime  aluminum hydroxide/magnesium hydroxide/simethicone Suspension 30 milliLiter(s) Oral every 4 hours PRN  aspirin enteric coated 81 milliGRAM(s) Oral daily  clopidogrel Tablet 75 milliGRAM(s) Oral daily                 15.4   6.40  )-----------( 193      ( 24 May 2022 06:05 )             45.8         142  |  104  |  21.4<H>  ----------------------------<  107<H>  4.1   |  26.0  |  1.13    Ca    9.0      24 May 2022 06:05  Phos  3.6       Mg     2.1         TPro  7.7  /  Alb  4.5  /  TBili  0.6  /  DBili  x   /  AST  95<H>  /  ALT  117<H>  /  AlkPhos  129<H>      proBNP: Serum Pro-Brain Natriuretic Peptide: 127 pg/mL ( @ 19:13)    Lipid Profile:   HgA1c:   TSH: Thyroid Stimulating Hormone, Serum: 7.75 uIU/mL ( @ 19:13)      PHYSICAL EXAM:    T(C): 36.4 (22 @ 18:00), Max: 37.1 (22 @ 10:00)  HR: 59 (22 @ 18:00) (59 - 170)  BP: 175/85 (22 @ 18:00) (124/75 - 175/85)  RR: 18 (22 @ 18:00) (16 - 20)  SpO2: 98% (22 @ 18:00) (92% - 99%)  Wt(kg): --    I&O's Summary    23 May 2022 07:01  -  24 May 2022 07:00  --------------------------------------------------------  IN: 180 mL / OUT: 0 mL / NET: 180 mL        Daily Height in cm: 170.18 (23 May 2022 18:52)    Daily Weight in k.4 (24 May 2022 04:06)    Constitutional: A & O x 3  HEENT:   Normal oral mucosa, PERRL, EOMI	  Cardiovascular: Normal S1 S2, No JVD, No murmurs, No edema  Respiratory: Lungs clear to auscultation	  Gastrointestinal:  Soft, Non-tender, + BS	  Skin: No rashes, No ecchymoses, No cyanosis  Neurologic: Non-focal  Extremities: Normal range of motion, No clubbing, cyanosis or edema  Vascular: Peripheral pulses palpable 2+ bilaterally    TELEMETRY: 	    ECG:  	  RADIOLOGY:   DIAGNOSTIC TESTING:  [X ] Echocardiogram:  < from: TTE Echo Limited or F/U (17 @ 09:04) >     Summary:   1. Left ventricular ejection fraction, by visual estimation, is 70 to   75%.   2. Normal global left ventricular systolic function.   3. Normal left ventricular internal cavity size.   4. Spectral Doppler shows pseudonormal pattern of left ventricular   myocardial filling (Grade II diastolic dysfunction).   5. There is no evidence of pericardial effusion.   6. Trace mitral valve regurgitation.   7. Endocardial visualization was enhanced with intravenous echo contrast.     MD Wyatt Electronically signed on 2017 at 11:00:51 AM     *** Final ***    < end of copied text >      [ X  Catheterization:  < from: Cardiac Catheterization (22 @ 14:08) >  VENTRICLES: Global left ventricular function was normal. EF calculated by  contrast ventriculography was 55 %.  CORONARY VESSELS: The coronary circulation is left dominant.  LM:   --  Distal left main: There was a 30 % stenosis.  LAD:   --  Proximal LAD: There was a 80 % stenosis.  --  Distal LAD: Angiography showed minor luminal irregularities with no  flow limiting lesions.  CX:   --  Mid circumflex: Angiography showed minor luminal irregularities  with no flow limiting lesions.  --  LPDA: Therewas a 30 % stenosis.  RCA:   --  Proximal RCA: Normal.  --  Mid RCA: Normal.  COMPLICATIONS: There were no complications. No complications occurred  during the cath lab visit.  DIAGNOSTIC IMPRESSIONS: 80% proximal LAD stenosis s/p IVUS guided PTCA and  VENUS  DIAGNOSTIC RECOMMENDATIONS: ASA 81mg daily  Plavix 75 mg daily  Beta blocker  ace inhibitor  high dose statin  Lifestyle and risk factor modifications  INTERVENTIONAL IMPRESSIONS: 80% proximal LAD stenosis s/p IVUS guided PTCA  and VENUS  INTERVENTIONAL RECOMMENDATIONS: ASA 81mg daily  Plavix 75 mg daily  Beta blocker  ace inhibitor  high dose statin  Lifestyle and risk factor modifications  Prepared and signed by  Donny Cabezas MD  Signed 2022 15:36:10    < end of copied text >    [ ] Stress Test:    OTHER: 	    LABS:	       142  |  104  |  21.4<H>  ----------------------------<  107<H>  4.1   |  26.0  |  1.13    Ca    9.0      24 May 2022 06:05  Phos  3.6       Mg     2.1         TPro  7.7  /  Alb  4.5  /  TBili  0.6  /  DBili  x   /  AST  95<H>  /  ALT  117<H>  /  AlkPhos  129<H>           ASSESSMENT AND PLAN:     61y Male with hx CAD, s/p PCI to LAD 2022, HTN, hyperlipidemia, former smoker, presented with sudden onset chest tightness, dizziness, and sweating, began while "just standing there", was brought to ER by his daughter (who is a nurse here), noted to be in SVT with /min, given 12mg IV adenosine and rhythm terminated to sinus.  Feels well presently.  States he has had similar, but shorter duration, frequently since the PCI in February.  Never prior to PCI.  Meds same as pre-PCI except Toprol XL 12.5 mg was added at that time.  Was recently in our office to see Dr. Feliciano and endorsed daily nonexertional CP, and outpatient cath was recommended to assess stent patency.  Pt thinks today's symptoms were identical to those.  No F/C/cough/N/V. +chronic mild exertional dyspnea.    Above HPI noted:   Patient seen in cath holding, in NAD, hemodynamically stable. Denies chest pain, sob, palpitations. Patient has been experiencing episodes of SVT, is being followed by cardiology who is recommending EPS and possible SVT Ablation. He now presents to cath holding to evaluate progression of CAD.         -Patient seen and examined  -Labs and EKG reviewed   -Pre-procedure teaching completed with patient, questions answered   -Informed consent to be obtained by attending   -IVF  as per GFR  protocol;  ml x1 pre and post   -Pt received Asprin/ Plavix prior to cardiac cath   -pt instructed IF STENT PLACED WILL REQUIRE TO STAY OVERNIGHT FOR MONITORING AND DISCHARGED IN THE AM    -cardiac rehab info provided if stent is placed recommended to start if needed post PCI                                                       Pre Procedure Cardiac Cath NP Note      Narrative:      61y Male with hx CAD, s/p PCI to LAD 2022, HTN, hyperlipidemia, former smoker, presented with sudden onset chest tightness, dizziness, and sweating, began while "just standing there", was brought to ER by his daughter (who is a nurse here), noted to be in SVT with /min, given 12mg IV adenosine and rhythm terminated to sinus.  Feels well presently.  States he has had similar, but shorter duration, frequently since the PCI in February.  Never prior to PCI.  Meds same as pre-PCI except Toprol XL 12.5 mg was added at that time.  Was recently in our office to see Dr. Feliciano and endorsed daily nonexertional CP, and outpatient cath was recommended to assess stent patency.  Pt thinks today's symptoms were identical to those.  No F/C/cough/N/V. +chronic mild exertional dyspnea.    Above HPI noted:   Patient seen in cath holding, in NAD, hemodynamically stable. Denies chest pain, sob, palpitations. Patient has been experiencing episodes of SVT, is being followed by cardiology who is recommending EPS and possible SVT Ablation. He now presents to cath holding to evaluate progression of CAD.         ASA:     2  Mallampati: 2    Bleeding Risk:  1.1%   Creatinine:   1.13   GFR:   73     	  MEDICATIONS:  aDENosine Injectable (ADENOCARD) 6 milliGRAM(s) IV Push once  aDENosine Injectable (ADENOCARD) 12 milliGRAM(s) IV Push once  metoprolol tartrate 12.5 milliGRAM(s) Oral two times a day  valsartan 80 milliGRAM(s) Oral daily  acetaminophen     Tablet .. 650 milliGRAM(s) Oral every 6 hours PRN  FLUoxetine 40 milliGRAM(s) Oral daily  gabapentin 300 milliGRAM(s) Oral at bedtime  melatonin 5 milliGRAM(s) Oral at bedtime  ondansetron Injectable 4 milliGRAM(s) IV Push every 8 hours PRN  traZODone 100 milliGRAM(s) Oral at bedtime  aluminum hydroxide/magnesium hydroxide/simethicone Suspension 30 milliLiter(s) Oral every 4 hours PRN  aspirin enteric coated 81 milliGRAM(s) Oral daily  clopidogrel Tablet 75 milliGRAM(s) Oral daily                 15.4   6.40  )-----------( 193      ( 24 May 2022 06:05 )             45.8         142  |  104  |  21.4<H>  ----------------------------<  107<H>  4.1   |  26.0  |  1.13    Ca    9.0      24 May 2022 06:05  Phos  3.6       Mg     2.1         TPro  7.7  /  Alb  4.5  /  TBili  0.6  /  DBili  x   /  AST  95<H>  /  ALT  117<H>  /  AlkPhos  129<H>      proBNP: Serum Pro-Brain Natriuretic Peptide: 127 pg/mL ( @ 19:13)    Lipid Profile:   HgA1c:   TSH: Thyroid Stimulating Hormone, Serum: 7.75 uIU/mL ( @ 19:13)      PHYSICAL EXAM:    T(C): 36.4 (22 @ 18:00), Max: 37.1 (22 @ 10:00)  HR: 59 (22 @ 18:00) (59 - 170)  BP: 175/85 (22 @ 18:00) (124/75 - 175/85)  RR: 18 (22 @ 18:00) (16 - 20)  SpO2: 98% (22 @ 18:00) (92% - 99%)  Wt(kg): --    I&O's Summary    23 May 2022 07:01  -  24 May 2022 07:00  --------------------------------------------------------  IN: 180 mL / OUT: 0 mL / NET: 180 mL        Daily Height in cm: 170.18 (23 May 2022 18:52)    Daily Weight in k.4 (24 May 2022 04:06)    Constitutional: A & O x 3  HEENT:   Normal oral mucosa, PERRL, EOMI	  Cardiovascular: Normal S1 S2, No JVD, No murmurs, No edema  Respiratory: Lungs clear to auscultation	  Gastrointestinal:  Soft, Non-tender, + BS	  Skin: No rashes, No ecchymoses, No cyanosis  Neurologic: Non-focal  Extremities: Normal range of motion, No clubbing, cyanosis or edema  Vascular: Peripheral pulses palpable 2+ bilaterally    TELEMETRY: 	    ECG:  	  RADIOLOGY:   DIAGNOSTIC TESTING:  [X ] Echocardiogram:  < from: TTE Echo Limited or F/U (17 @ 09:04) >     Summary:   1. Left ventricular ejection fraction, by visual estimation, is 70 to   75%.   2. Normal global left ventricular systolic function.   3. Normal left ventricular internal cavity size.   4. Spectral Doppler shows pseudonormal pattern of left ventricular   myocardial filling (Grade II diastolic dysfunction).   5. There is no evidence of pericardial effusion.   6. Trace mitral valve regurgitation.   7. Endocardial visualization was enhanced with intravenous echo contrast.     MD Wyatt Electronically signed on 2017 at 11:00:51 AM     *** Final ***    < end of copied text >      [ X  Catheterization:  < from: Cardiac Catheterization (22 @ 14:08) >  VENTRICLES: Global left ventricular function was normal. EF calculated by  contrast ventriculography was 55 %.  CORONARY VESSELS: The coronary circulation is left dominant.  LM:   --  Distal left main: There was a 30 % stenosis.  LAD:   --  Proximal LAD: There was a 80 % stenosis.  --  Distal LAD: Angiography showed minor luminal irregularities with no  flow limiting lesions.  CX:   --  Mid circumflex: Angiography showed minor luminal irregularities  with no flow limiting lesions.  --  LPDA: Therewas a 30 % stenosis.  RCA:   --  Proximal RCA: Normal.  --  Mid RCA: Normal.  COMPLICATIONS: There were no complications. No complications occurred  during the cath lab visit.  DIAGNOSTIC IMPRESSIONS: 80% proximal LAD stenosis s/p IVUS guided PTCA and  VENUS  DIAGNOSTIC RECOMMENDATIONS: ASA 81mg daily  Plavix 75 mg daily  Beta blocker  ace inhibitor  high dose statin  Lifestyle and risk factor modifications  INTERVENTIONAL IMPRESSIONS: 80% proximal LAD stenosis s/p IVUS guided PTCA  and VENUS  INTERVENTIONAL RECOMMENDATIONS: ASA 81mg daily  Plavix 75 mg daily  Beta blocker  ace inhibitor  high dose statin  Lifestyle and risk factor modifications  Prepared and signed by  Donny Cabezas MD  Signed 2022 15:36:10    < end of copied text >    [ ] Stress Test:    OTHER: 	    LABS:	       142  |  104  |  21.4<H>  ----------------------------<  107<H>  4.1   |  26.0  |  1.13    Ca    9.0      24 May 2022 06:05  Phos  3.6       Mg     2.1         TPro  7.7  /  Alb  4.5  /  TBili  0.6  /  DBili  x   /  AST  95<H>  /  ALT  117<H>  /  AlkPhos  129<H>           ASSESSMENT AND PLAN:     61y Male with hx CAD, s/p PCI to LAD 2022, HTN, hyperlipidemia, former smoker, presented with sudden onset chest tightness, dizziness, and sweating, began while "just standing there", was brought to ER by his daughter (who is a nurse here), noted to be in SVT with /min, given 12mg IV adenosine and rhythm terminated to sinus.  Feels well presently.  States he has had similar, but shorter duration, frequently since the PCI in February. Patient has been experiencing episodes of SVT, is being followed by cardiology who is recommending EPS and possible SVT Ablation. He now presents to cath holding to evaluate progression of CAD.         -Patient seen and examined  -Labs and EKG reviewed   -Pre-procedure teaching completed with patient, questions answered   -Informed consent to be obtained by attending   -IVF  as per GFR  protocol;  ml x1 pre and post   -Pt received Asprin/ Plavix prior to cardiac cath   -pt instructed IF STENT PLACED WILL REQUIRE TO STAY OVERNIGHT FOR MONITORING AND DISCHARGED IN THE AM    -cardiac rehab info provided if stent is placed recommended to start if needed post PCI

## 2022-05-24 NOTE — H&P ADULT - HISTORY OF PRESENT ILLNESS
60 y/o male with hx of CAD s/p PCI to LAD in 2/22, HTN, HLD, who was recently seen by his Cardiologist and in process of being scheduled for repeat LHC due to intermittent dyspnea. He came to ED with palpitations, diaphoresis and found to be in SVT. Not hypotensive, trop neg. Given Adenosine with conversion to NSR. Denies any changes in medications, fever, chills, travel, or sick contacts. TSH nor suppressed, lytes WNL, no evidence of ACS/CHF. Seen by EP in ED and advised for admission for LHC and then EP study and  possible ablation. Currently with no complaints,

## 2022-05-24 NOTE — CHART NOTE - NSCHARTNOTEFT_GEN_A_CORE
60 y/o male with hx of CAD s/p PCI to LAD in 2/22, HTN, HLD, who was recently seen by his Cardiologist and in process of being scheduled for repeat LHC due to intermittent dyspnea. He came to ED with palpitations, diaphoresis and found to be in SVT. Not hypotensive, trop neg. Given Adenosine with conversion to NSR. Denies any changes in medications, fever, chills, travel, or sick contacts. TSH nor suppressed, lytes WNL, no evidence of ACS/CHF. Seen by EP in ED and advised for admission for LHC and then EP study and  possible ablation.     Patient seen in cath holding, in NAD, hemodynamically stable. Denies chest pain, sob, palpitations. Patient has been experiencing episodes of SVT, is being followed by cardiology who is recommending EPS and possible SVT Ablation. He now presents to cath holding to evaluate progression of CAD.   Patient now status post LHC which showed previous LAD stent patent, non-obstructive.       Plan:  -Formal cath report pending  -Post procedure management/monitoring per protocol  -Access site precautions   -RRA with palpable pulses, no ecchymosis or hematoma, no tingling or numbness  -right radial band to be removed if site stable at 2130   -Labs and EKG in am  -Repeat ECG if any clinical indication or change on tele  -continue current Dual anti platelet therapy    -Continue current medical therapy; Statin   -Educated regarding strict adherence with DAPT   -Educated regarding post procedure management and care  -Discussed the importance of risk factors modification  -Cardiac rehab info provided/referral and communication to cardiac rehab completed  -DISPO: Home and Plan for D/C in am if remains HDS, ECG and labs in am stable and without complications 60 y/o male with hx of CAD s/p PCI to LAD in 2/22, HTN, HLD, who was recently seen by his Cardiologist and in process of being scheduled for repeat LHC due to intermittent dyspnea. He came to ED with palpitations, diaphoresis and found to be in SVT. Not hypotensive, trop neg. Given Adenosine with conversion to NSR. Denies any changes in medications, fever, chills, travel, or sick contacts. TSH nor suppressed, lytes WNL, no evidence of ACS/CHF. Seen by EP in ED and advised for admission for LHC and then EP study and  possible ablation.     Patient seen in cath holding, in NAD, hemodynamically stable. Denies chest pain, sob, palpitations. Patient has been experiencing episodes of SVT, is being followed by cardiology who is recommending EPS and possible SVT Ablation. He now presents to cath holding to evaluate progression of CAD.   Patient now status post LHC which showed previous LAD stent patent, non-obstructive.       Plan:  -Formal cath report pending  -Post procedure management/monitoring per protocol  -Access site precautions   -RRA with palpable pulses, no ecchymosis or hematoma, no tingling or numbness  -right radial band to be removed if site stable at 2130   -BP elevated; additional Valsartan 40 mg x1 now, then will increase to Valsartan 160 mg daily starting 5/25/22   -Labs and EKG in am  -Repeat ECG if any clinical indication or change on tele  -continue current Dual anti platelet therapy    -Continue current medical therapy; Statin   -Educated regarding strict adherence with DAPT   -Educated regarding post procedure management and care  -Discussed the importance of risk factors modification  -Cardiac rehab info provided/referral and communication to cardiac rehab completed  -DISPO: Home and Plan for D/C in am if remains HDS, ECG and labs in am stable and without complications

## 2022-05-24 NOTE — H&P ADULT - NSICDXPASTMEDICALHX_GEN_ALL_CORE_FT
PAST MEDICAL HISTORY:  Bronchitis, asthmatic     CAD (coronary artery disease) s/p PCI    Chronic back pain     Diverticulitis     Essential hypertension     H/O carpal tunnel repair     High cholesterol

## 2022-05-24 NOTE — PROGRESS NOTE ADULT - SUBJECTIVE AND OBJECTIVE BOX
KIMMY GRANT    [id number]    61y      Male    Patient is a 61y old  Male who presents with a chief complaint of SVT (24 May 2022 09:25)      INTERVAL HPI/OVERNIGHT EVENTS:    patient has no more chest pain, denies sob, palpitation, he is in NSR now, has no fever, chills, nausea, vomiting, sob, dizziness     REVIEW OF SYSTEMS:    CONSTITUTIONAL: No fever, fatigue  RESPIRATORY: No cough, No shortness of breath  CARDIOVASCULAR: No chest pain, palpitations  GASTROINTESTINAL: No abdominal, No nausea, vomiting  NEUROLOGICAL: No headaches,  loss of strength.  MISCELLANEOUS: No joint swelling or pain       Vital Signs Last 24 Hrs  T(C): 37.1 (24 May 2022 10:00), Max: 37.1 (24 May 2022 10:00)  T(F): 98.7 (24 May 2022 10:00), Max: 98.7 (24 May 2022 10:00)  HR: 63 (24 May 2022 10:00) (63 - 170)  BP: 124/75 (24 May 2022 10:00) (124/75 - 164/86)  BP(mean): 113 (24 May 2022 00:50) (113 - 113)  RR: 17 (24 May 2022 10:00) (16 - 20)  SpO2: 97% (24 May 2022 10:00) (97% - 99%)    PHYSICAL EXAM:    GENERAL: Middle age male looking comfortable    HEENT: PERRL, +EOMI  NECK: soft, Supple, No JVD,   CHEST/LUNG: Clear to auscultate bilaterally; No wheezing  HEART: S1S2+, Regular rate and rhythm; No murmurs  ABDOMEN: Soft, Nontender, Nondistended; Bowel sounds present  EXTREMITIES:  1+ Peripheral Pulses, No edema  SKIN: No rashes or lesions  NEURO: AAOX3, no focal deficits, no motor r sensory loss  PSYCH: normal mood      LABS:                        15.4   6.40  )-----------( 193      ( 24 May 2022 06:05 )             45.8     05-24    142  |  104  |  21.4<H>  ----------------------------<  107<H>  4.1   |  26.0  |  1.13    Ca    9.0      24 May 2022 06:05  Phos  3.6     05-24  Mg     2.1     05-24    TPro  7.7  /  Alb  4.5  /  TBili  0.6  /  DBili  x   /  AST  95<H>  /  ALT  117<H>  /  AlkPhos  129<H>  05-23    PT/INR - ( 23 May 2022 19:13 )   PT: 13.5 sec;   INR: 1.16 ratio         PTT - ( 23 May 2022 19:13 )  PTT:31.6 sec        I&O's Summary    23 May 2022 07:01  -  24 May 2022 07:00  --------------------------------------------------------  IN: 180 mL / OUT: 0 mL / NET: 180 mL        MEDICATIONS  (STANDING):  aDENosine Injectable (ADENOCARD) 6 milliGRAM(s) IV Push once  aDENosine Injectable (ADENOCARD) 12 milliGRAM(s) IV Push once  aspirin enteric coated 81 milliGRAM(s) Oral daily  clopidogrel Tablet 75 milliGRAM(s) Oral daily  FLUoxetine 40 milliGRAM(s) Oral daily  gabapentin 300 milliGRAM(s) Oral at bedtime  melatonin 5 milliGRAM(s) Oral at bedtime  metoprolol tartrate 12.5 milliGRAM(s) Oral two times a day  traZODone 100 milliGRAM(s) Oral at bedtime  valsartan 80 milliGRAM(s) Oral daily    MEDICATIONS  (PRN):  acetaminophen     Tablet .. 650 milliGRAM(s) Oral every 6 hours PRN Temp greater or equal to 38C (100.4F), Mild Pain (1 - 3)  aluminum hydroxide/magnesium hydroxide/simethicone Suspension 30 milliLiter(s) Oral every 4 hours PRN Dyspepsia  ondansetron Injectable 4 milliGRAM(s) IV Push every 8 hours PRN Nausea and/or Vomiting

## 2022-05-25 ENCOUNTER — TRANSCRIPTION ENCOUNTER (OUTPATIENT)
Age: 62
End: 2022-05-25

## 2022-05-25 VITALS
HEART RATE: 58 BPM | SYSTOLIC BLOOD PRESSURE: 173 MMHG | OXYGEN SATURATION: 98 % | RESPIRATION RATE: 18 BRPM | DIASTOLIC BLOOD PRESSURE: 64 MMHG | TEMPERATURE: 98 F

## 2022-05-25 LAB
ALBUMIN SERPL ELPH-MCNC: 4 G/DL — SIGNIFICANT CHANGE UP (ref 3.3–5.2)
ALP SERPL-CCNC: 109 U/L — SIGNIFICANT CHANGE UP (ref 40–120)
ALT FLD-CCNC: 71 U/L — HIGH
ANION GAP SERPL CALC-SCNC: 9 MMOL/L — SIGNIFICANT CHANGE UP (ref 5–17)
AST SERPL-CCNC: 34 U/L — SIGNIFICANT CHANGE UP
BILIRUB SERPL-MCNC: 0.6 MG/DL — SIGNIFICANT CHANGE UP (ref 0.4–2)
BUN SERPL-MCNC: 22.8 MG/DL — HIGH (ref 8–20)
CALCIUM SERPL-MCNC: 9.1 MG/DL — SIGNIFICANT CHANGE UP (ref 8.6–10.2)
CHLORIDE SERPL-SCNC: 105 MMOL/L — SIGNIFICANT CHANGE UP (ref 98–107)
CO2 SERPL-SCNC: 27 MMOL/L — SIGNIFICANT CHANGE UP (ref 22–29)
CREAT SERPL-MCNC: 1.02 MG/DL — SIGNIFICANT CHANGE UP (ref 0.5–1.3)
EGFR: 83 ML/MIN/1.73M2 — SIGNIFICANT CHANGE UP
GLUCOSE SERPL-MCNC: 90 MG/DL — SIGNIFICANT CHANGE UP (ref 70–99)
HCT VFR BLD CALC: 46.9 % — SIGNIFICANT CHANGE UP (ref 39–50)
HGB BLD-MCNC: 15.5 G/DL — SIGNIFICANT CHANGE UP (ref 13–17)
MAGNESIUM SERPL-MCNC: 2 MG/DL — SIGNIFICANT CHANGE UP (ref 1.6–2.6)
MCHC RBC-ENTMCNC: 29.8 PG — SIGNIFICANT CHANGE UP (ref 27–34)
MCHC RBC-ENTMCNC: 33 GM/DL — SIGNIFICANT CHANGE UP (ref 32–36)
MCV RBC AUTO: 90.2 FL — SIGNIFICANT CHANGE UP (ref 80–100)
PHOSPHATE SERPL-MCNC: 3.5 MG/DL — SIGNIFICANT CHANGE UP (ref 2.4–4.7)
PLATELET # BLD AUTO: 196 K/UL — SIGNIFICANT CHANGE UP (ref 150–400)
POTASSIUM SERPL-MCNC: 4.3 MMOL/L — SIGNIFICANT CHANGE UP (ref 3.5–5.3)
POTASSIUM SERPL-SCNC: 4.3 MMOL/L — SIGNIFICANT CHANGE UP (ref 3.5–5.3)
PROT SERPL-MCNC: 6.6 G/DL — SIGNIFICANT CHANGE UP (ref 6.6–8.7)
RBC # BLD: 5.2 M/UL — SIGNIFICANT CHANGE UP (ref 4.2–5.8)
RBC # FLD: 13 % — SIGNIFICANT CHANGE UP (ref 10.3–14.5)
SODIUM SERPL-SCNC: 141 MMOL/L — SIGNIFICANT CHANGE UP (ref 135–145)
T3 SERPL-MCNC: 119 NG/DL — SIGNIFICANT CHANGE UP (ref 80–200)
T3FREE SERPL-MCNC: 2.79 PG/ML — SIGNIFICANT CHANGE UP (ref 1.8–4.6)
T4 AB SER-ACNC: 7 UG/DL — SIGNIFICANT CHANGE UP (ref 4.5–12)
T4 FREE SERPL-MCNC: 1 NG/DL — SIGNIFICANT CHANGE UP (ref 0.9–1.8)
THYROPEROXIDASE AB SERPL-ACNC: 41.6 IU/ML — HIGH
TSH SERPL-MCNC: 2.69 UIU/ML — SIGNIFICANT CHANGE UP (ref 0.27–4.2)
WBC # BLD: 8.06 K/UL — SIGNIFICANT CHANGE UP (ref 3.8–10.5)
WBC # FLD AUTO: 8.06 K/UL — SIGNIFICANT CHANGE UP (ref 3.8–10.5)

## 2022-05-25 PROCEDURE — 87637 SARSCOV2&INF A&B&RSV AMP PRB: CPT

## 2022-05-25 PROCEDURE — C1887: CPT

## 2022-05-25 PROCEDURE — 36415 COLL VENOUS BLD VENIPUNCTURE: CPT

## 2022-05-25 PROCEDURE — 84100 ASSAY OF PHOSPHORUS: CPT

## 2022-05-25 PROCEDURE — 80048 BASIC METABOLIC PNL TOTAL CA: CPT

## 2022-05-25 PROCEDURE — 84443 ASSAY THYROID STIM HORMONE: CPT

## 2022-05-25 PROCEDURE — 83735 ASSAY OF MAGNESIUM: CPT

## 2022-05-25 PROCEDURE — 85730 THROMBOPLASTIN TIME PARTIAL: CPT

## 2022-05-25 PROCEDURE — 71045 X-RAY EXAM CHEST 1 VIEW: CPT

## 2022-05-25 PROCEDURE — 85027 COMPLETE CBC AUTOMATED: CPT

## 2022-05-25 PROCEDURE — 99239 HOSP IP/OBS DSCHRG MGMT >30: CPT

## 2022-05-25 PROCEDURE — 99291 CRITICAL CARE FIRST HOUR: CPT | Mod: 25

## 2022-05-25 PROCEDURE — 93005 ELECTROCARDIOGRAM TRACING: CPT

## 2022-05-25 PROCEDURE — C1894: CPT

## 2022-05-25 PROCEDURE — 93458 L HRT ARTERY/VENTRICLE ANGIO: CPT

## 2022-05-25 PROCEDURE — 85610 PROTHROMBIN TIME: CPT

## 2022-05-25 PROCEDURE — 84439 ASSAY OF FREE THYROXINE: CPT

## 2022-05-25 PROCEDURE — 76705 ECHO EXAM OF ABDOMEN: CPT

## 2022-05-25 PROCEDURE — 84480 ASSAY TRIIODOTHYRONINE (T3): CPT

## 2022-05-25 PROCEDURE — 84436 ASSAY OF TOTAL THYROXINE: CPT

## 2022-05-25 PROCEDURE — 84481 FREE ASSAY (FT-3): CPT

## 2022-05-25 PROCEDURE — 84484 ASSAY OF TROPONIN QUANT: CPT

## 2022-05-25 PROCEDURE — 83880 ASSAY OF NATRIURETIC PEPTIDE: CPT

## 2022-05-25 PROCEDURE — C1769: CPT

## 2022-05-25 PROCEDURE — 80074 ACUTE HEPATITIS PANEL: CPT

## 2022-05-25 PROCEDURE — 86376 MICROSOMAL ANTIBODY EACH: CPT

## 2022-05-25 PROCEDURE — 80053 COMPREHEN METABOLIC PANEL: CPT

## 2022-05-25 PROCEDURE — 85025 COMPLETE CBC W/AUTO DIFF WBC: CPT

## 2022-05-25 RX ADMIN — Medication 650 MILLIGRAM(S): at 05:30

## 2022-05-25 RX ADMIN — CLOPIDOGREL BISULFATE 75 MILLIGRAM(S): 75 TABLET, FILM COATED ORAL at 11:11

## 2022-05-25 RX ADMIN — Medication 81 MILLIGRAM(S): at 11:11

## 2022-05-25 RX ADMIN — VALSARTAN 160 MILLIGRAM(S): 80 TABLET ORAL at 05:29

## 2022-05-25 RX ADMIN — Medication 40 MILLIGRAM(S): at 11:11

## 2022-05-25 RX ADMIN — Medication 12.5 MILLIGRAM(S): at 05:29

## 2022-05-25 NOTE — CHART NOTE - NSCHARTNOTEFT_GEN_A_CORE
Cardiology NP note; Care managed by [place] [facility]  [facility] 5/24/22 no intervention  denies complaints of chest pain/sob/dizziness/palps  Right radial no hematoma / slight ecchymosis noted good pulse and cap refill, dressing removed   radial care instructions given

## 2022-05-25 NOTE — DISCHARGE NOTE PROVIDER - NSDCCPCAREPLAN_GEN_ALL_CORE_FT
PRINCIPAL DISCHARGE DIAGNOSIS  Diagnosis: SVT (supraventricular tachycardia)  Assessment and Plan of Treatment:       SECONDARY DISCHARGE DIAGNOSES  Diagnosis: CAD (coronary artery disease)  Assessment and Plan of Treatment:     Diagnosis: HTN (hypertension)  Assessment and Plan of Treatment:

## 2022-05-25 NOTE — DISCHARGE NOTE PROVIDER - NSDCMRMEDTOKEN_GEN_ALL_CORE_FT
aspirin 81 mg oral tablet: 1 tab(s) orally once a day  atorvastatin 80 mg oral tablet: 1 tab(s) orally once a day   clopidogrel 75 mg oral tablet: 1 tab(s) orally once a day  FLUoxetine 40 mg oral capsule: 1 cap(s) orally once a day  gabapentin 300 mg oral capsule: 1 cap(s) orally once a day (at bedtime)  mesalamine 800 mg oral delayed release tablet: 1 tab(s) orally 3 times a day  Metoprolol Succinate ER 25 mg oral tablet, extended release: 0.5 tab(s) orally once a day   multivitamin: orally once a day  traZODone 100 mg oral tablet: 2 tab(s) orally once a day (at bedtime)  valsartan-hydrochlorothiazide 160mg-12.5mg oral tablet: 1 tab(s) orally once a day

## 2022-05-25 NOTE — DISCHARGE NOTE PROVIDER - CARE PROVIDERS DIRECT ADDRESSES
[email or web address],DirectAddress_Unknown

## 2022-05-25 NOTE — DISCHARGE NOTE PROVIDER - NSDCFUSCHEDAPPT_GEN_ALL_CORE_FT
Alek Chapa  [facility]  [facility] [address]  Scheduled Appointment: 05/26/2022

## 2022-05-25 NOTE — DISCHARGE NOTE PROVIDER - CARE PROVIDER_API CALL
Ranjith Cavazos; MPH)  Cardiology; Internal Medicine  [address] [place] [address]  Phone: [phone]  Fax: [phone]  Follow Up Time:     Mickey Bess)  Cardiology; Internal Medicine  [address] [facility], [address]  [place] [address]  Phone: [phone]  Fax: [phone]  Follow Up Time:    Ranjith Cavazos; MPH)  Cardiology; Internal Medicine  [address] [place] [address]  Phone: [phone]  Fax: [phone]  Follow Up Time:     Markos Castellanos)  Cardiac Electrophysiology; Cardiovascular Disease  [address] [place] [address]  Phone: [phone]  Fax: [phone]  Follow Up Time:     PMD,   As scheduled  Phone: (   )    -  Fax: (   )    -  Follow Up Time:

## 2022-05-25 NOTE — DISCHARGE NOTE PROVIDER - HOSPITAL COURSE
60 y/o male with Hx of CAD s/p PCI in 2/22, HTN, HLD, Insomnia was admitted under medicine for management of with PSVT, elevated LFTs, got adenosine in the ED and converted to NSR, he was monitored and trops cycled came negative, continued with home dose of BB, seen by Cardiology and EP following, Cath done showed  no blockage, further EP study as out patient, he was continued with his home meds for his HTN and CAD, aspirin and plavix, he has no further episode of SVT or chest pain over the course of hospitalization.     he was noted to have Elevated LFTs, OSMEL us done showed Hepatic steatosis, viral hep profile is negative, LFTs trended down to his baseline.   He has Hx of Insomnia and was continued with Trazodone/melatonin   He was noted to have elevated TSH, but looks like lab artifact because repeat test was negative, his TPO antibodies were done and result is pending, will be follow by PMD.     patient is doing well, his all symptoms has been resolved, he is being discharged home in a stable condition.     Vital Signs Last 24 Hrs  T(C): 36.9 (25 May 2022 05:27), Max: 37.1 (24 May 2022 10:00)  T(F): 98.4 (25 May 2022 05:27), Max: 98.7 (24 May 2022 10:00)  HR: 60 (25 May 2022 05:27) (59 - 71)  BP: 148/74 (25 May 2022 05:27) (124/75 - 186/77)  BP(mean): --  RR: 19 (25 May 2022 05:27) (17 - 19)  SpO2: 99% (25 May 2022 05:27) (92% - 99%)    PHYSICAL EXAM:    GENERAL: Middle age male looking comfortable   HEENT: PERRL, +EOMI  NECK: soft, Supple, No JVD,   CHEST/LUNG: Clear to auscultate bilaterally; No wheezing  HEART: S1S2+, Regular rate and rhythm; No murmurs  ABDOMEN: Soft, Nontender, Nondistended; Bowel sounds present  EXTREMITIES:  2+ Peripheral Pulses, No edema  SKIN: No rashes or lesions  NEURO: AAOX3, no focal deficits, no motor r sensory loss  PSYCH: normal mood

## 2022-05-25 NOTE — DISCHARGE NOTE NURSING/CASE MANAGEMENT/SOCIAL WORK - PATIENT PORTAL LINK FT
You can access the FollowMyHealth Patient Portal offered by [facility] by registering at the following website: [email or web address]. By joining [facility]’s FollowMyHealth portal, you will also be able to view your health information using other applications (apps) compatible with our system.

## 2022-05-25 NOTE — DISCHARGE NOTE PROVIDER - PROVIDER TOKENS
PROVIDER:[TOKEN:[[id number]]],PROVIDER:[TOKEN:[[id number]]] PROVIDER:[TOKEN:[[id number]]],PROVIDER:[TOKEN:[[id number]]],FREE:[LAST:[PMD],PHONE:[(   )    -],FAX:[(   )    -],ADDRESS:[As scheduled]]

## 2022-05-26 ENCOUNTER — APPOINTMENT (OUTPATIENT)
Dept: PULMONOLOGY | Facility: CLINIC | Age: 62
End: 2022-05-26

## 2022-05-26 ENCOUNTER — NON-APPOINTMENT (OUTPATIENT)
Age: 62
End: 2022-05-26

## 2022-06-12 ENCOUNTER — RX RENEWAL (OUTPATIENT)
Age: 62
End: 2022-06-12

## 2022-06-22 PROBLEM — I25.10 ATHEROSCLEROTIC HEART DISEASE OF NATIVE CORONARY ARTERY WITHOUT ANGINA PECTORIS: Chronic | Status: ACTIVE | Noted: 2022-05-24

## 2022-06-27 ENCOUNTER — RX RENEWAL (OUTPATIENT)
Age: 62
End: 2022-06-27

## 2022-06-27 ENCOUNTER — OUTPATIENT (OUTPATIENT)
Dept: OUTPATIENT SERVICES | Facility: HOSPITAL | Age: 62
LOS: 1 days | End: 2022-06-27
Payer: COMMERCIAL

## 2022-06-27 VITALS
OXYGEN SATURATION: 97 % | DIASTOLIC BLOOD PRESSURE: 90 MMHG | WEIGHT: 182.1 LBS | HEIGHT: 67 IN | TEMPERATURE: 97 F | SYSTOLIC BLOOD PRESSURE: 138 MMHG | HEART RATE: 97 BPM | RESPIRATION RATE: 20 BRPM

## 2022-06-27 DIAGNOSIS — I47.1 SUPRAVENTRICULAR TACHYCARDIA: ICD-10-CM

## 2022-06-27 DIAGNOSIS — Z98.61 CORONARY ANGIOPLASTY STATUS: Chronic | ICD-10-CM

## 2022-06-27 DIAGNOSIS — Z98.890 OTHER SPECIFIED POSTPROCEDURAL STATES: Chronic | ICD-10-CM

## 2022-06-27 DIAGNOSIS — Z01.818 ENCOUNTER FOR OTHER PREPROCEDURAL EXAMINATION: ICD-10-CM

## 2022-06-27 DIAGNOSIS — U07.1 COVID-19: ICD-10-CM

## 2022-06-27 LAB
ANION GAP SERPL CALC-SCNC: 11 MMOL/L — SIGNIFICANT CHANGE UP (ref 5–17)
APTT BLD: 30 SEC — SIGNIFICANT CHANGE UP (ref 27.5–35.5)
BASOPHILS # BLD AUTO: 0.04 K/UL — SIGNIFICANT CHANGE UP (ref 0–0.2)
BASOPHILS NFR BLD AUTO: 0.5 % — SIGNIFICANT CHANGE UP (ref 0–2)
BLD GP AB SCN SERPL QL: SIGNIFICANT CHANGE UP
BUN SERPL-MCNC: 22.9 MG/DL — HIGH (ref 8–20)
CALCIUM SERPL-MCNC: 9.4 MG/DL — SIGNIFICANT CHANGE UP (ref 8.6–10.2)
CHLORIDE SERPL-SCNC: 103 MMOL/L — SIGNIFICANT CHANGE UP (ref 98–107)
CO2 SERPL-SCNC: 28 MMOL/L — SIGNIFICANT CHANGE UP (ref 22–29)
CREAT SERPL-MCNC: 1.03 MG/DL — SIGNIFICANT CHANGE UP (ref 0.5–1.3)
EGFR: 82 ML/MIN/1.73M2 — SIGNIFICANT CHANGE UP
EOSINOPHIL # BLD AUTO: 0.61 K/UL — HIGH (ref 0–0.5)
EOSINOPHIL NFR BLD AUTO: 7 % — HIGH (ref 0–6)
GLUCOSE SERPL-MCNC: 80 MG/DL — SIGNIFICANT CHANGE UP (ref 70–99)
HCT VFR BLD CALC: 47.5 % — SIGNIFICANT CHANGE UP (ref 39–50)
HGB BLD-MCNC: 15.8 G/DL — SIGNIFICANT CHANGE UP (ref 13–17)
IMM GRANULOCYTES NFR BLD AUTO: 0.2 % — SIGNIFICANT CHANGE UP (ref 0–1.5)
INR BLD: 1.14 RATIO — SIGNIFICANT CHANGE UP (ref 0.88–1.16)
LYMPHOCYTES # BLD AUTO: 1.83 K/UL — SIGNIFICANT CHANGE UP (ref 1–3.3)
LYMPHOCYTES # BLD AUTO: 21 % — SIGNIFICANT CHANGE UP (ref 13–44)
MAGNESIUM SERPL-MCNC: 1.9 MG/DL — SIGNIFICANT CHANGE UP (ref 1.8–2.6)
MCHC RBC-ENTMCNC: 29.6 PG — SIGNIFICANT CHANGE UP (ref 27–34)
MCHC RBC-ENTMCNC: 33.3 GM/DL — SIGNIFICANT CHANGE UP (ref 32–36)
MCV RBC AUTO: 89 FL — SIGNIFICANT CHANGE UP (ref 80–100)
MONOCYTES # BLD AUTO: 0.92 K/UL — HIGH (ref 0–0.9)
MONOCYTES NFR BLD AUTO: 10.6 % — SIGNIFICANT CHANGE UP (ref 2–14)
NEUTROPHILS # BLD AUTO: 5.29 K/UL — SIGNIFICANT CHANGE UP (ref 1.8–7.4)
NEUTROPHILS NFR BLD AUTO: 60.7 % — SIGNIFICANT CHANGE UP (ref 43–77)
PLATELET # BLD AUTO: 253 K/UL — SIGNIFICANT CHANGE UP (ref 150–400)
POTASSIUM SERPL-MCNC: 4.2 MMOL/L — SIGNIFICANT CHANGE UP (ref 3.5–5.3)
POTASSIUM SERPL-SCNC: 4.2 MMOL/L — SIGNIFICANT CHANGE UP (ref 3.5–5.3)
PROTHROM AB SERPL-ACNC: 13.2 SEC — SIGNIFICANT CHANGE UP (ref 10.5–13.4)
RBC # BLD: 5.34 M/UL — SIGNIFICANT CHANGE UP (ref 4.2–5.8)
RBC # FLD: 12.9 % — SIGNIFICANT CHANGE UP (ref 10.3–14.5)
SARS-COV-2 RNA SPEC QL NAA+PROBE: SIGNIFICANT CHANGE UP
SODIUM SERPL-SCNC: 142 MMOL/L — SIGNIFICANT CHANGE UP (ref 135–145)
WBC # BLD: 8.71 K/UL — SIGNIFICANT CHANGE UP (ref 3.8–10.5)
WBC # FLD AUTO: 8.71 K/UL — SIGNIFICANT CHANGE UP (ref 3.8–10.5)

## 2022-06-27 PROCEDURE — 93005 ELECTROCARDIOGRAM TRACING: CPT

## 2022-06-27 PROCEDURE — 93010 ELECTROCARDIOGRAM REPORT: CPT

## 2022-06-27 RX ORDER — MESALAMINE 400 MG
1 TABLET, DELAYED RELEASE (ENTERIC COATED) ORAL
Qty: 0 | Refills: 0 | DISCHARGE

## 2022-06-27 NOTE — H&P PST ADULT - NSICDXPASTMEDICALHX_GEN_ALL_CORE_FT
PAST MEDICAL HISTORY:  Bronchitis, asthmatic     CAD (coronary artery disease) s/p PCI    Chronic back pain     Diverticulitis     Essential hypertension     H/O carpal tunnel repair     High cholesterol      PAST MEDICAL HISTORY:  Bronchitis, asthmatic     CAD (coronary artery disease) s/p PCI    Chronic back pain     Diverticulitis     Essential hypertension     H/O carpal tunnel repair     High cholesterol     SVT (supraventricular tachycardia)

## 2022-06-27 NOTE — H&P PST ADULT - NSICDXPASTSURGICALHX_GEN_ALL_CORE_FT
PAST SURGICAL HISTORY:  H/O carpal tunnel repair 2010    History of cardioversion may 2022    History of PTCA 2/2022     PAST SURGICAL HISTORY:  H/O carpal tunnel repair 2010    History of cardiac cath 2/14/2022    History of cardioversion may 2022    History of PTCA 2012

## 2022-06-27 NOTE — H&P PST ADULT - HISTORY OF PRESENT ILLNESS
61y Male with history of _____________, and symptomatic **paroxysmal or persistent** SVT **add additional description as applicable**     Male now presents in anticipation of elective radiofrequency ablation of SVT      Echocardiogram (date):   Stress Test (date):  Cardiac CT or MRI (date):   Cardiac Cath (date):   Cardiac surgery (date):  61y Male with history of CAD , PCI to LAD this past february 2022. Pt had Most recent episode of SVT on MAY 23, 2022 and went to ER at [facility]. Pt was given IV adenosine which converted the rhythm to sinus . pt has had  symptomatic SVT with chest pain and fast heart rate for several years. pt had cardiac cath  on 5/24/22 and patent LAD stent was noted. Metoprolol was increased from 12.5 to 25 mg QD , ablation was recommended as a treatment  for highly symptomatic SVT. Pt feeling well over past few weeks since adenosine was given , does have BOATENG at times. Pt has not had any recurrent SVT symptoms since then. Pt is scheduled for ablation on 6/30/22.      Male now presents in anticipation of elective radiofrequency ablation of SVT      Echocardiogram (date): 12/10/21 EF 60- 65%   Stress Test (date):1/12/22   Cardiac CT or MRI (date):   Cardiac Cath (date): 5/24/22   Cardiac surgery (date):

## 2022-06-27 NOTE — H&P PST ADULT - ASSESSMENT
61y Male with history of _____________, and symptomatic **paroxysmal or persistent** atrial fibrillation. **add additional description as applicable**     Male now presents in anticipation of elective radiofrequency ablation of SVT      Echocardiogram (date):   Stress Test (date):  Cardiac CT or MRI (date):   Cardiac Cath (date):   Cardiac surgery (date):  Plan: SVT ablation  on 22 with Dr. Jasmin Farley pending.   Pre-procedure instructions provided (verbal & written) as follows:  .   - Continue plavix and aspirin    - NPO after midnight prior except meds w/ sips of water. metoprolol      61y Male with history of CAD , PCI to LAD this past 2022. Pt had Most recent episode of SVT on MAY 23, 2022 and went to ER at [facility]. pt had highly symptomatic  SVT  which terminated with iv adenosine , pt had cardiac cath  on 22 and patent LAD stent was noted. Metoprolol was increased from 12.5 to 25 mg QD , ablation was recommended as a treatment  for highly symptomatic SVT. Pt feeling well over past few weeks since adenosine was given , does have BOATENG at times. Pt has not had any recurrent SVT symptoms since then. Pt is scheduled for ablation on 22. covid pcr obtained at pst vaccinated x3 .     Male now presents in anticipation of elective radiofrequency ablation of SVT  OPIOID RISK TOOL    ZOILA EACH BOX THAT APPLIES AND ADD TOTALS AT THE END    FAMILY HISTORY OF SUBSTANCE ABUSE                 FEMALE         MALE                                                Alcohol                             [  ]1 pt          [  ]3pts                                               Illegal Durgs                     [  ]2 pts        [  ]3pts                                               Rx Drugs                           [  ]4 pts        [  ]4 pts    PERSONAL HISTORY OF SUBSTANCE ABUSE                                                                                          Alcohol                             [  ]3 pts       [  ]3 pts                                               Illegal Durgs                     [  ]4 pts        [  ]4 pts                                               Rx Drugs                           [  ]5 pts        [  ]5 pts    AGE BETWEEN 16-45 YEARS                                      [  ]1 pt         [  ]1 pt    HISTORY OF PREADOLESCENT   SEXUAL ABUSE                                                             [  ]3 pts        [  ]0pts    PSYCHOLOGICAL DISEASE                     ADD, OCD, Bipolar, Schizophrenia        [  ]2 pts         [  ]2 pts                      Depression                                               [  ]1 pt           [  ]1 pt           SCORING TOTAL   (add numbers and type here)              (**0*)                                     A score of 3 or lower indicated LOW risk for future opiod abuse  A score of 4 to 7 indicated moderate risk for future opiod abuse  A score of 8 or higher indicates a high risk for opiod abuse  CAPRINI VTE 2.0 SCORE [CLOT updated 2019]    AGE RELATED RISK FACTORS                                                       MOBILITY RELATED FACTORS  [ ] Age 41-60 years                                            (1 Point)                    [ ] Bed rest                                                        (1 Point)  X[ ] Age: 61-74 years                                           (2 Points)                  [ ] Plaster cast                                                   (2 Points)  [ ] Age= 75 years                                              (3 Points)                    [ ] Bed bound for more than 72 hours                 (2 Points)    DISEASE RELATED RISK FACTORS                                               GENDER SPECIFIC FACTORS  [ ] Edema in the lower extremities                       (1 Point)              [ ] Pregnancy                                                     (1 Point)  [ ] Varicose veins                                               (1 Point)                     [ ] Post-partum < 6 weeks                                   (1 Point)             [X ] BMI > 25 Kg/m2                                            (1 Point)                     [ ] Hormonal therapy  or oral contraception          (1 Point)                 [ ] Sepsis (in the previous month)                        (1 Point)               [ ] History of pregnancy complications                 (1 point)  [ ] Pneumonia or serious lung disease                                               [ ] Unexplained or recurrent                     (1 Point)           (in the previous month)                               (1 Point)  [ ] Abnormal pulmonary function test                     (1 Point)                 SURGERY RELATED RISK FACTORS  [ ] Acute myocardial infarction                              (1 Point)               [ ]  Section                                             (1 Point)  [ ] Congestive heart failure (in the previous month)  (1 Point)      [ ] Minor surgery                                                  (1 Point)   [ ] Inflammatory bowel disease                             (1 Point)               [ ] Arthroscopic surgery                                        (2 Points)  [ ] Central venous access                                      (2 Points)                [ X] General surgery lasting more than 45 minutes (2 points)  [ ] Malignancy- Present or previous                   (2 Points)                [ ] Elective arthroplasty                                         (5 points)    [ ] Stroke (in the previous month)                          (5 Points)                                                                                                                                                           HEMATOLOGY RELATED FACTORS                                                 TRAUMA RELATED RISK FACTORS  [ ] Prior episodes of VTE                                     (3 Points)                [ ] Fracture of the hip, pelvis, or leg                       (5 Points)  [ ] Positive family history for VTE                         (3 Points)             [ ] Acute spinal cord injury (in the previous month)  (5 Points)  [ ] Prothrombin 79349 A                                     (3 Points)               [ ] Paralysis  (less than 1 month)                             (5 Points)  [ ] Factor V Leiden                                             (3 Points)                  [ ] Multiple Trauma within 1 month                        (5 Points)  [ ] Lupus anticoagulants                                     (3 Points)                                                           [ ] Anticardiolipin antibodies                               (3 Points)                                                       [ ] High homocysteine in the blood                      (3 Points)                                             [ ] Other congenital or acquired thrombophilia      (3 Points)                                                [ ] Heparin induced thrombocytopenia                  (3 Points)                                     Total Score [     5     ]

## 2022-06-30 ENCOUNTER — TRANSCRIPTION ENCOUNTER (OUTPATIENT)
Age: 62
End: 2022-06-30

## 2022-06-30 ENCOUNTER — INPATIENT (INPATIENT)
Facility: HOSPITAL | Age: 62
LOS: 0 days | Discharge: HOME | DRG: 274 | End: 2022-07-01
Attending: INTERNAL MEDICINE | Admitting: INTERNAL MEDICINE
Payer: COMMERCIAL

## 2022-06-30 VITALS
RESPIRATION RATE: 16 BRPM | DIASTOLIC BLOOD PRESSURE: 85 MMHG | SYSTOLIC BLOOD PRESSURE: 147 MMHG | WEIGHT: 182.1 LBS | OXYGEN SATURATION: 97 % | HEIGHT: 67 IN | HEART RATE: 66 BPM

## 2022-06-30 DIAGNOSIS — I47.1 SUPRAVENTRICULAR TACHYCARDIA: ICD-10-CM

## 2022-06-30 DIAGNOSIS — Z98.890 OTHER SPECIFIED POSTPROCEDURAL STATES: Chronic | ICD-10-CM

## 2022-06-30 DIAGNOSIS — Z98.61 CORONARY ANGIOPLASTY STATUS: Chronic | ICD-10-CM

## 2022-06-30 PROCEDURE — 93010 ELECTROCARDIOGRAM REPORT: CPT

## 2022-06-30 RX ORDER — ATORVASTATIN CALCIUM 80 MG/1
80 TABLET, FILM COATED ORAL AT BEDTIME
Refills: 0 | Status: DISCONTINUED | OUTPATIENT
Start: 2022-06-30 | End: 2022-07-01

## 2022-06-30 RX ORDER — CLOPIDOGREL BISULFATE 75 MG/1
75 TABLET, FILM COATED ORAL DAILY
Refills: 0 | Status: DISCONTINUED | OUTPATIENT
Start: 2022-06-30 | End: 2022-07-01

## 2022-06-30 RX ORDER — TRAZODONE HCL 50 MG
2 TABLET ORAL
Qty: 0 | Refills: 0 | DISCHARGE

## 2022-06-30 RX ORDER — LANOLIN ALCOHOL/MO/W.PET/CERES
1 CREAM (GRAM) TOPICAL
Qty: 0 | Refills: 0 | DISCHARGE

## 2022-06-30 RX ORDER — VALSARTAN 80 MG/1
160 TABLET ORAL DAILY
Refills: 0 | Status: DISCONTINUED | OUTPATIENT
Start: 2022-06-30 | End: 2022-07-01

## 2022-06-30 RX ORDER — GABAPENTIN 400 MG/1
300 CAPSULE ORAL AT BEDTIME
Refills: 0 | Status: DISCONTINUED | OUTPATIENT
Start: 2022-06-30 | End: 2022-07-01

## 2022-06-30 RX ORDER — LANOLIN ALCOHOL/MO/W.PET/CERES
5 CREAM (GRAM) TOPICAL AT BEDTIME
Refills: 0 | Status: DISCONTINUED | OUTPATIENT
Start: 2022-06-30 | End: 2022-07-01

## 2022-06-30 RX ORDER — OXYCODONE AND ACETAMINOPHEN 5; 325 MG/1; MG/1
1 TABLET ORAL EVERY 6 HOURS
Refills: 0 | Status: DISCONTINUED | OUTPATIENT
Start: 2022-06-30 | End: 2022-07-01

## 2022-06-30 RX ORDER — ASPIRIN/CALCIUM CARB/MAGNESIUM 324 MG
81 TABLET ORAL DAILY
Refills: 0 | Status: DISCONTINUED | OUTPATIENT
Start: 2022-06-30 | End: 2022-07-01

## 2022-06-30 RX ORDER — METOPROLOL TARTRATE 50 MG
25 TABLET ORAL DAILY
Refills: 0 | Status: DISCONTINUED | OUTPATIENT
Start: 2022-06-30 | End: 2022-07-01

## 2022-06-30 RX ORDER — ASPIRIN/CALCIUM CARB/MAGNESIUM 324 MG
1 TABLET ORAL
Qty: 0 | Refills: 0 | DISCHARGE

## 2022-06-30 RX ORDER — HYDROCHLOROTHIAZIDE 25 MG
12.5 TABLET ORAL DAILY
Refills: 0 | Status: DISCONTINUED | OUTPATIENT
Start: 2022-06-30 | End: 2022-07-01

## 2022-06-30 RX ORDER — FLUOXETINE HCL 10 MG
40 CAPSULE ORAL DAILY
Refills: 0 | Status: DISCONTINUED | OUTPATIENT
Start: 2022-06-30 | End: 2022-07-01

## 2022-06-30 RX ORDER — FLUOXETINE HCL 10 MG
1 CAPSULE ORAL
Qty: 0 | Refills: 0 | DISCHARGE

## 2022-06-30 RX ORDER — TRAZODONE HCL 50 MG
100 TABLET ORAL AT BEDTIME
Refills: 0 | Status: DISCONTINUED | OUTPATIENT
Start: 2022-06-30 | End: 2022-07-01

## 2022-06-30 RX ORDER — ACETAMINOPHEN 500 MG
650 TABLET ORAL EVERY 6 HOURS
Refills: 0 | Status: DISCONTINUED | OUTPATIENT
Start: 2022-06-30 | End: 2022-07-01

## 2022-06-30 RX ADMIN — Medication 5 MILLIGRAM(S): at 22:09

## 2022-06-30 RX ADMIN — Medication 100 MILLIGRAM(S): at 22:10

## 2022-06-30 RX ADMIN — Medication 650 MILLIGRAM(S): at 13:13

## 2022-06-30 RX ADMIN — VALSARTAN 160 MILLIGRAM(S): 80 TABLET ORAL at 19:34

## 2022-06-30 RX ADMIN — ATORVASTATIN CALCIUM 80 MILLIGRAM(S): 80 TABLET, FILM COATED ORAL at 21:16

## 2022-06-30 RX ADMIN — Medication 40 MILLIGRAM(S): at 19:34

## 2022-06-30 RX ADMIN — GABAPENTIN 300 MILLIGRAM(S): 400 CAPSULE ORAL at 20:12

## 2022-06-30 NOTE — DISCHARGE NOTE PROVIDER - CARE PROVIDER_API CALL
Markos Castellanos (MD)  Cardiac Electrophysiology; Cardiovascular Disease  [address] [place] [address]  Phone: [phone]  Fax: [phone]  Follow Up Time:

## 2022-06-30 NOTE — DISCHARGE NOTE PROVIDER - HOSPITAL COURSE
61 year old male with PMH of HTN, CAD s/p PCI to LAD 2/2022, sleep apnea, anxiety, arthritis, and symptomatic SVT. He presented electively and is now status post elective radiofrequency ablation of SVT (AVNRT).

## 2022-06-30 NOTE — DISCHARGE NOTE PROVIDER - NSDCMRMEDTOKEN_GEN_ALL_CORE_FT
aspirin 81 mg oral tablet: 1 tab(s) orally once a day  atorvastatin 80 mg oral tablet: 1 tab(s) orally once a day   clopidogrel 75 mg oral tablet: 1 tab(s) orally once a day  FLUoxetine 40 mg oral capsule: 1 cap(s) orally once a day  gabapentin 300 mg oral capsule: 1 cap(s) orally once a day (at bedtime)  Melatonin 5 mg oral tablet: 1 tab(s) orally once a day (at bedtime)  Metoprolol Succinate ER 25 mg oral tablet, extended release: 0.5 tab(s) orally once a day   multivitamin: orally once a day  traZODone 100 mg oral tablet: 2 tab(s) orally once a day (at bedtime)  valsartan-hydrochlorothiazide 160mg-12.5mg oral tablet: 1 tab(s) orally once a day   aspirin 81 mg oral tablet: 1 tab(s) orally once a day  atorvastatin 80 mg oral tablet: 1 tab(s) orally once a day   clopidogrel 75 mg oral tablet: 1 tab(s) orally once a day  FLUoxetine 40 mg oral capsule: 1 cap(s) orally once a day  gabapentin 300 mg oral capsule: 1 cap(s) orally once a day (at bedtime)  hydroCHLOROthiazide 12.5 mg oral capsule: 1 cap(s) orally once a day  Melatonin 5 mg oral tablet: 1 tab(s) orally once a day (at bedtime)  Metoprolol Succinate ER 25 mg oral tablet, extended release: 0.5 tab(s) orally once a day   multivitamin: orally once a day  traZODone 100 mg oral tablet: 2 tab(s) orally once a day (at bedtime)  valsartan 160 mg oral tablet: 1 tab(s) orally once a day  valsartan-hydrochlorothiazide 160mg-12.5mg oral tablet: 1 tab(s) orally once a day

## 2022-06-30 NOTE — DISCHARGE NOTE PROVIDER - NSDCCPTREATMENT_GEN_ALL_CORE_FT
PRINCIPAL PROCEDURE  Procedure: Cardiac electrophysiology study with radiofrequency ablation (RFA)  Findings and Treatment: - Bruising at the groin, sometimes extending down the leg, and/or a small lump under the skin at the groin access site is normal and will resolve within 2 – 3 weeks.   - Occasional skipped beats or palpitations that last for a few beats are common and generally resolve within 1-2 months.   - You may walk and take stairs at a regular pace.   - Do not perform any exercise more strenuous than walking for 1 week.   - Do not strain or lift heavy objects for 1 week.  - You may shower the day after the procedure.  - Do not soak in water (such as tub baths, hot tubs, swimming, etc.) for 1 week.   - You may resume all other activities the day after the procedure.  Call your doctor if:   - you notice bleeding, redness, drainage, swelling, increased tenderness or a hot sensation around the catheter insertion site.   - your temperature is greater than 100 degrees F for more than 24 hours.  - your rapid heart rhythm returns.  - you have any questions or concerns regarding the procedure.  If significant bleeding and/or a large lump (the size of a golf ball or bigger) occurs:  - Lie flat and apply continuous direct pressure just above the puncture site for at least 10 minutes  - If the issue resolves, notify your physician immediately.    - If the bleeding cannot be controlled, please seek immediate medical attention.  If you experience increased difficulty breathing or chest pain, or if you faint or have dizzy spells, please seek immediate medical attention.

## 2022-07-01 ENCOUNTER — TRANSCRIPTION ENCOUNTER (OUTPATIENT)
Age: 62
End: 2022-07-01

## 2022-07-01 VITALS
HEART RATE: 55 BPM | DIASTOLIC BLOOD PRESSURE: 90 MMHG | TEMPERATURE: 98 F | RESPIRATION RATE: 18 BRPM | SYSTOLIC BLOOD PRESSURE: 167 MMHG | OXYGEN SATURATION: 100 %

## 2022-07-01 LAB
ANION GAP SERPL CALC-SCNC: 11 MMOL/L — SIGNIFICANT CHANGE UP (ref 5–17)
BUN SERPL-MCNC: 19.1 MG/DL — SIGNIFICANT CHANGE UP (ref 8–20)
CALCIUM SERPL-MCNC: 8.4 MG/DL — LOW (ref 8.6–10.2)
CHLORIDE SERPL-SCNC: 107 MMOL/L — SIGNIFICANT CHANGE UP (ref 98–107)
CO2 SERPL-SCNC: 24 MMOL/L — SIGNIFICANT CHANGE UP (ref 22–29)
CREAT SERPL-MCNC: 0.91 MG/DL — SIGNIFICANT CHANGE UP (ref 0.5–1.3)
EGFR: 95 ML/MIN/1.73M2 — SIGNIFICANT CHANGE UP
GLUCOSE SERPL-MCNC: 105 MG/DL — HIGH (ref 70–99)
HCT VFR BLD CALC: 46.1 % — SIGNIFICANT CHANGE UP (ref 39–50)
HGB BLD-MCNC: 15.3 G/DL — SIGNIFICANT CHANGE UP (ref 13–17)
MAGNESIUM SERPL-MCNC: 2 MG/DL — SIGNIFICANT CHANGE UP (ref 1.6–2.6)
MCHC RBC-ENTMCNC: 29.8 PG — SIGNIFICANT CHANGE UP (ref 27–34)
MCHC RBC-ENTMCNC: 33.2 GM/DL — SIGNIFICANT CHANGE UP (ref 32–36)
MCV RBC AUTO: 89.7 FL — SIGNIFICANT CHANGE UP (ref 80–100)
PLATELET # BLD AUTO: 226 K/UL — SIGNIFICANT CHANGE UP (ref 150–400)
POTASSIUM SERPL-MCNC: 4.1 MMOL/L — SIGNIFICANT CHANGE UP (ref 3.5–5.3)
POTASSIUM SERPL-SCNC: 4.1 MMOL/L — SIGNIFICANT CHANGE UP (ref 3.5–5.3)
RBC # BLD: 5.14 M/UL — SIGNIFICANT CHANGE UP (ref 4.2–5.8)
RBC # FLD: 13 % — SIGNIFICANT CHANGE UP (ref 10.3–14.5)
SODIUM SERPL-SCNC: 142 MMOL/L — SIGNIFICANT CHANGE UP (ref 135–145)
WBC # BLD: 8.53 K/UL — SIGNIFICANT CHANGE UP (ref 3.8–10.5)
WBC # FLD AUTO: 8.53 K/UL — SIGNIFICANT CHANGE UP (ref 3.8–10.5)

## 2022-07-01 PROCEDURE — 93005 ELECTROCARDIOGRAM TRACING: CPT

## 2022-07-01 PROCEDURE — C1893: CPT

## 2022-07-01 PROCEDURE — C1730: CPT

## 2022-07-01 PROCEDURE — 80048 BASIC METABOLIC PNL TOTAL CA: CPT

## 2022-07-01 PROCEDURE — 93010 ELECTROCARDIOGRAM REPORT: CPT

## 2022-07-01 PROCEDURE — 36415 COLL VENOUS BLD VENIPUNCTURE: CPT

## 2022-07-01 PROCEDURE — 83735 ASSAY OF MAGNESIUM: CPT

## 2022-07-01 PROCEDURE — C1894: CPT

## 2022-07-01 PROCEDURE — 93653 COMPRE EP EVAL TX SVT: CPT

## 2022-07-01 PROCEDURE — 93623 PRGRMD STIMJ&PACG IV RX NFS: CPT

## 2022-07-01 PROCEDURE — 85027 COMPLETE CBC AUTOMATED: CPT

## 2022-07-01 RX ORDER — VALSARTAN 80 MG/1
1 TABLET ORAL
Qty: 0 | Refills: 0 | DISCHARGE
Start: 2022-07-01

## 2022-07-01 RX ADMIN — Medication 12.5 MILLIGRAM(S): at 05:40

## 2022-07-01 RX ADMIN — VALSARTAN 160 MILLIGRAM(S): 80 TABLET ORAL at 05:40

## 2022-07-01 RX ADMIN — Medication 81 MILLIGRAM(S): at 09:48

## 2022-07-01 RX ADMIN — Medication 25 MILLIGRAM(S): at 05:47

## 2022-07-01 RX ADMIN — Medication 40 MILLIGRAM(S): at 09:50

## 2022-07-01 RX ADMIN — CLOPIDOGREL BISULFATE 75 MILLIGRAM(S): 75 TABLET, FILM COATED ORAL at 09:48

## 2022-07-01 NOTE — PROGRESS NOTE ADULT - SUBJECTIVE AND OBJECTIVE BOX
Pt doing well POD #1 s/p elective radiofrequency supraventricular tachycardia ablation (AVNRT) via b/l FV access. b/l groins WNL this AM..  Pt denies any complaints this morning.     EKG: Sinus bradycardia (56BPM) intact conduction.   TELE: Sinus rhythm with intact conduction    MEDICATIONS  (STANDING):  aspirin enteric coated 81 milliGRAM(s) Oral daily  atorvastatin 80 milliGRAM(s) Oral at bedtime  clopidogrel Tablet 75 milliGRAM(s) Oral daily  FLUoxetine 40 milliGRAM(s) Oral daily  gabapentin 300 milliGRAM(s) Oral at bedtime  hydrochlorothiazide 12.5 milliGRAM(s) Oral daily  metoprolol succinate ER 25 milliGRAM(s) Oral daily  traZODone 100 milliGRAM(s) Oral at bedtime  valsartan 160 milliGRAM(s) Oral daily    MEDICATIONS  (PRN):  acetaminophen     Tablet .. 650 milliGRAM(s) Oral every 6 hours PRN Mild Pain (1 - 3), Moderate Pain (4 - 6)  melatonin 5 milliGRAM(s) Oral at bedtime PRN Insomnia  oxycodone    5 mG/acetaminophen 325 mG 1 Tablet(s) Oral every 6 hours PRN Severe Pain (7 - 10)      Allergies    latex (Rash)  No Known Drug Allergies      PAST MEDICAL & SURGICAL HISTORY:  Essential hypertension      High cholesterol      Chronic back pain      Diverticulitis      Bronchitis, asthmatic      H/O carpal tunnel repair      CAD (coronary artery disease)  s/p PCI      SVT (supraventricular tachycardia)      History of cardioversion  may 2022      History of PTCA  2012      H/O carpal tunnel repair  2010      History of cardiac cath  2/14/2022          Vital Signs Last 24 Hrs  T(C): 36.9 (01 Jul 2022 05:30), Max: 37.1 (30 Jun 2022 20:00)  T(F): 98.5 (01 Jul 2022 05:30), Max: 98.7 (30 Jun 2022 20:00)  HR: 60 (01 Jul 2022 05:30) (60 - 73)  BP: 157/88 (01 Jul 2022 05:30) (134/81 - 157/95)  BP(mean): --  RR: 17 (01 Jul 2022 05:30) (16 - 20)  SpO2: 94% (01 Jul 2022 05:30) (94% - 97%)    Physical Exam:  Constitutional: NAD, AAOx3  Cardiovascular: +S1S2 RRR  Pulmonary: CTA b/l, unlabored  Abd: soft NTND +BS  Groins: C/D/I bilaterally; no bleeding, hematoma, edema  Extremities: no pedal edema, +distal pulses b/l  Neuro: non focal, MAXWELL x4    LABS:                        15.3   8.53  )-----------( 226      ( 01 Jul 2022 06:08 )             46.1     07-01    142  |  107  |  19.1  ----------------------------<  105<H>  4.1   |  24.0  |  0.91    Ca    8.4<L>      01 Jul 2022 06:08  Mg     2.0     07-01            Assessment:   61 year old male with PMH of HTN, CAD s/p PCI to LAD 2/2022, sleep apnea, anxiety, arthritis, and symptomatic SVT. Now POD #1 elective radiofrequency ablation of SVT (AVNRT), access via b/l femoral veins. Pt reports no complaints this morning.       Plan:   Pt instructed as activity limitations - no lifting/pushing/pulling >10 lbs or strenuous exercise x 1 week.   Pt instructed as to access site care and f/up - written instructions included in d/c documents.  Outpt f/up in 2-4 weeks with Dr. Castellanos  
61 year old male with PMH of HTN, CAD s/p PCI to LAD 2/2022, sleep apnea, anxiety, arthritis, and symptomatic SVT. He presents today for elective radiofrequency ablation of SVT.     PST and labs from 6/27/22 reviewed; denies interval change.   Confirms NPO > 8 hrs, last dose metoprolol this AM.     - iv heplock  - consent linda/MD     
Admission Criteria  Please admit the patient to the following service: CARDIOLOGY    Major Criteria:  - Continuous EKG monitoring is required for condition causing arrhythmia (hyperkalemia, etc)  - Significant volume load > 200 ml    Admit to: 3GUL     Patient is being admitted to the inpatient service due to high risk characteristics and need for further management/monitoring and is considered to be at a significantly increased risk of major adverse cardiac and vascular events if discharged.  
PROCEDURE(S): Radiofrequency Ablation of Supraventricular Tachycardia     ELECTROPHYSIOLOGIST(S): Markos Castellanos MD         COMPLICATIONS:  none        DISPOSITION: observation      CONDITION: stable    Pt doing well s/p elective radiofrequency supraventricular tachycardia ablation (AVNRT) via b/l FV access.  Pt denies complaint post procedure.     MEDICATIONS  (STANDING):  aspirin enteric coated 81 milliGRAM(s) Oral daily  atorvastatin 80 milliGRAM(s) Oral at bedtime  clopidogrel Tablet 75 milliGRAM(s) Oral daily  FLUoxetine 40 milliGRAM(s) Oral daily  gabapentin 300 milliGRAM(s) Oral at bedtime  hydrochlorothiazide 12.5 milliGRAM(s) Oral daily  metoprolol succinate ER 25 milliGRAM(s) Oral daily  traZODone 100 milliGRAM(s) Oral at bedtime  valsartan 160 milliGRAM(s) Oral daily    MEDICATIONS  (PRN):  acetaminophen     Tablet .. 650 milliGRAM(s) Oral every 6 hours PRN Mild Pain (1 - 3), Moderate Pain (4 - 6)  melatonin 5 milliGRAM(s) Oral at bedtime PRN Insomnia  oxycodone    5 mG/acetaminophen 325 mG 1 Tablet(s) Oral every 6 hours PRN Severe Pain (7 - 10)    Allergies:  latex (Rash)  No Known Drug Allergies    Exam:   HR: 71 (06-30-22 @ 11:40) (66 - 71)  BP: 156/90 (06-30-22 @ 11:40) (147/85 - 156/90)  RR: 20 (06-30-22 @ 11:40) (16 - 20)  SpO2: 95% (06-30-22 @ 11:40) (95% - 97%)  Post- procedure VS: /86 HR 73 O2 sat 97% RR 16     Physical exam:   awake, alert, no obvious distress  Card: S1/S2, RRR, no m/g/r  Resp: lungs CTA b/l  Abd: S/NT/ND  Groins: sites C/D/I; no bleeding, hematoma, erythema, exudate or edema  Ext: no edema; distal pulses intact    ECG: NSR 73 bpm, LAD     Assessment:   61 year old male with PMH of HTN, CAD s/p PCI to LAD 2/2022, sleep apnea, anxiety, arthritis, and symptomatic SVT. He presented electively and is now status post elective radiofrequency ablation of SVT (AVNRT).    Plan:   Admit to telemetry/ONU  Bedrest x 4 hours, then OOB with assistance and progress as tolerated.   Continue home medications.   Labs and EKG in AM.   Strict I/Os.  Please encourage incentive spirometry and ambulation once able.  Observation and monitoring on telemetry overnight with anticipated discharge in the AM and outpt follow up in 1 month.

## 2022-07-01 NOTE — DISCHARGE NOTE NURSING/CASE MANAGEMENT/SOCIAL WORK - NSDCPEFALRISK_GEN_ALL_CORE
For information on Fall & Injury Prevention, visit: [email or web address] OR  [email or web address] OR  https://www.cdc.gov/steadi/patient.html

## 2022-07-07 ENCOUNTER — NON-APPOINTMENT (OUTPATIENT)
Age: 62
End: 2022-07-07

## 2022-08-12 NOTE — PROCEDURE NOTE - NSSIZEINFR_GEN_A_CORE
7 Scribe Attestation (For Scribes USE Only)... Attending Attestation (For Attendings USE Only).../Scribe Attestation (For Scribes USE Only)...

## 2022-09-01 PROBLEM — I47.1 SUPRAVENTRICULAR TACHYCARDIA: Chronic | Status: ACTIVE | Noted: 2022-06-27

## 2022-09-12 ENCOUNTER — RX RENEWAL (OUTPATIENT)
Age: 62
End: 2022-09-12

## 2022-09-29 ENCOUNTER — APPOINTMENT (OUTPATIENT)
Dept: PULMONOLOGY | Facility: CLINIC | Age: 62
End: 2022-09-29

## 2022-11-02 ENCOUNTER — APPOINTMENT (OUTPATIENT)
Dept: INTERNAL MEDICINE | Facility: CLINIC | Age: 62
End: 2022-11-02

## 2022-11-02 VITALS
DIASTOLIC BLOOD PRESSURE: 80 MMHG | OXYGEN SATURATION: 99 % | SYSTOLIC BLOOD PRESSURE: 122 MMHG | WEIGHT: 187 LBS | BODY MASS INDEX: 29.35 KG/M2 | HEART RATE: 76 BPM | HEIGHT: 67 IN

## 2022-11-02 DIAGNOSIS — U07.1 COVID-19: ICD-10-CM

## 2022-11-02 PROCEDURE — 36415 COLL VENOUS BLD VENIPUNCTURE: CPT

## 2022-11-02 PROCEDURE — 99214 OFFICE O/P EST MOD 30 MIN: CPT | Mod: 25

## 2022-11-02 RX ORDER — VALSARTAN AND HYDROCHLOROTHIAZIDE 160; 12.5 MG/1; MG/1
160-12.5 TABLET, FILM COATED ORAL DAILY
Qty: 90 | Refills: 1 | Status: DISCONTINUED | COMMUNITY
Start: 2017-09-12 | End: 2022-11-02

## 2022-11-02 RX ORDER — MESALAMINE 800 MG/1
800 TABLET, DELAYED RELEASE ORAL 3 TIMES DAILY
Refills: 0 | Status: DISCONTINUED | COMMUNITY
Start: 2022-02-11 | End: 2022-11-02

## 2022-11-02 NOTE — HISTORY OF PRESENT ILLNESS
[FreeTextEntry1] : covid followup  [de-identified] : 61 yo MALE WITH HX OF HTN HLD  AND  DIVERTICULITIS  WITH RECENT HOSP STAY WITH CP AND UNDERWENT A CARDIAC CATH AND STENT Placement pt devloped  COVID ABOUT 2 WEEKS AGO WAS GIVEN MEDROL DOSE PACK AND  COUGH MEDICINE\par PT FEELS BETTER WSANOT HYPOXIC BUT STILL WITH STAS SOB BUT HAD THIS BEFORE COVID\par DID NTO GET A CXR\par

## 2022-11-02 NOTE — PHYSICAL EXAM
[Well Nourished] : well nourished [Well Developed] : well developed [Well-Appearing] : well-appearing [Normal Sclera/Conjunctiva] : normal sclera/conjunctiva [PERRL] : pupils equal round and reactive to light [EOMI] : extraocular movements intact [Normal Outer Ear/Nose] : the outer ears and nose were normal in appearance [Normal Oropharynx] : the oropharynx was normal [No JVD] : no jugular venous distention [No Lymphadenopathy] : no lymphadenopathy [Supple] : supple [Thyroid Normal, No Nodules] : the thyroid was normal and there were no nodules present [No Respiratory Distress] : no respiratory distress  [No Accessory Muscle Use] : no accessory muscle use [Clear to Auscultation] : lungs were clear to auscultation bilaterally [Normal Rate] : normal rate  [Regular Rhythm] : with a regular rhythm [Normal S1, S2] : normal S1 and S2 [No Murmur] : no murmur heard [No Carotid Bruits] : no carotid bruits [No Abdominal Bruit] : a ~M bruit was not heard ~T in the abdomen [No Varicosities] : no varicosities [Pedal Pulses Present] : the pedal pulses are present [No Edema] : there was no peripheral edema [No Palpable Aorta] : no palpable aorta [No Extremity Clubbing/Cyanosis] : no extremity clubbing/cyanosis [Soft] : abdomen soft [Non-distended] : non-distended [No Masses] : no abdominal mass palpated [No HSM] : no HSM [Normal Bowel Sounds] : normal bowel sounds [Normal Posterior Cervical Nodes] : no posterior cervical lymphadenopathy [Normal Anterior Cervical Nodes] : no anterior cervical lymphadenopathy [No CVA Tenderness] : no CVA  tenderness [No Spinal Tenderness] : no spinal tenderness [No Joint Swelling] : no joint swelling [Grossly Normal Strength/Tone] : grossly normal strength/tone [No Rash] : no rash [Coordination Grossly Intact] : coordination grossly intact [No Focal Deficits] : no focal deficits [Normal Gait] : normal gait [Normal Affect] : the affect was normal [Normal Insight/Judgement] : insight and judgment were intact [de-identified] : MILD TENDERNESS BOTH LEFT AND RIGHT LOWER  QUADRANS MORE ON RIGHT THAN LEFT  UMBILICAL HERNIA

## 2022-11-02 NOTE — PLAN
[FreeTextEntry1] : 63 yo MALE WITH HX OF HTN HLD  AND  DIVERTICULITIS  WITH RECENT HOSP STAY WITH CP AND UNDERWENT A CARDIAC CATH AND STENT Placement pt devloped  COVID ABOUT 2 WEEKS AGO WAS GIVEN MEDROL DOSE PACK AND  COUGH MEDICINE\par PT FEELS BETTER WSANOT HYPOXIC BUT STILL WITH STAS SOB BUT HAD THIS BEFORE COVID\par DID NTO GET A CXR\par  WILL OBTIAN CXR \par DEFER ABX AS PT APPEARS TO BE IMPROVING\par WILL CHECK LABS\par FOLLW UP WITH PULM\par

## 2022-11-02 NOTE — REVIEW OF SYSTEMS
[Shortness Of Breath] : shortness of breath [Cough] : cough [Abdominal Pain] : abdominal pain [Negative] : Heme/Lymph

## 2022-11-04 ENCOUNTER — OUTPATIENT (OUTPATIENT)
Dept: OUTPATIENT SERVICES | Facility: HOSPITAL | Age: 62
LOS: 1 days | End: 2022-11-04

## 2022-11-04 DIAGNOSIS — Z98.890 OTHER SPECIFIED POSTPROCEDURAL STATES: Chronic | ICD-10-CM

## 2022-11-04 DIAGNOSIS — R05.9 COUGH, UNSPECIFIED: ICD-10-CM

## 2022-11-04 DIAGNOSIS — Z98.61 CORONARY ANGIOPLASTY STATUS: Chronic | ICD-10-CM

## 2022-11-04 PROCEDURE — 71046 X-RAY EXAM CHEST 2 VIEWS: CPT | Mod: 26

## 2022-11-10 LAB
ALBUMIN SERPL ELPH-MCNC: 4.3 G/DL
ALP BLD-CCNC: 121 U/L
ALT SERPL-CCNC: 50 U/L
ANION GAP SERPL CALC-SCNC: 14 MMOL/L
AST SERPL-CCNC: 32 U/L
BASOPHILS # BLD AUTO: 0.04 K/UL
BASOPHILS NFR BLD AUTO: 0.5 %
BILIRUB SERPL-MCNC: 0.7 MG/DL
BUN SERPL-MCNC: 26 MG/DL
CALCIUM SERPL-MCNC: 10 MG/DL
CHLORIDE SERPL-SCNC: 99 MMOL/L
CHOLEST SERPL-MCNC: 142 MG/DL
CO2 SERPL-SCNC: 27 MMOL/L
CREAT SERPL-MCNC: 1.28 MG/DL
EGFR: 62 ML/MIN/1.73M2
EOSINOPHIL # BLD AUTO: 0.38 K/UL
EOSINOPHIL NFR BLD AUTO: 4.5 %
ESTIMATED AVERAGE GLUCOSE: 143 MG/DL
GLUCOSE SERPL-MCNC: 125 MG/DL
HBA1C MFR BLD HPLC: 6.6 %
HCT VFR BLD CALC: 49.9 %
HDLC SERPL-MCNC: 28 MG/DL
HGB BLD-MCNC: 15.7 G/DL
IMM GRANULOCYTES NFR BLD AUTO: 0.4 %
LDLC SERPL CALC-MCNC: 65 MG/DL
LYMPHOCYTES # BLD AUTO: 2.11 K/UL
LYMPHOCYTES NFR BLD AUTO: 25.1 %
MAN DIFF?: NORMAL
MCHC RBC-ENTMCNC: 30.2 PG
MCHC RBC-ENTMCNC: 31.5 GM/DL
MCV RBC AUTO: 96 FL
MONOCYTES # BLD AUTO: 0.88 K/UL
MONOCYTES NFR BLD AUTO: 10.5 %
NEUTROPHILS # BLD AUTO: 4.97 K/UL
NEUTROPHILS NFR BLD AUTO: 59 %
NONHDLC SERPL-MCNC: 114 MG/DL
PLATELET # BLD AUTO: 321 K/UL
POTASSIUM SERPL-SCNC: 4.7 MMOL/L
PROT SERPL-MCNC: 7 G/DL
RBC # BLD: 5.2 M/UL
RBC # FLD: 14.5 %
SODIUM SERPL-SCNC: 140 MMOL/L
TRIGL SERPL-MCNC: 245 MG/DL
WBC # FLD AUTO: 8.41 K/UL

## 2022-11-11 ENCOUNTER — RX RENEWAL (OUTPATIENT)
Age: 62
End: 2022-11-11

## 2022-11-11 ENCOUNTER — NON-APPOINTMENT (OUTPATIENT)
Age: 62
End: 2022-11-11

## 2023-01-18 ENCOUNTER — RX RENEWAL (OUTPATIENT)
Age: 63
End: 2023-01-18

## 2023-01-25 ENCOUNTER — APPOINTMENT (OUTPATIENT)
Dept: ORTHOPEDIC SURGERY | Facility: CLINIC | Age: 63
End: 2023-01-25
Payer: COMMERCIAL

## 2023-01-25 DIAGNOSIS — M19.011 PRIMARY OSTEOARTHRITIS, RIGHT SHOULDER: ICD-10-CM

## 2023-01-25 DIAGNOSIS — M75.51 BURSITIS OF RIGHT SHOULDER: ICD-10-CM

## 2023-01-25 DIAGNOSIS — M25.511 PAIN IN RIGHT SHOULDER: ICD-10-CM

## 2023-01-25 PROCEDURE — 73030 X-RAY EXAM OF SHOULDER: CPT | Mod: RT

## 2023-01-25 PROCEDURE — 99204 OFFICE O/P NEW MOD 45 MIN: CPT

## 2023-01-25 NOTE — DISCUSSION/SUMMARY
[de-identified] : RUE\par + Neer, + Alvarado\par Pain with resisted abdcution\par Painful arc\par pain and weakness with cuff testing, + Drop arm test\par +TTP LHBT, + Speed \par \par This is a 63 y/o male presenting with right shoulder pain for many months. Patient denies specific injury or trauma to the shoulder.\par X-rays reviewed with the patient demonstrate severe GHOA (bone on bone with deformation of the humeral head)\par Patient has severely restricted ROM and pain.\par Patients current level of pain is intolerable for him.\par Patient is a candidate for shoulder replacement surgery.\par He would like to proceed operatively and schedule surgery as soon as possible.\par Patient will need a CT scan for pre operative planning.\par Patient will need medical and clearance 30 days within surgery date.\par Follow up pre op\par \par Pt has a significant osteoarthritis, with an intact deltoid, rotator cuff insufficiency, and inflammation to the biceps labral complex with proximal biceps tendinopathy. This has been present for more than 6 months and has not improved despite more than 3 months of conservative treatment including focused HEP/therapy, anti-inflammatory medication and activity modification. There have been no injections into the shoulder within 3 months of the indicated procedure. \par The patient is having severe pain with forward flexion and/or pseudoparalysis. \par The patient is interested in pursuing surgical treatment.\par We had a lengthy discussion about the surgery as well as the recovery. We discussed the risks which include but are not limited to infection, bleeding, need for blood transfusions, failure of treatment to alleviate all pain or improve function, the risk of injury to nerves and blood vessels.  There is also the risks inherent to anesthesia as well.  We discussed that given the patient’s distorted anatomy as a result of arthritis, these risks are real although every attempt will be made to mitigate these at the time of surgery.\par Pt understands there is no guarantee of success, however there is a high likelihood of functional improvement and pain relief. The patient understood all this was discussed.\par \par The patient is indicated for a Right reverse shoulder arthroplasty with biceps tenodesis.\par  \par * Surgery: Considering patient has failed all conservative treatment we are requesting authorization for:\par -	Right shoulder Reverse Shoulder Arthroplasty (CPT 10772) with Biceps Tenodesis (CPT 93585)\par Pt would like surgery (End of March / April)\par \par The patient will require a Strunk Arc 2.0 brace, cryotherapy, and 12 weeks of post-operative physical therapy.\par The patient will require a medical clearance and cardiac (stents on Plavix)\par The doctor will require the Biomet representative, 2 hours, and one assistant.\par  \par \par (1) We discussed a comprehensive treatment plans that included a prescription management plan involving the use of prescription strength medications to include Ibuprofen 600- 800 mg TID, versus 500- 650 mg Tylenol. We also discussed prescribing topical diclofenac (Voltaren gel) as well as once daily Meloxicam 15 mg.\par \par (2) The patient has More Than One chronic injuries/illnesses as outlined, discussed, and documented by ICD 10 codes listed, as well as the HPI and Plan section.\par \par There is a moderate risk of morbidity with further treatment, especially from use of prescription strength medications and possible side effects of these medications which include upset stomach and cardiac/renal issues with long term use were discussed.\par \par (3) I recommended that the patient follow-up with their medical physician to discuss any significant specific potential issues with long term use such as interactions with current medications or with exacerbation of underlying medical morbidities.\par \par Attestation:\par \par I, Lucia Queen, attest that this documentation has been prepared under the direction and in the presence of Provider Toby Villalpando MD.\par \par The documentation recorded by the scribe, in my presence, accurately reflects the service I personally performed, and the decisions made by me with my edits as appropriate.\par \par Toby Villalpando MD\par

## 2023-01-25 NOTE — PHYSICAL EXAM
[Right] : right shoulder [Glenohumeral arthritis] : Glenohumeral arthritis [de-identified] : Constitutional: The general appearance of the patient is well developed, well nourished, no deformities and well groomed. Normal\par \par Gait: Gait and function is as follows: normal gait.\par \par Skin: Head and neck visualized skin is normal. Left upper extremity visualized skin is normal. Right upper extremity visualized skin is normal. Thoracic Skin of the thoracic spine shows visualized skin is normal.\par \par Cardiovascular: palpable radial pulse bilaterally, good capillary refill in digits of the bilateral upper extremities and no temperature or color changes in the bilateral upper extremities.\par \par Lymphatic: Normal Palpation of lymph nodes in the cervical.\par \par Neurologic: fine motor control in the bilateral upper extremities is intact. Deep Tendon Reflexes in Upper and Lower Extremities Negative Corrales's in the bilateral upper extremities. The patient is oriented to time, place and person. Sensation to light touch intact in the bilateral upper extremities. Mood and Affect is normal.\par \par Right Shoulder: Inspection of the shoulder/upper arm is as follows: no scapula winging, no biceps deformity and no AC joint deformity. Palpation of the shoulder/upper arm is as follows: There is tenderness at the proximal biceps tendon. Range of motion of the shoulder is as follows: Pain with internal rotation, external rotation, abduction and forward flexion. Strength of the shoulder is as follows: Supraspinatus 3/5. External Rotation 3/5. Internal Rotation 4/5. Deltoid 5/5 Ligament Stability and Special Tests of the shoulder is as follows: Neer test is positive. Alvarado' test is positive. Speed's test is positive.\par \par Left Shoulder: Inspection of the shoulder/upper arm is as follows: There is a full, pain-free, stable range of motion of the shoulder with normal strength and no tenderness to palpation.\par \par Neck:\par \par Inspection / Palpation of the cervical spine is as follows: mild paracervical tenderness. Range of motion of the cervical spine is as follows: moderately decreased range of motion of the cervical spine. Stability testing for the cervical spine is as follows Stable range of motion.\par \par Back, including spine: Inspection / Palpation of the thoracic/lumbar spine is as follows: There is a full, pain free, stable range of motion of the thoracic spine with a normal tone and not tenderness to palpation..\par

## 2023-01-25 NOTE — HISTORY OF PRESENT ILLNESS
[de-identified] : This is a 61 y/o male presenting to the office c/o ongoing right shoulder pain x many months. Patient denies specific injury or trauma to the shoulder. Patient reports he is unable to raise his arm. Pain is described as constant, severe in quality. Currently pain is non-radiating. Patient reports pain has been getting progressively worse. Pain is worse at night. Overall pain does improve with rest and ice. Patient denies any numbness or tingling.\par

## 2023-01-27 NOTE — REASON FOR VISIT
Postpartum Day 2: Vaginal delivery and supracervical hysterectomy d/t uterine atony    Patient is a [id number] that delivered on 2023. The patient feels well. The patient denies emotional concerns. Pain is well controlled with current medications. The baby iswell. Baby is feeding via bottle - Similac with iron. Urinary output is adequate. The patient is ambulating well. The patient is tolerating a normal diet. Flatus has been passed. Objective:      Vitals:    23 0619   BP: (!) 113/57   Pulse: 98   Resp: 18   Temp: 97.4 °F (36.3 °C)   SpO2: 100%         General:    alert, appears stated age, and cooperative   Bowel Sounds:  active   Lochia:  appropriate   heart:   RRR   Incision:  healing well, no significant drainage, no dehiscence, no significant erythema   DVT Evaluation:  No evidence of DVT seen on physical exam.     Lab Results   Component Value Date    WBC 16.6 (H) 2023    HGB 13.0 2023    HCT 37.3 2023    MCV 88.4 2023     (L) 2023     Assessment:     Status post  section. Doing well postoperatively. Plan:     Continue current care. [Follow-Up: _____] : a [unfilled] follow-up visit

## 2023-02-27 ENCOUNTER — RESULT REVIEW (OUTPATIENT)
Age: 63
End: 2023-02-27

## 2023-02-27 ENCOUNTER — OUTPATIENT (OUTPATIENT)
Dept: OUTPATIENT SERVICES | Facility: HOSPITAL | Age: 63
LOS: 1 days | End: 2023-02-27

## 2023-02-27 ENCOUNTER — APPOINTMENT (OUTPATIENT)
Dept: CT IMAGING | Facility: CLINIC | Age: 63
End: 2023-02-27
Payer: COMMERCIAL

## 2023-02-27 DIAGNOSIS — Z98.890 OTHER SPECIFIED POSTPROCEDURAL STATES: Chronic | ICD-10-CM

## 2023-02-27 DIAGNOSIS — Z98.61 CORONARY ANGIOPLASTY STATUS: Chronic | ICD-10-CM

## 2023-02-27 DIAGNOSIS — M25.511 PAIN IN RIGHT SHOULDER: ICD-10-CM

## 2023-02-27 PROCEDURE — 73200 CT UPPER EXTREMITY W/O DYE: CPT | Mod: 26,RT

## 2023-04-21 ENCOUNTER — RX RENEWAL (OUTPATIENT)
Age: 63
End: 2023-04-21

## 2023-04-21 ENCOUNTER — EMERGENCY (EMERGENCY)
Facility: HOSPITAL | Age: 63
LOS: 1 days | Discharge: HOME | End: 2023-04-21
Attending: STUDENT IN AN ORGANIZED HEALTH CARE EDUCATION/TRAINING PROGRAM
Payer: COMMERCIAL

## 2023-04-21 VITALS
HEART RATE: 81 BPM | SYSTOLIC BLOOD PRESSURE: 126 MMHG | RESPIRATION RATE: 20 BRPM | WEIGHT: 188.94 LBS | TEMPERATURE: 98 F | OXYGEN SATURATION: 97 % | HEIGHT: 67 IN | DIASTOLIC BLOOD PRESSURE: 69 MMHG

## 2023-04-21 VITALS
TEMPERATURE: 98 F | SYSTOLIC BLOOD PRESSURE: 125 MMHG | RESPIRATION RATE: 18 BRPM | OXYGEN SATURATION: 98 % | HEART RATE: 80 BPM | DIASTOLIC BLOOD PRESSURE: 62 MMHG

## 2023-04-21 DIAGNOSIS — Z98.61 CORONARY ANGIOPLASTY STATUS: Chronic | ICD-10-CM

## 2023-04-21 DIAGNOSIS — Z98.890 OTHER SPECIFIED POSTPROCEDURAL STATES: Chronic | ICD-10-CM

## 2023-04-21 LAB
ALBUMIN SERPL ELPH-MCNC: 4.4 G/DL — SIGNIFICANT CHANGE UP (ref 3.3–5.2)
ALP SERPL-CCNC: 98 U/L — SIGNIFICANT CHANGE UP (ref 40–120)
ALT FLD-CCNC: 51 U/L — HIGH
ANION GAP SERPL CALC-SCNC: 15 MMOL/L — SIGNIFICANT CHANGE UP (ref 5–17)
APTT BLD: 29 SEC — SIGNIFICANT CHANGE UP (ref 27.5–35.5)
AST SERPL-CCNC: 42 U/L — HIGH
BASOPHILS # BLD AUTO: 0.06 K/UL — SIGNIFICANT CHANGE UP (ref 0–0.2)
BASOPHILS NFR BLD AUTO: 0.6 % — SIGNIFICANT CHANGE UP (ref 0–2)
BILIRUB SERPL-MCNC: 0.9 MG/DL — SIGNIFICANT CHANGE UP (ref 0.4–2)
BUN SERPL-MCNC: 23.4 MG/DL — HIGH (ref 8–20)
CALCIUM SERPL-MCNC: 9.7 MG/DL — SIGNIFICANT CHANGE UP (ref 8.4–10.5)
CHLORIDE SERPL-SCNC: 101 MMOL/L — SIGNIFICANT CHANGE UP (ref 96–108)
CO2 SERPL-SCNC: 25 MMOL/L — SIGNIFICANT CHANGE UP (ref 22–29)
CREAT SERPL-MCNC: 1.25 MG/DL — SIGNIFICANT CHANGE UP (ref 0.5–1.3)
EGFR: 64 ML/MIN/1.73M2 — SIGNIFICANT CHANGE UP
EOSINOPHIL # BLD AUTO: 0.55 K/UL — HIGH (ref 0–0.5)
EOSINOPHIL NFR BLD AUTO: 5.5 % — SIGNIFICANT CHANGE UP (ref 0–6)
GLUCOSE SERPL-MCNC: 120 MG/DL — HIGH (ref 70–99)
HCT VFR BLD CALC: 46 % — SIGNIFICANT CHANGE UP (ref 39–50)
HGB BLD-MCNC: 15.9 G/DL — SIGNIFICANT CHANGE UP (ref 13–17)
IMM GRANULOCYTES NFR BLD AUTO: 0.3 % — SIGNIFICANT CHANGE UP (ref 0–0.9)
INR BLD: 1.14 RATIO — SIGNIFICANT CHANGE UP (ref 0.88–1.16)
LIDOCAIN IGE QN: 48 U/L — SIGNIFICANT CHANGE UP (ref 22–51)
LYMPHOCYTES # BLD AUTO: 2.2 K/UL — SIGNIFICANT CHANGE UP (ref 1–3.3)
LYMPHOCYTES # BLD AUTO: 21.8 % — SIGNIFICANT CHANGE UP (ref 13–44)
MCHC RBC-ENTMCNC: 29.9 PG — SIGNIFICANT CHANGE UP (ref 27–34)
MCHC RBC-ENTMCNC: 34.6 GM/DL — SIGNIFICANT CHANGE UP (ref 32–36)
MCV RBC AUTO: 86.5 FL — SIGNIFICANT CHANGE UP (ref 80–100)
MONOCYTES # BLD AUTO: 1.08 K/UL — HIGH (ref 0–0.9)
MONOCYTES NFR BLD AUTO: 10.7 % — SIGNIFICANT CHANGE UP (ref 2–14)
NEUTROPHILS # BLD AUTO: 6.16 K/UL — SIGNIFICANT CHANGE UP (ref 1.8–7.4)
NEUTROPHILS NFR BLD AUTO: 61.1 % — SIGNIFICANT CHANGE UP (ref 43–77)
PLATELET # BLD AUTO: 287 K/UL — SIGNIFICANT CHANGE UP (ref 150–400)
POTASSIUM SERPL-MCNC: 3.7 MMOL/L — SIGNIFICANT CHANGE UP (ref 3.5–5.3)
POTASSIUM SERPL-SCNC: 3.7 MMOL/L — SIGNIFICANT CHANGE UP (ref 3.5–5.3)
PROT SERPL-MCNC: 7.2 G/DL — SIGNIFICANT CHANGE UP (ref 6.6–8.7)
PROTHROM AB SERPL-ACNC: 13.2 SEC — SIGNIFICANT CHANGE UP (ref 10.5–13.4)
RBC # BLD: 5.32 M/UL — SIGNIFICANT CHANGE UP (ref 4.2–5.8)
RBC # FLD: 13.1 % — SIGNIFICANT CHANGE UP (ref 10.3–14.5)
SODIUM SERPL-SCNC: 141 MMOL/L — SIGNIFICANT CHANGE UP (ref 135–145)
WBC # BLD: 10.08 K/UL — SIGNIFICANT CHANGE UP (ref 3.8–10.5)
WBC # FLD AUTO: 10.08 K/UL — SIGNIFICANT CHANGE UP (ref 3.8–10.5)

## 2023-04-21 PROCEDURE — 96361 HYDRATE IV INFUSION ADD-ON: CPT

## 2023-04-21 PROCEDURE — 36415 COLL VENOUS BLD VENIPUNCTURE: CPT

## 2023-04-21 PROCEDURE — 86900 BLOOD TYPING SEROLOGIC ABO: CPT

## 2023-04-21 PROCEDURE — 74177 CT ABD & PELVIS W/CONTRAST: CPT | Mod: 26,MG

## 2023-04-21 PROCEDURE — 85025 COMPLETE CBC W/AUTO DIFF WBC: CPT

## 2023-04-21 PROCEDURE — 86901 BLOOD TYPING SEROLOGIC RH(D): CPT

## 2023-04-21 PROCEDURE — G1004: CPT

## 2023-04-21 PROCEDURE — 99285 EMERGENCY DEPT VISIT HI MDM: CPT

## 2023-04-21 PROCEDURE — 99284 EMERGENCY DEPT VISIT MOD MDM: CPT | Mod: 25

## 2023-04-21 PROCEDURE — 83690 ASSAY OF LIPASE: CPT

## 2023-04-21 PROCEDURE — 85610 PROTHROMBIN TIME: CPT

## 2023-04-21 PROCEDURE — 96374 THER/PROPH/DIAG INJ IV PUSH: CPT | Mod: XU

## 2023-04-21 PROCEDURE — 74177 CT ABD & PELVIS W/CONTRAST: CPT | Mod: MG

## 2023-04-21 PROCEDURE — 85730 THROMBOPLASTIN TIME PARTIAL: CPT

## 2023-04-21 PROCEDURE — 86850 RBC ANTIBODY SCREEN: CPT

## 2023-04-21 PROCEDURE — 80053 COMPREHEN METABOLIC PANEL: CPT

## 2023-04-21 RX ORDER — MORPHINE SULFATE 50 MG/1
4 CAPSULE, EXTENDED RELEASE ORAL ONCE
Refills: 0 | Status: DISCONTINUED | OUTPATIENT
Start: 2023-04-21 | End: 2023-04-21

## 2023-04-21 RX ORDER — MORPHINE SULFATE 50 MG/1
2 CAPSULE, EXTENDED RELEASE ORAL ONCE
Refills: 0 | Status: DISCONTINUED | OUTPATIENT
Start: 2023-04-21 | End: 2023-04-21

## 2023-04-21 RX ORDER — ONDANSETRON 8 MG/1
4 TABLET, FILM COATED ORAL ONCE
Refills: 0 | Status: DISCONTINUED | OUTPATIENT
Start: 2023-04-21 | End: 2023-04-29

## 2023-04-21 RX ORDER — SODIUM CHLORIDE 9 MG/ML
1000 INJECTION INTRAMUSCULAR; INTRAVENOUS; SUBCUTANEOUS ONCE
Refills: 0 | Status: COMPLETED | OUTPATIENT
Start: 2023-04-21 | End: 2023-04-21

## 2023-04-21 RX ADMIN — Medication 1 TABLET(S): at 18:26

## 2023-04-21 RX ADMIN — SODIUM CHLORIDE 1000 MILLILITER(S): 9 INJECTION INTRAMUSCULAR; INTRAVENOUS; SUBCUTANEOUS at 16:38

## 2023-04-21 RX ADMIN — MORPHINE SULFATE 2 MILLIGRAM(S): 50 CAPSULE, EXTENDED RELEASE ORAL at 16:38

## 2023-04-21 NOTE — ED PROVIDER NOTE - PATIENT PORTAL LINK FT
You can access the FollowMyHealth Patient Portal offered by [facility] by registering at the following website: [email or web address]. By joining [email or web address] FollowMyHealth portal, you will also be able to view your health information using other applications (apps) compatible with our system.

## 2023-04-21 NOTE — ED PROVIDER NOTE - PHYSICAL EXAMINATION
Constitutional - well-developed.   Head - NCAT. Airway patent.   Eyes - PERRL.   CV - RRR. no murmur. no edema.   Pulm - CTAB.   Abd - soft, mild diffuse ttp worse in lower abdomen. no rebound. no guarding.   Neuro - A&Ox3. strength 5/5 x4. sensation intact x4. normal gait.   Skin - No rash. .  MSK - normal ROM.

## 2023-04-21 NOTE — ED ADULT NURSE NOTE - OBJECTIVE STATEMENT
Patient is A&Ox4, in NAD. Presents to ED c/o RLQ pain for weeks that worsened over the last 3 days. Hx of diverticulitis. Patient medicated as per orders. Pending CT scan. ED workup in progress. Will continue to monitor.

## 2023-04-21 NOTE — ED PROVIDER NOTE - OBJECTIVE STATEMENT
Pt is a 61 yo M co lower abd pain. Pt states that for the past two weeks he has had intermittent abd pain. Pt states that for the past three days the pain has been much worse. Pt states that yesterday it was more on the llq and today more rlq. no n/v. no fever/chills. no other complaints.

## 2023-04-21 NOTE — ED PROVIDER NOTE - CARE PROVIDER_API CALL
Cuco Aguilar)  Gastroenterology; Internal Medicine  [address] [place] [address]  Phone: [phone]  Fax: [phone]  Follow Up Time: 1-3 Days

## 2023-04-21 NOTE — ED ADULT NURSE NOTE - SUICIDE SCREENING QUESTION 1
[Yes] : Yes [2 - 3 times a week (3 pts)] : 2 - 3  times a week (3 points) [1 or 2 (0 pts)] : 1 or 2 (0 points) [Never (0 pts)] : Never (0 points) [No] : In the past 12 months have you used drugs other than those required for medical reasons? No [No falls in past year] : Patient reported no falls in the past year [0] : 2) Feeling down, depressed, or hopeless: Not at all (0) [Patient reported colonoscopy was abnormal] : Patient reported colonoscopy was abnormal [Fully functional (using the telephone, shopping, preparing meals, housekeeping, doing laundry, using] : Fully functional and needs no help or supervision to perform IADLs (using the telephone, shopping, preparing meals, housekeeping, doing laundry, using transportation, managing medications and managing finances) [Fully functional (bathing, dressing, toileting, transferring, walking, feeding)] : Fully functional (bathing, dressing, toileting, transferring, walking, feeding) [Reports normal functional visual acuity (ie: able to read med bottle)] : Reports normal functional visual acuity [Patient/Caregiver not ready to engage] : Patient/Caregiver not ready to engage [Good] : ~his/her~ current health as good [Very Good] : ~his/her~  mood as very good [FreeTextEntry1] : physical [] : No [Audit-CScore] : 3 [[id number]] : 0 [EyeExamDate] : 2020 [Reports changes in hearing] : Reports no changes in hearing [Reports changes in vision] : Reports no changes in vision [ColonoscopyDate] : 2019 [ColonoscopyComments] : pre malignant lesions No

## 2023-04-21 NOTE — ED ADULT NURSE NOTE - NSIMPLEMENTINTERV_GEN_ALL_ED
Implemented All Universal Safety Interventions:  [place] to call system. Call bell, personal items and telephone within reach. Instruct patient to call for assistance. Room bathroom lighting operational. Non-slip footwear when patient is off stretcher. Physically safe environment: no spills, clutter or unnecessary equipment. Stretcher in lowest position, wheels locked, appropriate side rails in place.

## 2023-04-21 NOTE — ED PROVIDER NOTE - CLINICAL SUMMARY MEDICAL DECISION MAKING FREE TEXT BOX
labs and imaging reviewed. Pt only with diverticulosis on CT but given the amount of pain patient had and reports that it feels like prior flares will still treat with po abx and refer to gi. instructed to return for worsening pain, fever, vomiting, or any other concerns.  Pt given a copy of all results and instructed to f/up with pcp regarding any abnormal results.

## 2023-06-11 ENCOUNTER — RX RENEWAL (OUTPATIENT)
Age: 63
End: 2023-06-11

## 2023-06-11 RX ORDER — FLUOXETINE HYDROCHLORIDE 40 MG/1
40 CAPSULE ORAL
Qty: 90 | Refills: 3 | Status: ACTIVE | COMMUNITY
Start: 2017-10-06 | End: 1900-01-01

## 2023-06-27 RX ORDER — GABAPENTIN 300 MG/1
300 CAPSULE ORAL
Qty: 90 | Refills: 3 | Status: ACTIVE | COMMUNITY
Start: 2021-09-08 | End: 1900-01-01

## 2023-07-09 ENCOUNTER — RX RENEWAL (OUTPATIENT)
Age: 63
End: 2023-07-09

## 2023-07-09 NOTE — PROGRESS NOTE ADULT - ASSESSMENT
60 y/o male with PSVT, elevated LFTs, CAD s/p PCI in 2/22, HTN, HLD, Insomnia     PSVT:  -Currently in NSR, NO CP, no evidence of ACS  -Cont. BB  -Cont. Tele monitor  -OOB, ambulate  cardiology and EP following, Cath scheduled today, then will have EP study    CAD:  -NPO for LHC in AM per Cardiology consult  -Cont. o/p regimen    Elevated LFTs: OSMEL us done showed Hepatic steatosis, viral hep profile is negative, will monitor, could be due to -Statin?, Fluoxetine?     HTN:  -Cont. o/p regimen  -DASH diet    Insomnia:  -Cont. trazodone/melatonin     Discussed with Patient who agrees with above plan of care. 
DISPLAY PLAN FREE TEXT

## 2023-07-12 ENCOUNTER — APPOINTMENT (OUTPATIENT)
Dept: ORTHOPEDIC SURGERY | Facility: CLINIC | Age: 63
End: 2023-07-12

## 2023-07-16 ENCOUNTER — RX RENEWAL (OUTPATIENT)
Age: 63
End: 2023-07-16

## 2023-08-04 ENCOUNTER — RX CHANGE (OUTPATIENT)
Age: 63
End: 2023-08-04

## 2023-08-04 RX ORDER — TRAZODONE HYDROCHLORIDE 100 MG/1
100 TABLET ORAL
Qty: 60 | Refills: 1 | Status: DISCONTINUED | COMMUNITY
Start: 2022-03-12 | End: 2023-08-04

## 2023-08-10 ENCOUNTER — APPOINTMENT (OUTPATIENT)
Dept: PULMONOLOGY | Facility: CLINIC | Age: 63
End: 2023-08-10
Payer: COMMERCIAL

## 2023-08-10 ENCOUNTER — RX CHANGE (OUTPATIENT)
Age: 63
End: 2023-08-10

## 2023-08-10 VITALS
RESPIRATION RATE: 16 BRPM | BODY MASS INDEX: 28.72 KG/M2 | OXYGEN SATURATION: 97 % | HEART RATE: 69 BPM | SYSTOLIC BLOOD PRESSURE: 131 MMHG | DIASTOLIC BLOOD PRESSURE: 75 MMHG | HEIGHT: 67 IN | WEIGHT: 183 LBS

## 2023-08-10 DIAGNOSIS — Z87.891 PERSONAL HISTORY OF NICOTINE DEPENDENCE: ICD-10-CM

## 2023-08-10 DIAGNOSIS — R06.02 SHORTNESS OF BREATH: ICD-10-CM

## 2023-08-10 DIAGNOSIS — G47.33 OBSTRUCTIVE SLEEP APNEA (ADULT) (PEDIATRIC): ICD-10-CM

## 2023-08-10 DIAGNOSIS — Z86.79 PERSONAL HISTORY OF OTHER DISEASES OF THE CIRCULATORY SYSTEM: ICD-10-CM

## 2023-08-10 DIAGNOSIS — E66.3 OVERWEIGHT: ICD-10-CM

## 2023-08-10 DIAGNOSIS — Z86.16 PERSONAL HISTORY OF COVID-19: ICD-10-CM

## 2023-08-10 DIAGNOSIS — I25.10 ATHEROSCLEROTIC HEART DISEASE OF NATIVE CORONARY ARTERY W/OUT ANGINA PECTORIS: ICD-10-CM

## 2023-08-10 DIAGNOSIS — R93.89 ABNORMAL FINDINGS ON DIAGNOSTIC IMAGING OF OTHER SPECIFIED BODY STRUCTURES: ICD-10-CM

## 2023-08-10 PROCEDURE — 99204 OFFICE O/P NEW MOD 45 MIN: CPT

## 2023-08-10 RX ORDER — MESALAMINE 800 MG/1
800 TABLET, DELAYED RELEASE ORAL 3 TIMES DAILY
Qty: 180 | Refills: 3 | Status: DISCONTINUED | COMMUNITY
Start: 2021-07-07 | End: 2023-08-10

## 2023-08-10 RX ORDER — LEVALBUTEROL TARTRATE 45 UG/1
45 AEROSOL, METERED ORAL
Qty: 3 | Refills: 3 | Status: ACTIVE | COMMUNITY
Start: 1900-01-01 | End: 1900-01-01

## 2023-08-10 RX ORDER — VALSARTAN 40 MG/1
TABLET, COATED ORAL
Refills: 0 | Status: ACTIVE | COMMUNITY

## 2023-08-10 RX ORDER — ALBUTEROL SULFATE 90 UG/1
108 (90 BASE) INHALANT RESPIRATORY (INHALATION)
Qty: 1 | Refills: 2 | Status: DISCONTINUED | COMMUNITY
Start: 2023-08-10 | End: 2023-08-10

## 2023-08-10 RX ORDER — HYDROCHLOROTHIAZIDE 12.5 MG/1
TABLET ORAL
Refills: 0 | Status: ACTIVE | COMMUNITY

## 2023-08-10 NOTE — DISCUSSION/SUMMARY
[FreeTextEntry1] : #1. Home PSG to r/o RYAN #2. Diet and exercise for weight loss #3. His SOBOE may be related to weight and deconditioning as his PFTs are normal; will repeat #4. F/u after PSG; consider autoCPAP if significant RYAN #5. CXR to further evaluate SOB and c/w prior #6. Cardiology f/u  The patient expressed understanding and agreement with the above recommendations/plan and accepts responsibility to be compliant with recommended testing, therapies, and f/u visits. All relevant questions and concerns were addressed.

## 2023-08-10 NOTE — REVIEW OF SYSTEMS
[Cough] : cough [Dyspnea] : dyspnea [Hypertension] : ~T hypertension [As Noted in HPI] : as noted in HPI [Fever] : no fever [Chills] : no chills [Dry Eyes] : no dryness of the eyes [Eye Irritation] : no ~T irritation of the eyes [Epistaxis] : no nosebleeds [Sputum] : not coughing up ~M sputum [Hemoptysis] : no hemoptysis [Chest Tightness] : no chest tightness [Pleuritic Pain] : no pleuritic pain [Wheezing] : no wheezing [Chest Discomfort] : no chest discomfort [Dysrhythmia] : no dysrhythmia [Murmurs] : no murmurs were heard [Palpitations] : no palpitations [Edema] : ~T edema was not present [Hay Fever] : no hay fever [Itchy Eyes] : no itching of ~T the eyes [Reflux] : no reflux [Nausea] : no nausea [Vomiting] : no vomiting [Constipation] : no constipation [Diarrhea] : no diarrhea [Abdominal Pain] : no abdominal pain [Dysuria] : no dysuria [Trauma] : no ~T physical trauma [Fracture] : no fracture [Anemia] : no anemia [Headache] : no headache [Dizziness] : no dizziness [Syncope] : no fainting [Numbness] : no numbness [Paralysis] : no paralysis was seen [Seizures] : no seizures [Diabetes] : no diabetes mellitus [Thyroid Problem] : no thyroid problem [Nasal Congestion] : nasal congestion [Postnasal Drip] : postnasal drip [Sinus Problems] : sinus problems [SOB on Exertion] : sob on exertion [Depression] : depression [Anxiety] : anxiety [Negative] : Endocrine

## 2023-08-10 NOTE — PHYSICAL EXAM
[No Acute Distress] : no acute distress [Low Lying Soft Palate] : low lying soft palate [Elongated Uvula] : elongated uvula [Enlarged Base of the Tongue] : enlarged base of the tongue [III] : Mallampati Class: III [Normal Appearance] : normal appearance [Supple] : supple [Normal Rate/Rhythm] : normal rate/rhythm [Normal S1, S2] : normal s1, s2 [No Murmurs] : no murmurs [No Resp Distress] : no resp distress [No Acc Muscle Use] : no acc muscle use [Normal Rhythm and Effort] : normal rhythm and effort [Clear to Auscultation Bilaterally] : clear to auscultation bilaterally [No Abnormalities] : no abnormalities [No Clubbing] : no clubbing [No Edema] : no edema [No Focal Deficits] : no focal deficits [Oriented x3] : oriented x3 [TextBox_11] : Mod to severe oropharyngeal crowding.  [TextBox_89] : mild tenderness from diverticulitis.

## 2023-08-10 NOTE — END OF VISIT
[>50% of Time Spent on Counseling and Coordination of Care for  ___] : Greater than 50% of the encounter time was spent on counseling and coordination of care for [unfilled] [Time Spent: ___ minutes] : I have spent [unfilled] minutes of time on the encounter. [TextEntry] : Discussed with pt at length regarding ? asthma, soboe, weight issues, RYAN, CAD; reviewed w/u with pt as above

## 2023-08-10 NOTE — REASON FOR VISIT
[Initial] : an initial visit [Abnormal CXR/ Chest CT] : an abnormal CXR/ chest CT [Sleep Apnea] : sleep apnea [Shortness of Breath] : shortness of breath [Obesity] : obesity

## 2023-08-10 NOTE — CONSULT LETTER
[Dear  ___] : Dear  [unfilled], [Consult Letter:] : I had the pleasure of evaluating your patient, [unfilled]. [Please see my note below.] : Please see my note below. [Consult Closing:] : Thank you very much for allowing me to participate in the care of this patient.  If you have any questions, please do not hesitate to contact me. [Sincerely,] : Sincerely, [DrFestus  ___] : Dr. RUIZ [Alek Chapa MD] : Alek Chapa MD [FreeTextEntry3] : Alek Chapa MD, FCCP, D. ABSM Pulmonary and Sleep Medicine [facility] at [place]

## 2023-08-10 NOTE — RESULTS/DATA
[TextEntry] : CXR from 8/30/17 revealed L infiltrate and possible small effusion Chest CT from 8/301/7 revealed bibasilar infiltrates/consolidations LE duplex from 8/30/17 was negative for DVT Echo from 9/5/17 revealed a normal EF with Grade II diastolic dysfunction and trace MR. CXR from 8/31/17 was improved CXR from 11/4/22 was clear - reviewed results and films with patient.

## 2023-08-10 NOTE — HISTORY OF PRESENT ILLNESS
[Initial Evaluation] : an initial evaluation of [Excessive Daytime Sleepiness] : excessive daytime sleepiness [Witnessed Gasping During Sleep] : witnessed gasping during sleep [Snoring] : snoring [Unrefreshing Sleep] : unrefreshing sleep [Sleepy When Sedentary] : sleepy when sedentary [Currently Experiencing] : The patient is currently experiencing symptoms. [None] : The patient is not currently being treated for this problem [TextBox_4] : Former smoker of 2ppd x 10 years, quit 1985 In 2017, the patient was hospitalized for pneumonia and respiratory failure complicated by septic shock and requiring intubation with mechanical ventilation support. He improved with pressor support, IVF hydration and abx. He was successfully extubated but was noted to have significant desaturations with sleep though was not hypoxic while awake. He had O2 at home but no longer requires it.  Pt follows with cardiology for AF s/p ablation and h/o CAD s/p stent placement. Last seen 2019; not compliant with f/u. Patient c/o SOBOE but is otherwise without associated respiratory complaints. Uses occasional Albuterol. [Witnessed Apnea During Sleep] : no witnessed apnea during sleep [ESS] : 2 [TextBox_11] : 8

## 2023-11-17 ENCOUNTER — APPOINTMENT (OUTPATIENT)
Dept: PULMONOLOGY | Facility: CLINIC | Age: 63
End: 2023-11-17

## 2024-01-03 ENCOUNTER — APPOINTMENT (OUTPATIENT)
Dept: INTERNAL MEDICINE | Facility: CLINIC | Age: 64
End: 2024-01-03
Payer: COMMERCIAL

## 2024-01-03 VITALS
WEIGHT: 188.13 LBS | BODY MASS INDEX: 29.53 KG/M2 | DIASTOLIC BLOOD PRESSURE: 82 MMHG | HEIGHT: 67 IN | HEART RATE: 76 BPM | SYSTOLIC BLOOD PRESSURE: 139 MMHG

## 2024-01-03 DIAGNOSIS — R73.03 PREDIABETES.: ICD-10-CM

## 2024-01-03 DIAGNOSIS — K57.80 DIVERTICULITIS OF INTESTINE, PART UNSPECIFIED, WITH PERFORATION AND ABSCESS W/OUT BLEEDING: ICD-10-CM

## 2024-01-03 DIAGNOSIS — Z13.29 ENCOUNTER FOR SCREENING FOR OTHER SUSPECTED ENDOCRINE DISORDER: ICD-10-CM

## 2024-01-03 DIAGNOSIS — R53.83 OTHER FATIGUE: ICD-10-CM

## 2024-01-03 DIAGNOSIS — Z12.5 ENCOUNTER FOR SCREENING FOR MALIGNANT NEOPLASM OF PROSTATE: ICD-10-CM

## 2024-01-03 DIAGNOSIS — G44.52 NEW DAILY PERSISTENT HEADACHE (NDPH): ICD-10-CM

## 2024-01-03 DIAGNOSIS — R06.09 OTHER FORMS OF DYSPNEA: ICD-10-CM

## 2024-01-03 DIAGNOSIS — R29.6 REPEATED FALLS: ICD-10-CM

## 2024-01-03 DIAGNOSIS — I10 ESSENTIAL (PRIMARY) HYPERTENSION: ICD-10-CM

## 2024-01-03 DIAGNOSIS — E78.5 HYPERLIPIDEMIA, UNSPECIFIED: ICD-10-CM

## 2024-01-03 PROCEDURE — 36415 COLL VENOUS BLD VENIPUNCTURE: CPT

## 2024-01-03 PROCEDURE — 99397 PER PM REEVAL EST PAT 65+ YR: CPT | Mod: 25

## 2024-01-03 NOTE — HISTORY OF PRESENT ILLNESS
[FreeTextEntry1] : ANNUAL EXAM [de-identified] : 64 yo MALE WITH HX OF HTN HLD  AND  DIVERTICUL CAD  HERE FOR ANNUAL XAM  HX CARDIAC CATH AND STENT Placement    SEES DR ROBLEDO FROM  CARDIOLOGY PT WITH CHRONIC BACK PAIN ABD PAIN AND SLEEP ISSUES

## 2024-01-03 NOTE — PLAN
[FreeTextEntry1] : 64 yo MALE WITH HX OF HTN HLD  AND  DIVERTICUL CAD  HERE FOR ANNUAL XAM  HX CARDIAC CATH AND STENT Placement    SEES DR ROBLEDO FROM  CARDIOLOGY PT WITH CHRONIC BACK PAIN ABD PAIN AND SLEEP ISSUES  NEEDS TO FOLLOWUP IW GI  APPT NOT UNTIL 3/24 NEEDS SOONER RECOMMEND HE FOLLOWUP WITH DR DUNN FOR SLEEP APNEA HE  NEEDS FORMAL TESTING WILL CEHCK LAB WORK  DEFER EKG AS DONE BY CARDIOLOGY PT RPROTS HE HAS FALLS  WILL DO  CT HEAD R/O CNS ISSUE WILL D/W DAUGHTER WILL FOLLOUWP

## 2024-01-07 LAB
ALBUMIN SERPL ELPH-MCNC: 4.5 G/DL
ALP BLD-CCNC: 79 U/L
ALT SERPL-CCNC: 44 U/L
ANION GAP SERPL CALC-SCNC: 16 MMOL/L
AST SERPL-CCNC: 33 U/L
BASOPHILS # BLD AUTO: 0.06 K/UL
BASOPHILS NFR BLD AUTO: 0.7 %
BILIRUB SERPL-MCNC: 0.5 MG/DL
BUN SERPL-MCNC: 22 MG/DL
CALCIUM SERPL-MCNC: 9.7 MG/DL
CHLORIDE SERPL-SCNC: 104 MMOL/L
CHOLEST SERPL-MCNC: 137 MG/DL
CO2 SERPL-SCNC: 23 MMOL/L
CREAT SERPL-MCNC: 1.16 MG/DL
EGFR: 70 ML/MIN/1.73M2
EOSINOPHIL # BLD AUTO: 0.61 K/UL
EOSINOPHIL NFR BLD AUTO: 7.1 %
ESTIMATED AVERAGE GLUCOSE: 123 MG/DL
GLUCOSE SERPL-MCNC: 163 MG/DL
HBA1C MFR BLD HPLC: 5.9 %
HCT VFR BLD CALC: 49.4 %
HDLC SERPL-MCNC: 30 MG/DL
HGB BLD-MCNC: 16.8 G/DL
IMM GRANULOCYTES NFR BLD AUTO: 0.5 %
LDLC SERPL CALC-MCNC: 38 MG/DL
LYMPHOCYTES # BLD AUTO: 2.64 K/UL
LYMPHOCYTES NFR BLD AUTO: 30.8 %
MAN DIFF?: NORMAL
MCHC RBC-ENTMCNC: 31.6 PG
MCHC RBC-ENTMCNC: 34 GM/DL
MCV RBC AUTO: 92.9 FL
MONOCYTES # BLD AUTO: 0.81 K/UL
MONOCYTES NFR BLD AUTO: 9.5 %
NEUTROPHILS # BLD AUTO: 4.4 K/UL
NEUTROPHILS NFR BLD AUTO: 51.4 %
NONHDLC SERPL-MCNC: 106 MG/DL
PLATELET # BLD AUTO: 264 K/UL
POTASSIUM SERPL-SCNC: 4.2 MMOL/L
PROT SERPL-MCNC: 6.9 G/DL
PSA SERPL-MCNC: 0.2 NG/ML
RBC # BLD: 5.32 M/UL
RBC # FLD: 13.9 %
SODIUM SERPL-SCNC: 143 MMOL/L
T4 SERPL-MCNC: 7.3 UG/DL
TRIGL SERPL-MCNC: 479 MG/DL
TSH SERPL-ACNC: 2.63 UIU/ML
WBC # FLD AUTO: 8.56 K/UL

## 2024-01-09 ENCOUNTER — APPOINTMENT (OUTPATIENT)
Dept: ORTHOPEDIC SURGERY | Facility: HOSPITAL | Age: 64
End: 2024-01-09

## 2024-01-11 ENCOUNTER — NON-APPOINTMENT (OUTPATIENT)
Age: 64
End: 2024-01-11

## 2024-03-06 ENCOUNTER — APPOINTMENT (OUTPATIENT)
Dept: GASTROENTEROLOGY | Facility: CLINIC | Age: 64
End: 2024-03-06
Payer: COMMERCIAL

## 2024-03-06 VITALS
RESPIRATION RATE: 16 BRPM | SYSTOLIC BLOOD PRESSURE: 115 MMHG | BODY MASS INDEX: 29.03 KG/M2 | OXYGEN SATURATION: 98 % | DIASTOLIC BLOOD PRESSURE: 70 MMHG | WEIGHT: 185 LBS | HEART RATE: 70 BPM | HEIGHT: 67 IN

## 2024-03-06 DIAGNOSIS — K57.32 DIVERTICULITIS OF LARGE INTESTINE W/OUT PERFORATION OR ABSCESS W/OUT BLEEDING: ICD-10-CM

## 2024-03-06 DIAGNOSIS — Z12.11 ENCOUNTER FOR SCREENING FOR MALIGNANT NEOPLASM OF COLON: ICD-10-CM

## 2024-03-06 PROCEDURE — G2211 COMPLEX E/M VISIT ADD ON: CPT

## 2024-03-06 PROCEDURE — 99214 OFFICE O/P EST MOD 30 MIN: CPT

## 2024-03-06 RX ORDER — SODIUM SULFATE, MAGNESIUM SULFATE, AND POTASSIUM CHLORIDE 17.75; 2.7; 2.25 G/1; G/1; G/1
1479-225-188 TABLET ORAL
Qty: 1 | Refills: 0 | Status: ACTIVE | COMMUNITY
Start: 2024-03-06 | End: 1900-01-01

## 2024-03-09 NOTE — PHYSICAL EXAM
[Alert] : alert [Normal Voice/Communication] : normal voice/communication [Healthy Appearing] : healthy appearing [Sclera] : the sclera and conjunctiva were normal [Hearing Threshold Finger Rub Not [place]] : hearing was normal [No Acute Distress] : no acute distress [Normal Lips/Gums] : the lips and gums were normal [Oropharynx] : the oropharynx was normal [Normal Appearance] : the appearance of the neck was normal [No Neck Mass] : no neck mass was observed [No Acc Muscle Use] : no accessory muscle use [No Respiratory Distress] : no respiratory distress [Respiration, Rhythm And Depth] : normal respiratory rhythm and effort [Auscultation Breath Sounds / Voice Sounds] : lungs were clear to auscultation bilaterally [Heart Rate And Rhythm] : heart rate was normal and rhythm regular [Normal S1, S2] : normal S1 and S2 [Bowel Sounds] : normal bowel sounds [Abdomen Tenderness] : non-tender [Murmurs] : no murmurs [Abdomen Soft] : soft [No Masses] : no abdominal mass palpated [] : no hepatosplenomegaly [Oriented To Time, Place, And Person] : oriented to person, place, and time

## 2024-03-09 NOTE — HISTORY OF PRESENT ILLNESS
[FreeTextEntry1] : The patient has arrived for the folow up. He has been evaluated for the history of recurrent diverticulitis. He never had any colonoscopy in the past. He is having a shoulder surgery soon. He is being evaluated by cardiology on a regular basis. Patient is doing ok otherwise. Patient is on fiber supplements.

## 2024-03-09 NOTE — ASSESSMENT
[FreeTextEntry1] : Patient is doing well at this time. After his orthopedic surgery, will recommend to consider the colonoscopy. He will v needing cardiology clearance prior to the colonoscopy. The bowel preparation was discussed at length. Risks (including bleeding, pain, perforation, incomplete examination, splenic laceration, adverse reactions to medications, aspiration and death), benefits and alternatives were discussed. Patient is agreeable for the colonoscopy. The patient is medically optimized for the procedure. We will schedule the patient for the procedure. Bowel preparation was sent to the pharmacy.   Cuco Aguilar MD Gastroenterology

## 2024-03-28 NOTE — DISCHARGE NOTE NURSING/CASE MANAGEMENT/SOCIAL WORK - NSCORESITESY/N_GEN_A_CORE_RD
Anesthesia Pre Eval Note    Anesthesia ROS/Med Hx    Overall Review:  EKG was reviewed, Echo was reviewed and Stress test was reviewed     Anesthetic Complication History:    Patient does not have a history of anesthetic complications      Pulmonary Review:    Positive for asthma: exercise induced.    Neuro/Psych Review:       Positive for CVA  Positive for headaches  Positive for psychiatric history - Depression and Anxiety    Cardiovascular Review:     Positive for valvular problems/murmurs - murmur type MR  Positive for hypertension  Positive for hyperlipidemia    GI/HEPATIC/RENAL Review:     Positive for GERD    End/Other Review:    Positive for obesity class II - 35.00 - 39.99  Positive for chronic pain  Additional Results:  EKG:  No results found for this or any previous visit (from the past 4464 hour(s)). Echo:  Ejection Fraction       Date                     Value               Ref Range           Status                01/14/2015               64                                      Final            ----------      Relevant Problems   Endocrine and Metabolic   (+) Severe obesity (BMI 35.0-39.9) with comorbidity (CMD)      Gastrointestinal and Abdominal   (+) GERD (gastroesophageal reflux disease)   (+) Pharyngeal dysphagia      Pulmonary and Pneumonias   (+) Exercise-induced asthma       Physical Exam     Airway   Mallampati: II  TM Distance: >3 FB  TMJ Mobility: Good    Cardiovascular  Cardiovascular exam normal    Head Assessment  Head assessment: Normocephalic and Atraumatic    General Assessment  General Assessment: No acute distress    Dental Exam    Patient has:  Poor Dentition    Pulmonary Exam  Pulmonary exam normal      Anesthesia Plan:    ASA Status: 3  Anesthesia Type: MAC    Induction: Intravenous  Preferred Airway Type: Mask  Patient does not have a difficult airway or is not at risk of aspiration.   Maintenance: TIVA  Premedication: None    Patient does not have an implantable electronic  device requiring post procedure programming.     Post-op Pain Management: Per Surgeon  Postoperative analgesia plan does NOT include opiods    Checklist  Reviewed: Lab Results, EKG, Nursing Notes, Consultations, Patient Summary, Beta Blocker Status, Outside Records, Care Everywhere, Allergies, Medications, Problem list, Past Med History, Anesthesia Record, DNR Status and NPO Status  Consent/Risks Discussed Statement:  The proposed anesthetic plan, including its risks and benefits, have been discussed with the Patient along with the risks and benefits of alternatives. Questions were encouraged and answered and the patient and/or representative understands and agrees to proceed.        I discussed with the patient (and/or patient's legal representative) the risks and benefits of the proposed anesthesia plan, MAC, which may include services performed by other anesthesia providers.    Alternative anesthesia plans, if available, were reviewed with the patient (and/or patient's legal representative). Discussion has been held with the patient (and/or patient's legal representative) regarding risks of anesthesia, which include depressed breathing and emergent situations that may require change in anesthesia plan.    The patient (and/or patient's legal representative) has indicated understanding, his/her questions have been answered, and he/she wishes to proceed with the planned anesthetic.    Informed Consent for Blood: Consented  Blood Products: Not Anticipated     No

## 2024-03-30 ENCOUNTER — RX RENEWAL (OUTPATIENT)
Age: 64
End: 2024-03-30

## 2024-03-30 RX ORDER — TRAZODONE HYDROCHLORIDE 100 MG/1
100 TABLET ORAL
Qty: 180 | Refills: 1 | Status: ACTIVE | COMMUNITY
Start: 1900-01-01 | End: 1900-01-01

## 2024-06-14 ENCOUNTER — APPOINTMENT (OUTPATIENT)
Dept: GASTROENTEROLOGY | Facility: GI CENTER | Age: 64
End: 2024-06-14

## 2024-06-19 ENCOUNTER — APPOINTMENT (OUTPATIENT)
Dept: GASTROENTEROLOGY | Facility: CLINIC | Age: 64
End: 2024-06-19

## 2024-06-26 RX ORDER — ATORVASTATIN CALCIUM 80 MG/1
80 TABLET, FILM COATED ORAL
Qty: 90 | Refills: 0 | Status: ACTIVE | COMMUNITY
Start: 2022-02-11 | End: 1900-01-01

## 2024-07-02 ENCOUNTER — OFFICE (OUTPATIENT)
Dept: URBAN - METROPOLITAN AREA CLINIC 12 | Facility: CLINIC | Age: 64
Setting detail: OPHTHALMOLOGY
End: 2024-07-02
Payer: COMMERCIAL

## 2024-07-02 DIAGNOSIS — H35.033: ICD-10-CM

## 2024-07-02 DIAGNOSIS — H16.223: ICD-10-CM

## 2024-07-02 DIAGNOSIS — H43.811: ICD-10-CM

## 2024-07-02 DIAGNOSIS — H18.413: ICD-10-CM

## 2024-07-02 DIAGNOSIS — H40.013: ICD-10-CM

## 2024-07-02 DIAGNOSIS — H43.392: ICD-10-CM

## 2024-07-02 DIAGNOSIS — H25.13: ICD-10-CM

## 2024-07-02 PROCEDURE — 92014 COMPRE OPH EXAM EST PT 1/>: CPT | Performed by: OPHTHALMOLOGY

## 2024-07-02 PROCEDURE — 92250 FUNDUS PHOTOGRAPHY W/I&R: CPT | Performed by: OPHTHALMOLOGY

## 2024-07-02 ASSESSMENT — CONFRONTATIONAL VISUAL FIELD TEST (CVF)
OD_FINDINGS: FULL
OS_FINDINGS: FULL

## 2024-07-11 ENCOUNTER — APPOINTMENT (OUTPATIENT)
Dept: INTERNAL MEDICINE | Facility: CLINIC | Age: 64
End: 2024-07-11
Payer: COMMERCIAL

## 2024-07-11 VITALS
WEIGHT: 182 LBS | HEIGHT: 67 IN | SYSTOLIC BLOOD PRESSURE: 117 MMHG | DIASTOLIC BLOOD PRESSURE: 70 MMHG | BODY MASS INDEX: 28.56 KG/M2

## 2024-07-11 DIAGNOSIS — K52.9 NONINFECTIVE GASTROENTERITIS AND COLITIS, UNSPECIFIED: ICD-10-CM

## 2024-07-11 DIAGNOSIS — R10.9 UNSPECIFIED ABDOMINAL PAIN: ICD-10-CM

## 2024-07-11 DIAGNOSIS — K21.9 GASTRO-ESOPHAGEAL REFLUX DISEASE W/OUT ESOPHAGITIS: ICD-10-CM

## 2024-07-11 PROCEDURE — 36415 COLL VENOUS BLD VENIPUNCTURE: CPT

## 2024-07-11 PROCEDURE — 99215 OFFICE O/P EST HI 40 MIN: CPT

## 2024-07-11 RX ORDER — PANTOPRAZOLE 40 MG/1
40 TABLET, DELAYED RELEASE ORAL
Qty: 90 | Refills: 3 | Status: ACTIVE | COMMUNITY
Start: 2024-07-11 | End: 1900-01-01

## 2024-07-15 ENCOUNTER — RX RENEWAL (OUTPATIENT)
Age: 64
End: 2024-07-15

## 2024-07-19 LAB
ALP BLD-CCNC: 97 U/L
ALT SERPL-CCNC: 36 U/L
ANION GAP SERPL CALC-SCNC: 12 MMOL/L
AST SERPL-CCNC: 30 U/L
BILIRUB SERPL-MCNC: 0.6 MG/DL
BUN SERPL-MCNC: 25 MG/DL
CALCIUM SERPL-MCNC: 9.9 MG/DL
CHLORIDE SERPL-SCNC: 103 MMOL/L
CO2 SERPL-SCNC: 29 MMOL/L
CREAT SERPL-MCNC: 1.14 MG/DL
EGFR: 71 ML/MIN/1.73M2
GLUCOSE SERPL-MCNC: 96 MG/DL
HCT VFR BLD CALC: 49.5 %
HGB BLD-MCNC: 16.2 G/DL
MCHC RBC-ENTMCNC: 30.3 PG
MCHC RBC-ENTMCNC: 32.7 GM/DL
MCV RBC AUTO: 92.7 FL
PLATELET # BLD AUTO: 317 K/UL
POTASSIUM SERPL-SCNC: 4.3 MMOL/L
PROT SERPL-MCNC: 7.1 G/DL
RBC # BLD: 5.34 M/UL
RBC # FLD: 14.1 %
SODIUM SERPL-SCNC: 143 MMOL/L
WBC # FLD AUTO: 7.97 K/UL

## 2024-07-20 ENCOUNTER — APPOINTMENT (OUTPATIENT)
Dept: CT IMAGING | Facility: CLINIC | Age: 64
End: 2024-07-20

## 2024-07-20 ENCOUNTER — OUTPATIENT (OUTPATIENT)
Dept: OUTPATIENT SERVICES | Facility: HOSPITAL | Age: 64
LOS: 1 days | End: 2024-07-20

## 2024-07-20 DIAGNOSIS — Z98.61 CORONARY ANGIOPLASTY STATUS: Chronic | ICD-10-CM

## 2024-07-20 DIAGNOSIS — Z98.890 OTHER SPECIFIED POSTPROCEDURAL STATES: Chronic | ICD-10-CM

## 2024-07-20 DIAGNOSIS — K57.32 DIVERTICULITIS OF LARGE INTESTINE WITHOUT PERFORATION OR ABSCESS WITHOUT BLEEDING: ICD-10-CM

## 2024-07-25 ENCOUNTER — RX RENEWAL (OUTPATIENT)
Age: 64
End: 2024-07-25

## 2024-07-26 DIAGNOSIS — Z91.041 RADIOGRAPHIC DYE ALLERGY STATUS: ICD-10-CM

## 2024-07-26 RX ORDER — PREDNISONE 50 MG/1
50 TABLET ORAL
Qty: 3 | Refills: 0 | Status: ACTIVE | COMMUNITY
Start: 2024-07-26 | End: 1900-01-01

## 2024-07-29 ENCOUNTER — RESULT REVIEW (OUTPATIENT)
Age: 64
End: 2024-07-29

## 2024-07-30 ENCOUNTER — OUTPATIENT (OUTPATIENT)
Dept: OUTPATIENT SERVICES | Facility: HOSPITAL | Age: 64
LOS: 1 days | End: 2024-07-30
Payer: COMMERCIAL

## 2024-07-30 DIAGNOSIS — Z98.61 CORONARY ANGIOPLASTY STATUS: Chronic | ICD-10-CM

## 2024-07-30 DIAGNOSIS — K57.32 DIVERTICULITIS OF LARGE INTESTINE WITHOUT PERFORATION OR ABSCESS WITHOUT BLEEDING: ICD-10-CM

## 2024-07-30 DIAGNOSIS — Z98.890 OTHER SPECIFIED POSTPROCEDURAL STATES: Chronic | ICD-10-CM

## 2024-07-30 PROCEDURE — 74177 CT ABD & PELVIS W/CONTRAST: CPT | Mod: 26

## 2024-07-30 PROCEDURE — 74177 CT ABD & PELVIS W/CONTRAST: CPT

## 2024-08-27 ENCOUNTER — EMERGENCY (EMERGENCY)
Facility: HOSPITAL | Age: 64
LOS: 1 days | Discharge: HOME | End: 2024-08-27
Attending: STUDENT IN AN ORGANIZED HEALTH CARE EDUCATION/TRAINING PROGRAM
Payer: COMMERCIAL

## 2024-08-27 VITALS
DIASTOLIC BLOOD PRESSURE: 83 MMHG | TEMPERATURE: 98 F | RESPIRATION RATE: 18 BRPM | OXYGEN SATURATION: 97 % | WEIGHT: 194.67 LBS | SYSTOLIC BLOOD PRESSURE: 151 MMHG | HEIGHT: 67 IN | HEART RATE: 61 BPM

## 2024-08-27 VITALS
TEMPERATURE: 98 F | DIASTOLIC BLOOD PRESSURE: 80 MMHG | HEART RATE: 59 BPM | OXYGEN SATURATION: 95 % | RESPIRATION RATE: 12 BRPM | SYSTOLIC BLOOD PRESSURE: 136 MMHG

## 2024-08-27 DIAGNOSIS — Z98.890 OTHER SPECIFIED POSTPROCEDURAL STATES: Chronic | ICD-10-CM

## 2024-08-27 DIAGNOSIS — Z98.61 CORONARY ANGIOPLASTY STATUS: Chronic | ICD-10-CM

## 2024-08-27 LAB
ALBUMIN SERPL ELPH-MCNC: 4.1 G/DL — SIGNIFICANT CHANGE UP (ref 3.3–5.2)
ALP SERPL-CCNC: 95 U/L — SIGNIFICANT CHANGE UP (ref 40–120)
ALT FLD-CCNC: 33 U/L — SIGNIFICANT CHANGE UP
ANION GAP SERPL CALC-SCNC: 12 MMOL/L — SIGNIFICANT CHANGE UP (ref 5–17)
APTT BLD: 31 SEC — SIGNIFICANT CHANGE UP (ref 24.5–35.6)
AST SERPL-CCNC: 28 U/L — SIGNIFICANT CHANGE UP
BASOPHILS # BLD AUTO: 0.04 K/UL — SIGNIFICANT CHANGE UP (ref 0–0.2)
BASOPHILS NFR BLD AUTO: 0.5 % — SIGNIFICANT CHANGE UP (ref 0–2)
BILIRUB SERPL-MCNC: 0.5 MG/DL — SIGNIFICANT CHANGE UP (ref 0.4–2)
BUN SERPL-MCNC: 17.3 MG/DL — SIGNIFICANT CHANGE UP (ref 8–20)
CALCIUM SERPL-MCNC: 9.4 MG/DL — SIGNIFICANT CHANGE UP (ref 8.4–10.5)
CHLORIDE SERPL-SCNC: 105 MMOL/L — SIGNIFICANT CHANGE UP (ref 96–108)
CO2 SERPL-SCNC: 27 MMOL/L — SIGNIFICANT CHANGE UP (ref 22–29)
CREAT SERPL-MCNC: 0.94 MG/DL — SIGNIFICANT CHANGE UP (ref 0.5–1.3)
EGFR: 89 ML/MIN/1.73M2 — SIGNIFICANT CHANGE UP
EOSINOPHIL # BLD AUTO: 0.45 K/UL — SIGNIFICANT CHANGE UP (ref 0–0.5)
EOSINOPHIL NFR BLD AUTO: 5.6 % — SIGNIFICANT CHANGE UP (ref 0–6)
GLUCOSE SERPL-MCNC: 100 MG/DL — HIGH (ref 70–99)
HCT VFR BLD CALC: 46.7 % — SIGNIFICANT CHANGE UP (ref 39–50)
HGB BLD-MCNC: 16 G/DL — SIGNIFICANT CHANGE UP (ref 13–17)
IMM GRANULOCYTES NFR BLD AUTO: 0.4 % — SIGNIFICANT CHANGE UP (ref 0–0.9)
INR BLD: 1.1 RATIO — SIGNIFICANT CHANGE UP (ref 0.85–1.18)
LYMPHOCYTES # BLD AUTO: 2.03 K/UL — SIGNIFICANT CHANGE UP (ref 1–3.3)
LYMPHOCYTES # BLD AUTO: 25.3 % — SIGNIFICANT CHANGE UP (ref 13–44)
MCHC RBC-ENTMCNC: 30.2 PG — SIGNIFICANT CHANGE UP (ref 27–34)
MCHC RBC-ENTMCNC: 34.3 GM/DL — SIGNIFICANT CHANGE UP (ref 32–36)
MCV RBC AUTO: 88.1 FL — SIGNIFICANT CHANGE UP (ref 80–100)
MONOCYTES # BLD AUTO: 0.86 K/UL — SIGNIFICANT CHANGE UP (ref 0–0.9)
MONOCYTES NFR BLD AUTO: 10.7 % — SIGNIFICANT CHANGE UP (ref 2–14)
NEUTROPHILS # BLD AUTO: 4.6 K/UL — SIGNIFICANT CHANGE UP (ref 1.8–7.4)
NEUTROPHILS NFR BLD AUTO: 57.5 % — SIGNIFICANT CHANGE UP (ref 43–77)
NT-PROBNP SERPL-SCNC: <36 PG/ML — SIGNIFICANT CHANGE UP (ref 0–300)
PLATELET # BLD AUTO: 308 K/UL — SIGNIFICANT CHANGE UP (ref 150–400)
POTASSIUM SERPL-MCNC: 4 MMOL/L — SIGNIFICANT CHANGE UP (ref 3.5–5.3)
POTASSIUM SERPL-SCNC: 4 MMOL/L — SIGNIFICANT CHANGE UP (ref 3.5–5.3)
PROT SERPL-MCNC: 6.9 G/DL — SIGNIFICANT CHANGE UP (ref 6.6–8.7)
PROTHROM AB SERPL-ACNC: 12.2 SEC — SIGNIFICANT CHANGE UP (ref 9.5–13)
RBC # BLD: 5.3 M/UL — SIGNIFICANT CHANGE UP (ref 4.2–5.8)
RBC # FLD: 13.4 % — SIGNIFICANT CHANGE UP (ref 10.3–14.5)
SODIUM SERPL-SCNC: 143 MMOL/L — SIGNIFICANT CHANGE UP (ref 135–145)
TROPONIN T, HIGH SENSITIVITY RESULT: 12 NG/L — SIGNIFICANT CHANGE UP (ref 0–51)
TROPONIN T, HIGH SENSITIVITY RESULT: 9 NG/L — SIGNIFICANT CHANGE UP (ref 0–51)
WBC # BLD: 8.01 K/UL — SIGNIFICANT CHANGE UP (ref 3.8–10.5)
WBC # FLD AUTO: 8.01 K/UL — SIGNIFICANT CHANGE UP (ref 3.8–10.5)

## 2024-08-27 PROCEDURE — 84484 ASSAY OF TROPONIN QUANT: CPT

## 2024-08-27 PROCEDURE — 99285 EMERGENCY DEPT VISIT HI MDM: CPT | Mod: 25

## 2024-08-27 PROCEDURE — 93010 ELECTROCARDIOGRAM REPORT: CPT

## 2024-08-27 PROCEDURE — 80053 COMPREHEN METABOLIC PANEL: CPT

## 2024-08-27 PROCEDURE — 85610 PROTHROMBIN TIME: CPT

## 2024-08-27 PROCEDURE — 71045 X-RAY EXAM CHEST 1 VIEW: CPT | Mod: 26

## 2024-08-27 PROCEDURE — 85025 COMPLETE CBC W/AUTO DIFF WBC: CPT

## 2024-08-27 PROCEDURE — 99285 EMERGENCY DEPT VISIT HI MDM: CPT

## 2024-08-27 PROCEDURE — 36415 COLL VENOUS BLD VENIPUNCTURE: CPT

## 2024-08-27 PROCEDURE — 83880 ASSAY OF NATRIURETIC PEPTIDE: CPT

## 2024-08-27 PROCEDURE — 93005 ELECTROCARDIOGRAM TRACING: CPT

## 2024-08-27 PROCEDURE — 70450 CT HEAD/BRAIN W/O DYE: CPT | Mod: 26,MC

## 2024-08-27 PROCEDURE — 70450 CT HEAD/BRAIN W/O DYE: CPT | Mod: MC

## 2024-08-27 PROCEDURE — 85730 THROMBOPLASTIN TIME PARTIAL: CPT

## 2024-08-27 PROCEDURE — 71045 X-RAY EXAM CHEST 1 VIEW: CPT

## 2024-08-27 RX ORDER — MECLIZINE HYDROCHLORIDE 25 MG/1
50 TABLET ORAL ONCE
Refills: 0 | Status: COMPLETED | OUTPATIENT
Start: 2024-08-27 | End: 2024-08-27

## 2024-08-27 RX ORDER — MECLIZINE HYDROCHLORIDE 25 MG/1
1 TABLET ORAL
Qty: 20 | Refills: 0
Start: 2024-08-27

## 2024-08-27 RX ADMIN — MECLIZINE HYDROCHLORIDE 50 MILLIGRAM(S): 25 TABLET ORAL at 19:09

## 2024-08-27 NOTE — ED ADULT TRIAGE NOTE - CHIEF COMPLAINT QUOTE
pt c/o sudden onset of ataxia, weakness, headache lightheadedness at 12pm yesterday. extensive cardiac hx. patient of [facility] Cardiology that was told for patient to come in to the ED.

## 2024-08-27 NOTE — ED PROVIDER NOTE - CLINICAL SUMMARY MEDICAL DECISION MAKING FREE TEXT BOX
65 y/o M with PMH CAD, SVT s/p ablation presents for vertigo that started yesterday, exacerbated by standing and looking right, improved when lying still, also with labile blood pressures. EKG without ischemia, initial orders placed - patient signed out to Dr. Lau pending all results - all further management will be at his discretion.

## 2024-08-27 NOTE — ED PROVIDER NOTE - NSFOLLOWUPINSTRUCTIONS_ED_ALL_ED_FT
Vertigo    Vertigo is the feeling that you or the things around you are moving or spinning when they're not. It's different than feeling dizzy. It can also cause:  Loss of balance.  Trouble standing or walking.  Nausea and vomiting.  This feeling can come and go at any time. It can last from a few seconds to minutes or even hours. It may go away on its own or be treated with medicine.    What are the types of vertigo?  There are two types of vertigo:  Peripheral vertigo happens when parts of your inner ear don't work like they should. This is the more common type.  Central vertigo happens when your brain and spinal cord don't work like they should.  Your health care provider will do tests to find out what kind of vertigo you have. This will help them decide on the right treatment for you.    Follow these instructions at home:  Eating and drinking    Drink enough fluid to keep your pee (urine) pale yellow.  Do not drink alcohol.  Activity    When you get up in the morning, first sit up on the side of the bed. When you feel okay, stand slowly while holding onto something.  Move slowly. Avoid sudden body or head movements.  Avoid certain positions, as told by your provider.  Use a cane if you have trouble standing or walking.  Sit down right away if you feel unsteady.  Place items in your home so they're easy for you to reach without bending or leaning over.  Return to normal activities when you're told. Ask what things are safe for you to do.  General instructions    Take your medicines only as told by your provider.  Contact a health care provider if:  Your medicines don't help or make your vertigo worse.  You get new symptoms.  You have a fever.  You have nausea or vomiting.  Your family or friends spot any changes in how you're acting.  A part of your body goes numb.  You feel tingling and prickling in a part of your body.  You get very bad headaches.  Get help right away if:  You're always dizzy or you faint.  You have a stiff neck.  You have trouble moving or speaking.  Your hands, arms, or legs feel weak.  Your hearing or eyesight changes.  These symptoms may be an emergency. Call 911 right away.  Do not wait to see if the symptoms will go away.  Do not drive yourself to the hospital.  This information is not intended to replace advice given to you by your health care provider. Make sure you discuss any questions you have with your health care provider.

## 2024-08-27 NOTE — ED ADULT NURSE NOTE - CHIEF COMPLAINT QUOTE
pt c/o sudden onset of ataxia, weakness, headache lightheadedness at 12pm yesterday. extensive cardiac hx. patient of [facility] Cardiology that was told for patient to come in to the ED.

## 2024-08-27 NOTE — ED PROVIDER NOTE - NS ED ROS FT
Const: Denies fever, chills  HEENT: Denies blurry vision, sore throat  Neck: Denies neck pain/stiffness  Resp: + SOB. Denies coughing  Cardiovascular: + CP. Denies palpitations, LE edema  GI: Denies nausea, vomiting, abdominal pain, diarrhea, constipation, blood in stool  : Denies urinary frequency/urgency/dysuria, hematuria  MSK: Denies back pain  Neuro: + Dizziness. Denies HA, numbness, weakness  Skin: Denies rashes.

## 2024-08-27 NOTE — ED PROVIDER NOTE - PROGRESS NOTE DETAILS
Sid: Pt received in signout from Dr. Fowler. Pt feeling better, ambulatory with steady gait. No emergent findings on workup. I suspect peripheral vertigo. I offered observation for cardiology consult and MRI; however pt prefers to follow up as outpatient. Medically stable for discharge with return precautions.

## 2024-08-27 NOTE — ED PROVIDER NOTE - CARE PROVIDERS DIRECT ADDRESSES
[email or web address]

## 2024-08-27 NOTE — ED PROVIDER NOTE - PATIENT PORTAL LINK FT
You can access the FollowMyHealth Patient Portal offered by [facility] by registering at the following website: [email or web address]. By joining [employer]’s FollowMyHealth portal, you will also be able to view your health information using other applications (apps) compatible with our system.

## 2024-08-27 NOTE — ED PROVIDER NOTE - CARE PROVIDER_API CALL
Bassem Stockton  Cardiology  [address] [place] [address]  Phone: [phone]  Fax: [phone]  Follow Up Time:     Keo Tinooc  Neurology  [address] [place] [address], [facility] [address]  [place] [address]  Phone: [phone]  Fax: [phone]  Follow Up Time:

## 2024-08-27 NOTE — ED PROVIDER NOTE - OBJECTIVE STATEMENT
63 y/o M with PMH CAD with stents, diverticulitis, HTN, SVT s/p ablation presents for feeling dizziness when standing up or turning to the right, improved when sitting down and staying still. He also notes SOB that is at it's baseline and chest pain that is unchanged. He denies LE edema, abdominal pain, nausea or vomiting. He called his cardiologist (Dr. Stockton) who recommended he come to the ED. He denies headache, has been compliant with his medications, but states that his blood pressure has been as high as 150 systolic followed by bottoming out to the 100's. He notes allergy to IV contrast, but can be pre-medicated.

## 2024-08-27 NOTE — ED PROVIDER NOTE - NSICDXPASTSURGICALHX_GEN_ALL_CORE_FT
PAST SURGICAL HISTORY:  H/O carpal tunnel repair 2010    History of cardiac cath 2/14/2022    History of cardioversion may 2022    History of PTCA 2012

## 2024-08-27 NOTE — ED PROVIDER NOTE - NSICDXPASTMEDICALHX_GEN_ALL_CORE_FT
PAST MEDICAL HISTORY:  Bronchitis, asthmatic     CAD (coronary artery disease) s/p PCI    Chronic back pain     Diverticulitis     Essential hypertension     H/O carpal tunnel repair     High cholesterol     SVT (supraventricular tachycardia)

## 2024-08-27 NOTE — ED ADULT NURSE NOTE - OBJECTIVE STATEMENT
Pt. received A+Ox4, NAD, c/o dizziness and weakness since 12pm yesterday. Pt. states when he goes to stand up he feels like he is going to pass out, states he feels dizziness when moving his neck as well. Pt. states he has no recent sick contacts or changes in meds.

## 2024-09-09 ENCOUNTER — APPOINTMENT (OUTPATIENT)
Dept: INTERNAL MEDICINE | Facility: CLINIC | Age: 64
End: 2024-09-09
Payer: COMMERCIAL

## 2024-09-09 VITALS
BODY MASS INDEX: 28.41 KG/M2 | HEIGHT: 67 IN | SYSTOLIC BLOOD PRESSURE: 148 MMHG | DIASTOLIC BLOOD PRESSURE: 82 MMHG | HEART RATE: 71 BPM | WEIGHT: 181 LBS

## 2024-09-09 DIAGNOSIS — R10.9 UNSPECIFIED ABDOMINAL PAIN: ICD-10-CM

## 2024-09-09 DIAGNOSIS — R79.89 OTHER SPECIFIED ABNORMAL FINDINGS OF BLOOD CHEMISTRY: ICD-10-CM

## 2024-09-09 DIAGNOSIS — R53.83 OTHER FATIGUE: ICD-10-CM

## 2024-09-09 DIAGNOSIS — H81.10 BENIGN PAROXYSMAL VERTIGO, UNSPECIFIED EAR: ICD-10-CM

## 2024-09-09 PROCEDURE — 99215 OFFICE O/P EST HI 40 MIN: CPT

## 2024-09-09 PROCEDURE — 36415 COLL VENOUS BLD VENIPUNCTURE: CPT

## 2024-09-09 NOTE — PLAN
[FreeTextEntry1] : 63 yo MALE WITH HX OF HTN HLD  AND  DIVERTICUL CAD  V HX CARDIAC CATH AND STENT Placement    SEES DR ROBLEDO FROM  CARDIOLOGY HIS MAIN ISSUE IS VERTIGO UNALE TO FUNCITON WELL FEELS OFF [facility]  AND HAD CT SCAN SHOW[facility] ? OLD AREA OF INFARCT  NO  NEW CVA HIS SX SOUND LIKE BPPV  HE HAS TRIED  MECLIIZNE DID NTO WORK  WOULD SUGGEST FORM AL ENT EVAL WILL MAKE APPT EHRE HAS LASB AT ER VISIT WILL CHECK A FEW INCLUDIN TESTOSTREONE  WILL [facility] LABS JUST HAD EKG IN THE ER

## 2024-09-09 NOTE — HISTORY OF PRESENT ILLNESS
[FreeTextEntry1] : DIZZINESS VERTIGO [de-identified] : 65 yo MALE WITH HX OF HTN HLD  AND  DIVERTICUL CAD  V HX CARDIAC CATH AND STENT Placement    SEES DR ROBLEDO FROM  CARDIOLOGY HIS MAIN ISSUE IS VERTIGO UNALE TO FUNCITON WELL FEELS OFF BALANCE [facility]  AND HAD CT SCAN SHOWED ? OLD AREA OF INFARCT  NO  NEW CVA

## 2024-09-09 NOTE — HISTORY OF PRESENT ILLNESS
[FreeTextEntry1] : DIZZINESS VERTIGO [de-identified] : 63 yo MALE WITH HX OF HTN HLD  AND  DIVERTICUL CAD  V HX CARDIAC CATH AND STENT Placement    SEES DR ROBLEDO FROM  CARDIOLOGY HIS MAIN ISSUE IS VERTIGO UNALE TO FUNCITON WELL FEELS OFF BALANCE [facility]  AND HAD CT SCAN SHOWED ? OLD AREA OF INFARCT  NO  NEW CVA

## 2024-09-09 NOTE — PLAN
[FreeTextEntry1] : 65 yo MALE WITH HX OF HTN HLD  AND  DIVERTICUL CAD  V HX CARDIAC CATH AND STENT Placement    SEES DR ROBLEDO FROM  CARDIOLOGY HIS MAIN ISSUE IS VERTIGO UNALE TO FUNCITON WELL FEELS OFF [facility]  AND HAD CT SCAN SHOWED ? OLD AREA OF INFARCT  NO  NEW CVA HIS SX SOUND LIKE BPPV  HE HAS TRIED  MECLIIZNE DID NTO WORK  WOULD SUGGEST FORM AL ENT EVAL WILL MAKE APPT EHRE HAS LASB AT ER VISIT WILL CHECK A FEW INCLUDIN TESTOSTREONE  WILL [facility] LABS JUST HAD EKG IN THE ER

## 2024-09-13 LAB
ERYTHROCYTE [SEDIMENTATION RATE] IN BLOOD BY WESTERGREN METHOD: 8 MM/HR
TESTOST SERPL-MCNC: 361 NG/DL

## 2024-09-23 ENCOUNTER — APPOINTMENT (OUTPATIENT)
Dept: OTOLARYNGOLOGY | Facility: CLINIC | Age: 64
End: 2024-09-23
Payer: COMMERCIAL

## 2024-09-23 VITALS
SYSTOLIC BLOOD PRESSURE: 126 MMHG | BODY MASS INDEX: 28.41 KG/M2 | DIASTOLIC BLOOD PRESSURE: 76 MMHG | HEART RATE: 64 BPM | WEIGHT: 181 LBS | HEIGHT: 67 IN

## 2024-09-23 DIAGNOSIS — J30.9 ALLERGIC RHINITIS, UNSPECIFIED: ICD-10-CM

## 2024-09-23 DIAGNOSIS — I95.1 ORTHOSTATIC HYPOTENSION: ICD-10-CM

## 2024-09-23 DIAGNOSIS — M54.2 CERVICALGIA: ICD-10-CM

## 2024-09-23 DIAGNOSIS — R42 DIZZINESS AND GIDDINESS: ICD-10-CM

## 2024-09-23 PROCEDURE — 99243 OFF/OP CNSLTJ NEW/EST LOW 30: CPT

## 2024-09-23 RX ORDER — FLUTICASONE PROPIONATE 50 UG/1
50 SPRAY, METERED NASAL DAILY
Qty: 1 | Refills: 2 | Status: ACTIVE | COMMUNITY
Start: 2024-09-23 | End: 1900-01-01

## 2024-09-23 NOTE — ASSESSMENT
[FreeTextEntry1] : Dizziness is positional/orthostatic. Low suspicion for inner ear cause.  Check Audiogram. Suggested return to Cardiologist to discuss TTT. F/u spine doctor given spinal stenosis and pain, as possible etiology of dizziness. Rhinitis medicamentosa.  DC Afrin.  Start flonase.  Seek attention for otalgia, otorrhea, bleeding, headache, hearing loss, dizziness or vertigo. Seek attention for epistaxis, nasal discharge, facial pain/pressure, fever, chills, headache.  Followup 2 months

## 2024-09-23 NOTE — REVIEW OF SYSTEMS
[Seasonal Allergies] : seasonal allergies [Ear Itch] : ear itch [Vertigo] : vertigo [Negative] : Heme/Lymph

## 2024-09-23 NOTE — PHYSICAL EXAM
[Nystagmus] : ~T no ~M nystagmus was seen [Fistula Sign] : Fistula Sign: Negative [de-identified] : congestion and dryness nasal mucosa [Midline] : trachea located in midline position [Normal] : orientation to person, place, and time: normal

## 2024-09-23 NOTE — HISTORY OF PRESENT ILLNESS
[de-identified] : 64 year old male here for vertigo. States it all started 3 weeks ago, went to [facility] 8/27/24 CT scan conducted " IMPRESSION: No evidence of acute intracranial pathology." and pt discharged home. No prior audiogram (has appointment for audiogram in 2 months). States hearing loss, and intermittent ear fullness in the right ear. Denies tinnitus, otalgia. Patient describes dizzy spinning. IT was mostly occuring after going from sitting to standing lasting ~5m. Patient feels off balance only with walking or moving  States changes in motion, rolling in bed, getting up quickly bring it on. Discrete episodes last for several minutes, several times a day. Pt states he has not had any episodes of vertigo in the past week.   Denies vestibular tests, MRI, vestibular therapy.   Prior treatments include meclizine - pt states it made him feel loopy.   States history of headaches/migraines.  Denies family history of vertigo, headaches/migraines and motion sickness  Denies currently taking benzodiazepines, SSRIs, headache meds  Also notes 30 years of afrin use for nasal congestion.

## 2024-09-23 NOTE — CONSULT LETTER
[Dear  ___] : Dear  [unfilled], [Courtesy Letter:] : I had the pleasure of seeing your patient, [unfilled], in my office today. [Please see my note below.] : Please see my note below. [Sincerely,] : Sincerely, [FreeTextEntry2] : Dr. Naga Bedoya  [FreeTextEntry3] : Mikie Francis MD Otolaryngology at [address], [address] [place] [address] Phone: [phone] Fax: [phone]

## 2024-09-23 NOTE — DATA REVIEWED
[de-identified] :  ACC: [id number]     EXAM:  CT BRAIN   ORDERED BY: JAMES JIN  PROCEDURE DATE:  08/27/2024    INTERPRETATION:  CLINICAL INFORMATION: dizziness when standing, 65 y/o M with PMH CAD with stents, diverticulitis, HTN, SVT s/p ablation presents for feeling dizziness when standing up or turning to the right, improved when sitting down and staying still. He also notes SOB that is at it's baseline and chest pain that is unchanged. He denies LE edema, abdominal pain, nausea or vomiting. He called his cardiologist (Dr. Stockton) who recommended he come to the ED. He denies headache, has been compliant with his medications, but states that his blood pressure has been as high as 150 systolic followed by bottoming out to the 100's. He notes allergy to IV contrast, but can be pre-medicated.  COMPARISON: Head CT 8/30/2017  CONTRAST: IV Contrast: NONE Complications: None reported at time of study completion  TECHNIQUE:  Serial axial images were obtained from the skull base to the vertex using multi-slice helical technique. Sagittal and coronal reformats were obtained.  FINDINGS:  VENTRICLES AND SULCI: Age appropriate involutional changes. INTRA-AXIAL: No mass effect, acute hemorrhage, or midline shift.  Chronic lacunar infarct right basal ganglia. EXTRA-AXIAL: No mass or fluid collection. Basal cisterns are normal in appearance.  VISUALIZED SINUSES:  Clear. TYMPANOMASTOID CAVITIES:  Clear. VISUALIZED ORBITS: Normal. CALVARIUM: Intact.  MISCELLANEOUS: None.   IMPRESSION: No evidence of acute intracranial pathology.    --- End of Report ---

## 2024-10-10 ENCOUNTER — APPOINTMENT (OUTPATIENT)
Dept: INTERNAL MEDICINE | Facility: CLINIC | Age: 64
End: 2024-10-10

## 2024-10-23 ENCOUNTER — APPOINTMENT (OUTPATIENT)
Dept: GASTROENTEROLOGY | Facility: CLINIC | Age: 64
End: 2024-10-23

## 2025-01-02 ENCOUNTER — OFFICE (OUTPATIENT)
Dept: URBAN - METROPOLITAN AREA CLINIC 12 | Facility: CLINIC | Age: 65
Setting detail: OPHTHALMOLOGY
End: 2025-01-02
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
## 2025-01-02 PROCEDURE — NO SHOW FE NO SHOW FEE: Performed by: OPHTHALMOLOGY

## 2025-04-14 ENCOUNTER — APPOINTMENT (OUTPATIENT)
Dept: ORTHOPEDIC SURGERY | Facility: CLINIC | Age: 65
End: 2025-04-14

## 2025-04-14 DIAGNOSIS — M65.341 TRIGGER FINGER, RIGHT RING FINGER: ICD-10-CM

## 2025-04-14 PROCEDURE — 99203 OFFICE O/P NEW LOW 30 MIN: CPT | Mod: 25

## 2025-04-14 PROCEDURE — 20550 NJX 1 TENDON SHEATH/LIGAMENT: CPT | Mod: RT

## 2025-04-24 ENCOUNTER — RX RENEWAL (OUTPATIENT)
Age: 65
End: 2025-04-24

## 2025-05-28 ENCOUNTER — RESULT CHARGE (OUTPATIENT)
Age: 65
End: 2025-05-28

## 2025-05-28 ENCOUNTER — APPOINTMENT (OUTPATIENT)
Dept: ORTHOPEDIC SURGERY | Facility: CLINIC | Age: 65
End: 2025-05-28

## 2025-05-28 DIAGNOSIS — M19.011 PRIMARY OSTEOARTHRITIS, RIGHT SHOULDER: ICD-10-CM

## 2025-05-28 DIAGNOSIS — M25.511 PAIN IN RIGHT SHOULDER: ICD-10-CM

## 2025-05-28 DIAGNOSIS — M75.51 BURSITIS OF RIGHT SHOULDER: ICD-10-CM

## 2025-05-28 PROCEDURE — 99213 OFFICE O/P EST LOW 20 MIN: CPT | Mod: 25

## 2025-05-28 PROCEDURE — 73030 X-RAY EXAM OF SHOULDER: CPT | Mod: RT

## 2025-05-28 PROCEDURE — 73010 X-RAY EXAM OF SHOULDER BLADE: CPT | Mod: RT

## 2025-05-29 ENCOUNTER — APPOINTMENT (OUTPATIENT)
Dept: INTERNAL MEDICINE | Facility: CLINIC | Age: 65
End: 2025-05-29
Payer: COMMERCIAL

## 2025-05-29 VITALS
WEIGHT: 182 LBS | SYSTOLIC BLOOD PRESSURE: 122 MMHG | DIASTOLIC BLOOD PRESSURE: 72 MMHG | BODY MASS INDEX: 28.56 KG/M2 | HEIGHT: 67 IN

## 2025-05-29 DIAGNOSIS — E78.5 HYPERLIPIDEMIA, UNSPECIFIED: ICD-10-CM

## 2025-05-29 DIAGNOSIS — R73.03 PREDIABETES.: ICD-10-CM

## 2025-05-29 DIAGNOSIS — R29.6 REPEATED FALLS: ICD-10-CM

## 2025-05-29 DIAGNOSIS — R53.83 OTHER FATIGUE: ICD-10-CM

## 2025-05-29 DIAGNOSIS — K57.32 DIVERTICULITIS OF LARGE INTESTINE W/OUT PERFORATION OR ABSCESS W/OUT BLEEDING: ICD-10-CM

## 2025-05-29 DIAGNOSIS — Z23 ENCOUNTER FOR IMMUNIZATION: ICD-10-CM

## 2025-05-29 DIAGNOSIS — R42 DIZZINESS AND GIDDINESS: ICD-10-CM

## 2025-05-29 PROCEDURE — 36415 COLL VENOUS BLD VENIPUNCTURE: CPT

## 2025-05-29 PROCEDURE — G0009: CPT

## 2025-05-29 PROCEDURE — 99205 OFFICE O/P NEW HI 60 MIN: CPT | Mod: 25

## 2025-05-29 PROCEDURE — 90677 PCV20 VACCINE IM: CPT

## 2025-05-29 PROCEDURE — G2211 COMPLEX E/M VISIT ADD ON: CPT | Mod: NC

## 2025-06-01 LAB
ALBUMIN SERPL ELPH-MCNC: 4.6 G/DL
ALP BLD-CCNC: 110 U/L
ALT SERPL-CCNC: 35 U/L
ANION GAP SERPL CALC-SCNC: 13 MMOL/L
AST SERPL-CCNC: 32 U/L
BASOPHILS # BLD AUTO: 0.05 K/UL
BASOPHILS NFR BLD AUTO: 0.6 %
BILIRUB SERPL-MCNC: 0.8 MG/DL
BUN SERPL-MCNC: 26 MG/DL
CALCIUM SERPL-MCNC: 10 MG/DL
CHLORIDE SERPL-SCNC: 104 MMOL/L
CHOLEST SERPL-MCNC: 122 MG/DL
CO2 SERPL-SCNC: 26 MMOL/L
CREAT SERPL-MCNC: 1.14 MG/DL
EGFRCR SERPLBLD CKD-EPI 2021: 71 ML/MIN/1.73M2
EOSINOPHIL # BLD AUTO: 0.41 K/UL
EOSINOPHIL NFR BLD AUTO: 4.8 %
ESTIMATED AVERAGE GLUCOSE: 126 MG/DL
GLUCOSE SERPL-MCNC: 98 MG/DL
HBA1C MFR BLD HPLC: 6 %
HCT VFR BLD CALC: 51 %
HDLC SERPL-MCNC: 33 MG/DL
HGB BLD-MCNC: 16.1 G/DL
IMM GRANULOCYTES NFR BLD AUTO: 0.2 %
LDLC SERPL-MCNC: 56 MG/DL
LYMPHOCYTES # BLD AUTO: 1.86 K/UL
LYMPHOCYTES NFR BLD AUTO: 21.9 %
MAN DIFF?: NORMAL
MCHC RBC-ENTMCNC: 29.7 PG
MCHC RBC-ENTMCNC: 31.6 G/DL
MCV RBC AUTO: 94.1 FL
MONOCYTES # BLD AUTO: 0.96 K/UL
MONOCYTES NFR BLD AUTO: 11.3 %
NEUTROPHILS # BLD AUTO: 5.2 K/UL
NEUTROPHILS NFR BLD AUTO: 61.2 %
NONHDLC SERPL-MCNC: 89 MG/DL
PLATELET # BLD AUTO: 357 K/UL
POTASSIUM SERPL-SCNC: 4.6 MMOL/L
PROT SERPL-MCNC: 7.1 G/DL
RBC # BLD: 5.42 M/UL
RBC # FLD: 14.9 %
SODIUM SERPL-SCNC: 143 MMOL/L
TRIGL SERPL-MCNC: 201 MG/DL
TSH SERPL-ACNC: 2.49 UIU/ML
WBC # FLD AUTO: 8.5 K/UL

## 2025-06-05 NOTE — DISCHARGE NOTE NURSING/CASE MANAGEMENT/SOCIAL WORK - CONTRAINDICATIONS & PRECAUTIONS (SELECT ALL THAT APPLY)
left message with patient second time to let them know that the appointment for Friday 6/6/25 had to be cancelled due to not being authorized. Also per Tania to call claim administration.  
Patient/surrogate refused vaccine...

## 2025-06-11 NOTE — PROGRESS NOTE ADULT - SUBJECTIVE AND OBJECTIVE BOX
Bettina fax us a request for vitamin D 2 50,000 IU cap     Left message to patient at [phone] asking patient to call us back to schedule appointment.    HPI/OVERNIGHT EVENTS:  No acute events overnight. Reports the pain is still there but he is extremely hungry. Denies f/c/n/v/cp/sob. +flatus, -BM    MEDICATIONS  (STANDING):  acetaminophen   Tablet .. 650 milliGRAM(s) Oral every 6 hours  enoxaparin Injectable 40 milliGRAM(s) SubCutaneous daily  FLUoxetine 10 milliGRAM(s) Oral daily  gabapentin 300 milliGRAM(s) Oral two times a day  lactated ringers. 1000 milliLiter(s) (125 mL/Hr) IV Continuous <Continuous>  losartan 25 milliGRAM(s) Oral daily  metoprolol tartrate 25 milliGRAM(s) Oral two times a day  pantoprazole    Tablet 40 milliGRAM(s) Oral before breakfast    MEDICATIONS  (PRN):  ketorolac   Injectable 15 milliGRAM(s) IV Push every 6 hours PRN Moderate Pain (4 - 6)  ondansetron Injectable 4 milliGRAM(s) IV Push every 6 hours PRN Nausea      Vital Signs Last 24 Hrs  T(C): 36.3 (20 Dec 2019 00:30), Max: 37 (19 Dec 2019 17:32)  T(F): 97.3 (20 Dec 2019 00:30), Max: 98.6 (19 Dec 2019 17:32)  HR: 66 (20 Dec 2019 04:42) (60 - 87)  BP: 134/83 (20 Dec 2019 04:42) (96/57 - 159/89)  BP(mean): --  RR: 18 (20 Dec 2019 00:30) (18 - 18)  SpO2: 95% (20 Dec 2019 00:30) (93% - 99%)    gen: nad, a&ox3  cv: rrr  resp :Nonlabored breathing  gi: soft, nd, mild ttp to LLQ  msk: no c/c/e      I&O's Detail    19 Dec 2019 07:01  -  20 Dec 2019 07:00  --------------------------------------------------------  IN:    lactated ringers.: 1250 mL    Oral Fluid: 120 mL  Total IN: 1370 mL    OUT:  Total OUT: 0 mL    Total NET: 1370 mL          LABS:                        16.3   11.08 )-----------( 252      ( 19 Dec 2019 08:49 )             50.5         141  |  101  |  22.0<H>  ----------------------------<  103  5.4<H>   |  28.0  |  0.96    Ca    9.9      19 Dec 2019 08:49    TPro  8.1  /  Alb  4.5  /  TBili  0.8  /  DBili  x   /  AST  38  /  ALT  36  /  AlkPhos  83        Urinalysis Basic - ( 19 Dec 2019 08:08 )    Color: Yellow / Appearance: Clear / S.015 / pH: x  Gluc: x / Ketone: Negative  / Bili: Negative / Urobili: Negative mg/dL   Blood: x / Protein: Negative mg/dL / Nitrite: Negative   Leuk Esterase: Negative / RBC: 0-2 /HPF / WBC 0-2   Sq Epi: x / Non Sq Epi: Occasional / Bacteria: Occasional

## 2025-07-08 ENCOUNTER — RESULT REVIEW (OUTPATIENT)
Age: 65
End: 2025-07-08

## 2025-07-24 ENCOUNTER — RX RENEWAL (OUTPATIENT)
Age: 65
End: 2025-07-24

## 2025-09-15 ENCOUNTER — APPOINTMENT (OUTPATIENT)
Dept: ORTHOPEDIC SURGERY | Facility: CLINIC | Age: 65
End: 2025-09-15